# Patient Record
Sex: FEMALE | Race: WHITE | Employment: FULL TIME | URBAN - METROPOLITAN AREA
[De-identification: names, ages, dates, MRNs, and addresses within clinical notes are randomized per-mention and may not be internally consistent; named-entity substitution may affect disease eponyms.]

---

## 2017-02-16 DIAGNOSIS — E03.9 ACQUIRED HYPOTHYROIDISM: ICD-10-CM

## 2017-02-16 DIAGNOSIS — E78.5 DYSLIPIDEMIA: ICD-10-CM

## 2017-02-17 RX ORDER — LEVOTHYROXINE SODIUM 100 UG/1
TABLET ORAL
Qty: 30 TAB | Refills: 0 | Status: SHIPPED | OUTPATIENT
Start: 2017-02-17 | End: 2017-03-18 | Stop reason: SDUPTHER

## 2017-02-17 RX ORDER — PRAVASTATIN SODIUM 20 MG/1
TABLET ORAL
Qty: 30 TAB | Refills: 0 | Status: SHIPPED | OUTPATIENT
Start: 2017-02-17 | End: 2017-03-18 | Stop reason: SDUPTHER

## 2017-02-21 ENCOUNTER — OFFICE VISIT (OUTPATIENT)
Dept: FAMILY MEDICINE CLINIC | Age: 62
End: 2017-02-21

## 2017-02-21 VITALS
TEMPERATURE: 99.1 F | RESPIRATION RATE: 20 BRPM | SYSTOLIC BLOOD PRESSURE: 122 MMHG | HEIGHT: 67 IN | WEIGHT: 205.2 LBS | BODY MASS INDEX: 32.21 KG/M2 | HEART RATE: 67 BPM | OXYGEN SATURATION: 95 % | DIASTOLIC BLOOD PRESSURE: 80 MMHG

## 2017-02-21 DIAGNOSIS — Z78.9 STATIN INTOLERANCE: ICD-10-CM

## 2017-02-21 DIAGNOSIS — Z96.642 HISTORY OF TOTAL HIP REPLACEMENT, LEFT: ICD-10-CM

## 2017-02-21 DIAGNOSIS — J45.40 MODERATE PERSISTENT ASTHMA WITHOUT COMPLICATION: ICD-10-CM

## 2017-02-21 DIAGNOSIS — H53.8 BLURRY VISION, BILATERAL: ICD-10-CM

## 2017-02-21 DIAGNOSIS — Z11.59 NEED FOR HEPATITIS C SCREENING TEST: ICD-10-CM

## 2017-02-21 DIAGNOSIS — E78.5 DYSLIPIDEMIA: Primary | ICD-10-CM

## 2017-02-21 DIAGNOSIS — R73.9 HYPERGLYCEMIA: ICD-10-CM

## 2017-02-21 DIAGNOSIS — E03.4 HYPOTHYROIDISM DUE TO ACQUIRED ATROPHY OF THYROID: ICD-10-CM

## 2017-02-21 DIAGNOSIS — Z12.39 BREAST CANCER SCREENING: ICD-10-CM

## 2017-02-21 DIAGNOSIS — F32.5 MAJOR DEPRESSION IN COMPLETE REMISSION (HCC): ICD-10-CM

## 2017-02-21 DIAGNOSIS — Z12.11 COLON CANCER SCREENING: ICD-10-CM

## 2017-02-21 DIAGNOSIS — E55.9 VITAMIN D DEFICIENCY: ICD-10-CM

## 2017-02-21 DIAGNOSIS — R63.4 WEIGHT LOSS: ICD-10-CM

## 2017-02-21 DIAGNOSIS — K76.0 FATTY LIVER: ICD-10-CM

## 2017-02-21 DIAGNOSIS — L30.8 OTHER ECZEMA: ICD-10-CM

## 2017-02-21 DIAGNOSIS — E66.9 OBESITY, CLASS I, BMI 30-34.9: ICD-10-CM

## 2017-02-21 DIAGNOSIS — Z20.5 EXPOSURE TO HEPATITIS C: ICD-10-CM

## 2017-02-21 PROBLEM — Z96.649 HISTORY OF TOTAL HIP REPLACEMENT: Status: ACTIVE | Noted: 2017-02-21

## 2017-02-21 RX ORDER — TIOTROPIUM BROMIDE INHALATION SPRAY 1.56 UG/1
2 SPRAY, METERED RESPIRATORY (INHALATION)
COMMUNITY
Start: 2017-02-16 | End: 2018-02-22 | Stop reason: ALTCHOICE

## 2017-02-21 NOTE — PROGRESS NOTES
Chief Complaint   Patient presents with    Medication Refill     1. Have you been to the ER, urgent care clinic since your last visit? Hospitalized since your last visit? No    2. Have you seen or consulted any other health care providers outside of the [address] [place] [address] since your last visit? Include any pap smears or colon screening. Yes, saw Pulmonary Specialist, Dr. Rosalie York, at [facility]    In the event something were to happen to you and you were unable to speak on your behalf, do you have an Advance Directive/ Living Will in place stating your wishes? NO    If yes, do we have a copy on file NO    If no, would you like information:    Patient offered information and declined. All medications need to be refilled per patient. Patient does have dry cough, stated that she was not coughing until she pulled up to office parking lot.  Patient does have low grade fever of 99.1

## 2017-02-21 NOTE — MR AVS SNAPSHOT
Visit Information Date & Time Provider Department Dept. Phone Encounter #  
 2/21/2017  2:45 PM [facility] [phone] [id number] Follow-up Instructions Return in about 6 months (around 8/21/2017) for results, weight, referral follow up . Routing History Upcoming Health Maintenance Date Due Hepatitis C Screening 4/21/2017* BREAST CANCER SCRN MAMMOGRAM 6/22/2017* ZOSTER VACCINE AGE 60> 6/30/2017* COLONOSCOPY 7/21/2017* PAP AKA CERVICAL CYTOLOGY 1/19/2019 DTaP/Tdap/Td series (2 - Td) 10/18/2020 *Topic was postponed. The date shown is not the original due date. Allergies as of 2/21/2017  Review Complete On: 2/21/2017 By: Amanda Moore Severity Noted Reaction Type Reactions Aspirin Medium 02/04/2010   Intolerance Other (comments) Triggers asthma & causes chest tightness but takes 325 mg \"once in a while\" Lipitor [Atorvastatin]  04/11/2008    Myalgia Elevated CPK Sulfa (Sulfonamide Antibiotics)  01/12/2015   Side Effect Rash Erythromycin Low 02/04/2010   Side Effect Nausea and Vomiting Penicillins Low 02/04/2010   Side Effect Nausea and Vomiting Many years ago Current Immunizations  Reviewed on 2/21/2017 Name Date Hep A Vaccine (Adult) 3/5/2014 Hepatitis B Vaccine 4/29/2011, 11/14/2010, 10/14/2010 Influenza Vaccine 10/25/2016 Influenza Vaccine [employer]) 1/15/2016 Influenza Vaccine Split 10/9/2012, 11/22/2011 Influenza Vaccine Whole 10/14/2010 Pneumococcal Vaccine (Unspecified Type) 12/1/1998 TD Vaccine 10/1/1999 TDAP Vaccine 10/18/2010 Reviewed by Tea Lynn DO on 2/21/2017 at  4:24 PM  
You Were Diagnosed With   
  
 Codes Comments Dyslipidemia    -  Primary ICD-10-CM: E78.5 ICD-9-CM: 272.4 Hypothyroidism due to acquired atrophy of thyroid     ICD-10-CM: E03.4 ICD-9-CM: 244.8, 246.8 Vitamin D deficiency     ICD-10-CM: E55.9 ICD-9-CM: 268.9 Moderate persistent asthma without complication     ORW-66-YL: J45.40 ICD-9-CM: 493.90 stable Blurry vision, bilateral     ICD-10-CM: H53.8 ICD-9-CM: 368.8 with distance and reading Weight loss     ICD-10-CM: R63.4 ICD-9-CM: 783.21 7# since 01/2016 due to efforts Major [facility]     ICD-10-CM: F32.5 ICD-9-CM: 296.26 Obesity, Class I, BMI 30-34.9     ICD-10-CM: E66.9 ICD-9-CM: 278.00 Colon cancer screening     ICD-10-CM: Z12.11 ICD-9-CM: V76.51 Need for hepatitis C screening test     ICD-10-CM: Z11.59 
ICD-9-CM: V73.89 Exposure to hepatitis C     ICD-10-CM: Z20.5 ICD-9-CM: V01.79 brother Breast cancer screening     ICD-10-CM: Z12.39 
ICD-9-CM: V76.10 Fatty liver     ICD-10-CM: K76.0 ICD-9-CM: 571.8 History of total hip replacement, left     ICD-10-CM: G72.757 ICD-9-CM: V43.64 Other eczema     ICD-10-CM: L30.8 Statin intolerance     ICD-10-CM: Z78.9 ICD-9-CM: 995.27 Hyperglycemia     ICD-10-CM: R73.9 ICD-9-CM: 790.29 Vitals BP Pulse Temp Resp Height(growth percentile) Weight(growth percentile) 122/80 (BP 1 Location: Left arm, BP Patient Position: Sitting) 67 99.1 °F (37.3 °C) (Oral) 20 5' 7\" (1.702 m) 205 lb 3.2 oz (93.1 kg) SpO2 BMI OB Status Smoking Status 95% 32.14 kg/m2 Postmenopausal Passive Smoke Exposure - Never Smoker Vitals History BMI and BSA Data Body Mass Index Body Surface Area  
 32.14 kg/m 2 2.1 m 2 Preferred Pharmacy Pharmacy Name Phone [phone] In[address] Joshi, [address] [place] [address] [phone] Your Updated Medication List  
  
   
This list is accurate as of: 2/21/17  4:41 PM.  Always use your most recent med list.  
  
  
  
  
 albuterol-ipratropium 2.5 mg-0.5 mg/3 ml Nebu Commonly known as:  DUO-NEB  
3 mL by Nebulization route every six (6) hours. fluticasone-salmeterol 500-50 mcg/dose diskus inhaler Commonly known as:  ADVAIR DISKUS Take 1 Puff by inhalation two (2) times a day. levothyroxine 100 mcg tablet Commonly known as:  SYNTHROID  
TAKE 1 TABLET BY MOUTH DAILY BEFORE BREAKFAST FOR THYROID. methylPREDNISolone 4 mg tablet Commonly known as:  Chio Bon Take  by mouth.  
  
 montelukast 10 mg tablet Commonly known as:  SINGULAIR  
TAKE 1 TABLET DAILY FOR ALLERGIES AND ASTHMA.  
  
 multivitamin tablet Commonly known as:  ONE A DAY Take 1 Tab by mouth daily. Nebulizer & Compressor machine 1 each by Does Not Apply route daily. pravastatin 20 mg tablet Commonly known as:  PRAVACHOL  
TAKE 1 TABLET BY MOUTH EVERY EVENING  
  
 PROAIR HFA 90 mcg/actuation inhaler Generic drug:  albuterol INHALE 1-2 PUFFS EVERY 4-6 HOURS AS NEEDED  
  
 sertraline 50 mg tablet Commonly known as:  ZOLOFT  
TAKE 1 TABLET BY MOUTH DAILY. SPIRIVA RESPIMAT 1.25 mcg/actuation inhaler Generic drug:  tiotropium bromide Take 2 Puffs by mouth nightly. triamcinolone 55 mcg nasal inhaler Commonly known as:  NASACORT AQ Use 2 sprays in each nostril everyday VITAMIN D3 1,000 unit Cap Generic drug:  cholecalciferol Take 1 Tab by mouth daily. We Performed the Following CBC W/O DIFF [07448 CPT(R)] HCV AB W/RFLX TO CATHY [73700 CPT(R)] HEMOGLOBIN A1C WITH EAG [91301 CPT(R)] INSULIN [id number] CPT(R)] LIPID PANEL [53709 CPT(R)] METABOLIC PANEL, COMPREHENSIVE [94412 CPT(R)] REFERRAL TO COLON AND RECTAL SURGERY [REF17 Custom] Comments:  
 Please evaluate patient for colon ca screening REFERRAL TO OPHTHALMOLOGY [REF57 Custom] Comments:  
 Please evaluate patient for blurry vision, floaters T4, FREE [id number] CPT(R)] TSH 3RD GENERATION [11323 CPT(R)] VITAMIN D, 25 HYDROXY [id number] CPT(R)] Follow-up Instructions Return in about 6 months (around 8/21/2017) for results, weight, referral follow up . To-Do List   
 02/21/2017 Imaging:  [facility] MAMMO BI SCREENING INCL CAD Referral Information Referral ID Referred By Referred To  
  
 [id number] [facility]   
   [id number] [place],, [address] Visits Status Start Date End Date 1 New Request 2/21/17 2/21/18 If your referral has a status of pending review or denied, additional information will be sent to support the outcome of this decision. Referral ID Referred By Referred To  
 [id number] Lydia Richmondry [facility] [address] [place], [address] Visits Status Start Date End Date 1 New Request 2/21/17 2/21/18 If your referral has a status of pending review or denied, additional information will be sent to support the outcome of this decision. Introducing [facility]! Dear Inderjit Peñaup: 
Thank you for requesting a [employer] account. Our records indicate that you already have an active [employer] account. You can access your account anytime at [email or web address] Did you know that you can access your hospital and ER discharge instructions at any time in [employer]? You can also review all of your test results from your hospital stay or ER visit. Additional Information If you have questions, please visit the Frequently Asked Questions section of the [employer] website at [email or web address] Remember, [employer] is NOT to be used for urgent needs. For medical emergencies, dial 911. Now available from your iPhone and Android! Please provide this summary of care documentation to your next provider. Your primary care clinician is listed as Juan José Llamas. If you have any questions after today's visit, please call [phone].

## 2017-02-21 NOTE — PROGRESS NOTES
HISTORY OF PRESENT ILLNESS  Delia Chao is a 62 y.o. female presents with Medication Refill; Blood Pressure Check; Labs; and Referral Follow Up    Agree with nurse note. I have not seen Ms. Ya Carlos since 1/15/16. Hyperglycemic pt with hypothyroidism, dyslipidemia, statin intolerance, fatty liver, and Vitamin D deficiency presents to the office with a BP of 146/94 and lowered to 122/80. Labs were last drawn on 1/15/16. LDL was 151. Vitamin D was 31.9. TSH was 1.30. Hgb A1C was 5.7. Pt weighs 205 lbs, lost 9 lbs since last ov. She reports that she had lost weight while avoiding carbs. She had increased her intake of fish and chicken. She gained weight back once her children and grand kids moved in with her around 09/2016. They moved out recently and she plans to return to her stricter diet and regular exercise. Pt is followed by pulmonologist, Dr. Marianne Reyes, whom she last saw on 10/25/16. Dx'd moderate persistent asthma, chronic sinusitis, and abnormal xray. For asthma, he wanted her to continue with Ventolin HFA, Spiriva, and Advair Diskus. For chronic sinusitis, he wanted her to continue with Azelastine qPM, Nasacort qAM, and Singulair 10 mg. Regarding her normal CXR, his impression was granulomatous disease after being expose to [facility]. Lashay's rupture due to living close by. F/U 1 year. Pt with hx of floaters. She reports some vision changes with reading. Her ophthalmologist retired and she requests a referral today. Pt is s/p L hip replacement since 02/2013 performed by Dr. Evens Ascencio. She is still doing well. Pt with major depression in complete remission; stable. Health Maintenance    Pt's most recent colonoscopy was on 4/7/08 with GI, Dr. Casandra Abad. F/U 5 years. She requests a referral.     Pt is agreeable with Hep C screening and reports her brother has Hep C but is doing well. Pt reports seeing dermatologist, Dr. Arely Ibrahim for dry skin.     Pt's last pap smear was on 1/19/16 performed by me; normal.  Pt's most recent mammogram was on 2/8/16; normal. Pt's most recent DEXA scan was on 2/8/16; osteopenia. Written by mateusz Hooper, as dictated by Dr. Verónica Marinelli DO.    ROS    Review of Systems negative except as noted above in HPI. ALLERGIES:    Allergies   Allergen Reactions    Aspirin Other (comments)     Triggers asthma & causes chest tightness but takes 325 mg \"once in a while\"    Lipitor [Atorvastatin] Myalgia     Elevated CPK    Sulfa (Sulfonamide Antibiotics) Rash    Erythromycin Nausea and Vomiting    Penicillins Nausea and Vomiting     Many years ago       CURRENT MEDICATIONS:    Outpatient Prescriptions Marked as Taking for the 2/21/17 encounter (Office Visit) with Sandra Loza DO   Medication Sig Dispense Refill    SPIRIVA RESPIMAT 1.25 mcg/actuation inhaler Take 2 Puffs by mouth nightly.  pravastatin (PRAVACHOL) 20 mg tablet TAKE 1 TABLET BY MOUTH EVERY EVENING 30 Tab 0    levothyroxine (SYNTHROID) 100 mcg tablet TAKE 1 TABLET BY MOUTH DAILY BEFORE BREAKFAST FOR THYROID. 30 Tab 0    sertraline (ZOLOFT) 50 mg tablet TAKE 1 TABLET BY MOUTH DAILY. 90 Tab 1    triamcinolone (NASACORT AQ) 55 mcg nasal inhaler Use 2 sprays in each nostril everyday 1 Bottle 5    montelukast (SINGULAIR) 10 mg tablet TAKE 1 TABLET DAILY FOR ALLERGIES AND ASTHMA. 30 Tab 11    multivitamin (ONE A DAY) tablet Take 1 Tab by mouth daily.  fluticasone-salmeterol (ADVAIR DISKUS) 500-50 mcg/dose diskus inhaler Take 1 Puff by inhalation two (2) times a day. 1 Inhaler 5    albuterol-ipratropium (DUO-NEB) 2.5 mg-0.5 mg/3 ml nebulizer solution 3 mL by Nebulization route every six (6) hours. 30 Vial 1    Nebulizer & Compressor machine 1 each by Does Not Apply route daily.  1 each 0    PROAIR HFA 90 mcg/actuation inhaler INHALE 1-2 PUFFS EVERY 4-6 HOURS AS NEEDED 1 Inhaler 5    Cholecalciferol, Vitamin D3, (VITAMIN D) 1,000 unit Cap Take 1 Tab by mouth daily. PAST MEDICAL HISTORY:    Past Medical History   Diagnosis Date    Adjustment disorder 08/2012     Dr. Chasity Mcleod. Marycarmen Israel, counselor    Allergic rhinitis 2016     Dr. [facility] state, unspecified 01/25/07    Asthma      Dr. Pamela Cobb, pulm    Chickenpox childhood    Cutaneous wart 05/2015     Right thumb. [facility]. Dr. Geronimo Kim.  Depression     Digital mucous cyst 05/2015     Left IP joint. Dr. Magdalena Middleton.  Diverticulosis      Dr. Bo Mendez    DJD (degenerative joint disease) of hip 09/19/12     Dr. Tyrell Vaughn    Eczema 2016     Dr. Sulma Harrison liver 2006    Hearing aid worn 09/2014     Bilateral ears.  Hematuria 95/0230    Hepatitis B immune March 2014     Prior vaccine--[id number].  High cholesterol 1976    Hypothyroid 10/16/2006    Knee pain      having surgery on 2/12/13 left    Left hip pain 2013     Dr. Royce Chavez Measles childhood    Menopause 11/2006    Mumps childhood    Ovarian cyst 02/21/2008     Left and Rt . Mrs. Berman Bronjuliano    Pneumonia 1981     x 2    PONV (postoperative nausea and vomiting)     Scarlet fever 1971    Uterine fibroid 02/21/08     Jonn Byrne, Midwife       PAST SURGICAL HISTORY:    Past Surgical History   Procedure Laterality Date    Hx tonsillectomy  1962    Hx hip replacement Left 02/12/13     Left.  with Anterior approach. Dr. Kendy Boland.  Hx orthopaedic Left 05/18/15     Thumb. IP joint MUCOUS CYST REMOVED. Dr. Magdalena Middleton.  Hx orthopaedic Right 05/18/15     Thumb. Dr. Geronimo Kim.  Hx colonoscopy  04/07/08     Dr. Bo Mendez. due q 5 yrs.        FAMILY HISTORY:    Family History   Problem Relation Age of Onset    Cancer Mother      Squamous Cell Anal CA    High Cholesterol Mother     Osteoporosis Mother     Colon Cancer Father     Hypertension Father     Cancer Maternal Grandmother      cervical    Stroke Maternal Grandmother      TIAs    Other Maternal Grandmother      multiple blood clots    Osteoporosis Maternal Grandmother     Arthritis-osteo Maternal Grandmother     Dementia Maternal Grandmother      Alzheimers    Diabetes Paternal Grandfather        SOCIAL HISTORY:    Social History     Social History    Marital status: SINGLE     Spouse name: N/A    Number of children: N/A    Years of education: N/A     Social History Main Topics    Smoking status: Passive Smoke Exposure - Never Smoker     Years: 1.00    Smokeless tobacco: Never Used      Comment: grew up with smoker parents x 20 yrs    Alcohol use Yes      Comment: one beer every few months    Drug use: No    Sexual activity: No     Other Topics Concern    None     Social History Narrative       IMMUNIZATIONS:    Immunization History   Administered Date(s) Administered    Hep A Vaccine (Adult) 03/05/2014    Hepatitis B Vaccine 10/14/2010, 11/14/2010, 04/29/2011    Influenza Vaccine 10/25/2016    Influenza Vaccine (Quad) 01/15/2016    Influenza Vaccine Split 11/22/2011, 10/09/2012    Influenza Vaccine Whole 10/14/2010    Pneumococcal Vaccine (Unspecified Type) 12/01/1998    TD Vaccine 10/01/1999    TDAP Vaccine 10/18/2010         PHYSICAL EXAMINATION    Vital Signs    Visit Vitals    /80 (BP 1 Location: Left arm, BP Patient Position: Sitting)    Pulse 67    Temp 99.1 °F (37.3 °C) (Oral)    Resp 20    Ht 5' 7\" (1.702 m)    Wt 205 lb 3.2 oz (93.1 kg)    SpO2 95%    BMI 32.14 kg/m2       Weight Metrics 2/21/2017 1/26/2016 1/15/2016 5/18/2015 3/5/2015 2/10/2015 1/27/2015   Weight 205 lb 3.2 oz 212 lb 8.4 oz 214 lb 6.4 oz 211 lb 6 oz 214 lb 12.8 oz 211 lb 211 lb   BMI 32.14 kg/m2 33.28 kg/m2 35.68 kg/m2 35.17 kg/m2 36.85 kg/m2 36.2 kg/m2 36.2 kg/m2       General appearance - Well nourished. Well appearing. Well developed. No acute distress. Overweight. Head - Normocephalic. Atraumatic. Eyes - pupils equal and reactive. Extraocular eye movements intact. Sclera anicteric. Mildly injected sclera. Ears - Hearing is grossly normal bilaterally. Nose - normal and patent. No polyps noted. No erythema. No discharge. Mouth - mucous membranes with adequate moisture. Posterior pharynx normal with cobblestone appearance. No erythema, white exudate or obstruction. Neck - supple. Midline trachea. No carotid bruits noted bilaterally. No thyromegaly noted. Chest - clear to auscultation bilaterally anteriorly and posteriorly. No wheezes. No rales or rhonchi. Breath sounds are symmetrical bilaterally. Unlabored respirations. Heart - normal rate. Regular rhythm. Normal S1, S2. No murmur noted. No rubs, clicks or gallops noted. Abdomen - soft and distended. No masses or organomegaly. No rebound, rigidity or guarding. Bowel sounds normal x 4 quadrants. No tenderness noted. Neurological - awake, alert and oriented to person, place, and time and event. Cranial nerves II through XII intact. Clear speech. Muscle strength is +5/5 x 4 extremities. Sensation is intact to light touch bilaterally. Steady gait. Heme/Lymph - peripheral pulses normal x 4 extremities. No peripheral edema is noted. Musculoskeletal - Intact x 4 extremities. Full ROM x 4 extremities. No pain with movement. Back exam - normal range of motion. No pain on palpation of the spinous processes in the cervical, thoracic, lumbar, sacral regions. No CVA tenderness. Skin - no rashes, erythema, ecchymosis, lacerations, abrasions, suspicious moles noted  Psychological -   normal behavior, dress and thought processes. Good insight. Good eye contact. Normal affect. Appropriate mood. Normal speech.       DATA REVIEWED    Lab Results   Component Value Date/Time    WBC 13.6 01/26/2016 06:16 PM    Hemoglobin (POC) 11.3 02/12/2013 03:40 PM    HGB 15.1 01/26/2016 06:16 PM    HCT 44.4 01/26/2016 06:16 PM    PLATELET 183 04/98/5688 06:16 PM    MCV 83.8 01/26/2016 06:16 PM     Lab Results   Component Value Date/Time    Sodium 140 01/26/2016 06:16 PM    Potassium 4.3 01/26/2016 06:16 PM    Chloride 109 01/26/2016 06:16 PM    CO2 21 01/26/2016 06:16 PM    Anion gap 10 01/26/2016 06:16 PM    Glucose 137 01/26/2016 06:16 PM    BUN 14 01/26/2016 06:16 PM    Creatinine 0.91 01/26/2016 06:16 PM    BUN/Creatinine ratio 15 01/26/2016 06:16 PM    GFR est AA >60 01/26/2016 06:16 PM    GFR est non-AA >60 01/26/2016 06:16 PM    Calcium 9.0 01/26/2016 06:16 PM    Bilirubin, total 0.3 01/26/2016 06:16 PM    AST (SGOT) 30 01/26/2016 06:16 PM    Alk. phosphatase 78 01/26/2016 06:16 PM    Protein, total 8.1 01/26/2016 06:16 PM    Albumin 4.3 01/26/2016 06:16 PM    Globulin 3.8 01/26/2016 06:16 PM    A-G Ratio 1.1 01/26/2016 06:16 PM    ALT (SGPT) 40 01/26/2016 06:16 PM     Lab Results   Component Value Date/Time    Cholesterol, total 235 01/15/2016 10:49 AM    Cholesterol, Total 287 08/16/2013 11:42 AM    HDL Cholesterol 48 01/15/2016 10:49 AM    LDL, calculated 151 01/15/2016 10:49 AM    VLDL, calculated 36 01/15/2016 10:49 AM    Triglyceride 178 01/15/2016 10:49 AM    CHOL/HDL Ratio 7.3 02/04/2010 11:40 AM     Lab Results   Component Value Date/Time    Vitamin D 25-Hydroxy 43.8 06/21/2011 08:00 AM    VITAMIN D, 25-HYDROXY 31.9 01/15/2016 10:49 AM       Lab Results   Component Value Date/Time    Hemoglobin A1c 5.7 01/15/2016 10:49 AM     Lab Results   Component Value Date/Time    TSH 1.310 01/15/2016 10:49 AM       ASSESSMENT and PLAN      ICD-10-CM ICD-9-CM    1. Dyslipidemia E78.5 272.4 LIPID PANEL      METABOLIC PANEL, COMPREHENSIVE      INSULIN   2. Hypothyroidism due to acquired atrophy of thyroid E03.4 244.8 REFERRAL TO OPHTHALMOLOGY     246.8 TSH 3RD GENERATION      T4, FREE   3. Vitamin D deficiency E55.9 268.9 VITAMIN D, 25 HYDROXY   4. Moderate persistent asthma without complication J59.46 519.43 SPIRIVA RESPIMAT 1.25 mcg/actuation inhaler    stable   5.  Blurry vision, bilateral H53.8 368.8 REFERRAL TO OPHTHALMOLOGY      HEMOGLOBIN A1C WITH EAG    with distance and reading   6. Weight loss R63.4 783.21 CBC W/O DIFF    7# since 01/2016 due to efforts   7. Major depression in complete remission (HCC) F32.5 296.26    8. Obesity, Class I, BMI 30-34.9 E66.9 278.00    9. Colon cancer screening Z12.11 V76.51 REFERRAL TO COLON AND RECTAL SURGERY   10. Need for hepatitis C screening test Z11.59 V73.89 HCV AB W/RFLX TO CATHY   11. Exposure to hepatitis C Z20.5 V01.79 HCV AB W/RFLX TO CATHY    brother   15. Breast cancer screening Z12.39 V76.10 [facility] MAMMO BI SCREENING INCL CAD   15. Fatty liver C01.9 724.2 METABOLIC PANEL, COMPREHENSIVE      INSULIN   14. History of total hip replacement, left Z96.642 V43.64    15. Other eczema L30.8     16. Statin intolerance Z78.9 995.27    17. Hyperglycemia R73.9 790.29 HEMOGLOBIN A1C WITH EAG       Discussed the patient's BMI with her. The BMI follow up plan is as follows: I have counseled this patient on diet and exercise regimens. Addressed weight, diet and exercise with patient. Decrease carbohydrates (white foods, sweet foods, sweet drinks and alcohol), increase green leafy vegetables and protein (lean meats and beans) with each meal.  Avoid fried foods. Eat 3-5 small meals daily. Do not skip meals. Increase water intake. Increase physical activity to 30 minutes daily for health benefit or 60 minutes daily to prevent weight regain, as tolerated. Get 7-8 hours uninterrupted sleep nightly. Chart reviewed and updated. Mammogram ordered. Continue current medications and care. Most recent tests reviewed from 01/2016. Recheck pertinent labs when fasting. Recent office visit notes from Dr. Evita Romero reviewed. Get recent office visit notes from Dr. Ronny Barajas. Advised pt to sign release. Referrals given; patient urged to keep appointments with specialists. Gastroenterology. Ophthalmology.    Counseled patient on health concerns:  Hyperglycemia, cholesterol, asthma, and blurry vision. Immunizations noted. Offered empathy, support, legitimation, prayers, partnership to patient. Praised patient for progress. Advance Care Booklet given at office visit. Discussed with pt today. Referred pt to honoring choices. Staff message sent to [facility] choices team.   Follow-up Disposition:  Return in about 6 months (around 8/21/2017) for results, weight, referral follow up . Patient was offered a choice/choices in the treatment plan today. Patient expresses understanding of the plan and agrees with recommendations. Patient declines any additional handouts. Patient is satisfied with previous handouts received from our office    Written by mateusz Gilbert, as dictated by Dr. Joe Pike DO. Documentation True and Accepted by Kathleene Bence.  Terence Eason.

## 2017-02-21 NOTE — ACP (ADVANCE CARE PLANNING)
Re-discussed ACP with patient. Gave her another Right to [facility]. She prefers to discuss it with her . Accepts referral to [facility] team at this time. Patient will bring document to her next office visit.

## 2017-03-17 ENCOUNTER — HOSPITAL ENCOUNTER (OUTPATIENT)
Dept: MAMMOGRAPHY | Age: 62
Discharge: HOME | End: 2017-03-17
Attending: FAMILY MEDICINE
Payer: COMMERCIAL

## 2017-03-17 DIAGNOSIS — Z12.39 BREAST CANCER SCREENING: ICD-10-CM

## 2017-03-17 PROCEDURE — 77067 SCR MAMMO BI INCL CAD: CPT

## 2017-03-25 LAB
25(OH)D3+25(OH)D2 SERPL-MCNC: 45.7 NG/ML (ref 30–100)
ALBUMIN SERPL-MCNC: 4.5 G/DL (ref 3.6–4.8)
ALBUMIN/GLOB SERPL: 2 {RATIO} (ref 1.2–2.2)
ALP SERPL-CCNC: 75 IU/L (ref 39–117)
ALT SERPL-CCNC: 19 IU/L (ref 0–32)
AST SERPL-CCNC: 19 IU/L (ref 0–40)
BILIRUB SERPL-MCNC: 0.5 MG/DL (ref 0–1.2)
BUN SERPL-MCNC: 12 MG/DL (ref 8–27)
BUN/CREAT SERPL: 13 (ref 11–26)
CALCIUM SERPL-MCNC: 9.6 MG/DL (ref 8.7–10.3)
CHLORIDE SERPL-SCNC: 103 MMOL/L (ref 96–106)
CHOLEST SERPL-MCNC: 234 MG/DL (ref 100–199)
CO2 SERPL-SCNC: 23 MMOL/L (ref 18–29)
CREAT SERPL-MCNC: 0.89 MG/DL (ref 0.57–1)
ERYTHROCYTE [DISTWIDTH] IN BLOOD BY AUTOMATED COUNT: 14 % (ref 12.3–15.4)
EST. AVERAGE GLUCOSE BLD GHB EST-MCNC: 120 MG/DL
GLOBULIN SER CALC-MCNC: 2.3 G/DL (ref 1.5–4.5)
GLUCOSE SERPL-MCNC: 89 MG/DL (ref 65–99)
HBA1C MFR BLD: 5.8 % (ref 4.8–5.6)
HCT VFR BLD AUTO: 42.3 % (ref 34–46.6)
HCV AB S/CO SERPL IA: 0.1 S/CO RATIO (ref 0–0.9)
HCV AB SERPL QL IA: NORMAL
HDLC SERPL-MCNC: 58 MG/DL
HGB BLD-MCNC: 14.1 G/DL (ref 11.1–15.9)
INSULIN SERPL-ACNC: 16.9 UIU/ML (ref 2.6–24.9)
INTERPRETATION, 910389: NORMAL
LDLC SERPL CALC-MCNC: 136 MG/DL (ref 0–99)
MCH RBC QN AUTO: 28.8 PG (ref 26.6–33)
MCHC RBC AUTO-ENTMCNC: 33.3 G/DL (ref 31.5–35.7)
MCV RBC AUTO: 86 FL (ref 79–97)
PLATELET # BLD AUTO: 240 X10E3/UL (ref 150–379)
POTASSIUM SERPL-SCNC: 4.4 MMOL/L (ref 3.5–5.2)
PROT SERPL-MCNC: 6.8 G/DL (ref 6–8.5)
RBC # BLD AUTO: 4.9 X10E6/UL (ref 3.77–5.28)
SODIUM SERPL-SCNC: 146 MMOL/L (ref 134–144)
T4 FREE SERPL-MCNC: 1.02 NG/DL (ref 0.82–1.77)
TRIGL SERPL-MCNC: 202 MG/DL (ref 0–149)
TSH SERPL DL<=0.005 MIU/L-ACNC: 6.88 UIU/ML (ref 0.45–4.5)
VLDLC SERPL CALC-MCNC: 40 MG/DL (ref 5–40)
WBC # BLD AUTO: 5.7 X10E3/UL (ref 3.4–10.8)

## 2017-07-07 ENCOUNTER — HOSPITAL ENCOUNTER (OUTPATIENT)
Dept: GENERAL RADIOLOGY | Age: 62
Discharge: HOME | End: 2017-07-07
Payer: COMMERCIAL

## 2017-07-07 DIAGNOSIS — R93.89 ABNORMAL CHEST X-RAY: ICD-10-CM

## 2017-07-07 PROCEDURE — 71020 XR CHEST PA LAT: CPT

## 2017-08-22 ENCOUNTER — OFFICE VISIT (OUTPATIENT)
Dept: FAMILY MEDICINE CLINIC | Age: 62
End: 2017-08-22

## 2017-08-22 VITALS
RESPIRATION RATE: 16 BRPM | BODY MASS INDEX: 32.31 KG/M2 | DIASTOLIC BLOOD PRESSURE: 85 MMHG | HEIGHT: 67 IN | WEIGHT: 205.9 LBS | TEMPERATURE: 98.7 F | HEART RATE: 65 BPM | SYSTOLIC BLOOD PRESSURE: 147 MMHG | OXYGEN SATURATION: 97 %

## 2017-08-22 DIAGNOSIS — E78.5 DYSLIPIDEMIA: Primary | ICD-10-CM

## 2017-08-22 DIAGNOSIS — R73.9 HYPERGLYCEMIA: ICD-10-CM

## 2017-08-22 DIAGNOSIS — E66.9 OBESITY, CLASS I, BMI 30-34.9: ICD-10-CM

## 2017-08-22 DIAGNOSIS — Z80.0 FAMILY HISTORY OF COLON CANCER: ICD-10-CM

## 2017-08-22 DIAGNOSIS — E03.4 HYPOTHYROIDISM DUE TO ACQUIRED ATROPHY OF THYROID: ICD-10-CM

## 2017-08-22 DIAGNOSIS — Z84.89 FAMILY HISTORY OF CARCINOMA IN SITU OF ANAL CANAL: ICD-10-CM

## 2017-08-22 DIAGNOSIS — R63.1 POLYDIPSIA: ICD-10-CM

## 2017-08-22 DIAGNOSIS — J45.20 MILD INTERMITTENT ASTHMA WITHOUT COMPLICATION: ICD-10-CM

## 2017-08-22 DIAGNOSIS — Z98.890 STATUS POST BIOPSY OF SKIN: ICD-10-CM

## 2017-08-22 DIAGNOSIS — E55.9 VITAMIN D DEFICIENCY: ICD-10-CM

## 2017-08-22 DIAGNOSIS — R03.0 ELEVATED BP WITHOUT DIAGNOSIS OF HYPERTENSION: ICD-10-CM

## 2017-08-22 DIAGNOSIS — J30.1 SEASONAL ALLERGIC RHINITIS DUE TO POLLEN: ICD-10-CM

## 2017-08-22 RX ORDER — AZELASTINE HCL 205.5 UG/1
SPRAY NASAL
COMMUNITY
Start: 2017-05-22 | End: 2019-07-02 | Stop reason: ALTCHOICE

## 2017-08-22 RX ORDER — METHYLPREDNISOLONE 4 MG/1
TABLET ORAL
Qty: 1 DOSE PACK | Status: CANCELLED | OUTPATIENT
Start: 2017-08-22

## 2017-08-22 RX ORDER — CHOLECALCIFEROL TAB 125 MCG (5000 UNIT) 125 MCG
TAB ORAL DAILY
COMMUNITY

## 2017-08-22 NOTE — PATIENT INSTRUCTIONS
Learning About Low-Carbohydrate Diets for Weight Loss  What is a low-carbohydrate diet? Low-carb diets avoid foods that are high in carbohydrate. These high-carb foods include pasta, bread, rice, cereal, fruits, and starchy vegetables. Instead, these diets usually have you eat foods that are high in fat and protein. Many people lose weight quickly on a low-carb diet. But the early weight loss is water. People on this diet often gain the weight back after they start eating carbs again. Not all diet plans are safe or work well. A lot of the evidence shows that low-carb diets aren't healthy. That's because these diets often don't include healthy foods like fruits and vegetables. Losing weight safely means balancing protein, fat, and carbs with every meal and snack. And low-carb diets don't always provide the vitamins, minerals, and fiber you need. If you have a serious medical condition, talk to your doctor before you try any diet. These conditions include kidney disease, heart disease, type 2 diabetes, high cholesterol, and high blood pressure. If you are pregnant, it may not be safe for your baby if you are on a low-carb diet. How can you lose weight safely? You might have heard that a diet plan helped another person lose weight. But that doesn't mean that it will work for you. It is very hard to stay on a diet that includes lots of big changes in your eating habits. If you want to get to a healthy weight and stay there, making healthy lifestyle changes will often work better than dieting. These steps can help. · Make a plan for change. Work with your doctor to create a plan that is right for you. · See a dietitian. He or she can show you how to make healthy changes in your eating habits. · Manage stress. If you have a lot of stress in your life, it can be hard to focus on making healthy changes to your daily habits. · Track your food and activity.  You are likely to do better at losing weight if you keep track of what you eat and what you do. Follow-up care is a key part of your treatment and safety. Be sure to make and go to all appointments, and call your doctor if you are having problems. It's also a good idea to know your test results and keep a list of the medicines you take. Where can you learn more? Go to http://bernardo-manju.info/. Enter A121 in the search box to learn more about \"Learning About Low-Carbohydrate Diets for Weight Loss. \"  Current as of: December 8, 2016  Content Version: 11.3  © 5301-0857 [employer]. Care instructions adapted under license by [employer] (which disclaims liability or warranty for this information). If you have questions about a medical condition or this instruction, always ask your healthcare professional. [address] any warranty or liability for your use of this information. Starting a Weight Loss Plan: Care Instructions  Your Care Instructions  If you are thinking about losing weight, it can be hard to know where to start. Your doctor can help you set up a weight loss plan that best meets your needs. You may want to take a class on nutrition or exercise, or join a weight loss support group. If you have questions about how to make changes to your eating or exercise habits, ask your doctor about seeing a registered dietitian or an exercise specialist.  It can be a big challenge to lose weight. But you do not have to make huge changes at once. Make small changes, and stick with them. When those changes become habit, add a few more changes. If you do not think you are ready to make changes right now, try to pick a date in the future. Make an appointment to see your doctor to discuss whether the time is right for you to start a plan. Follow-up care is a key part of your treatment and safety. Be sure to make and go to all appointments, and call your doctor if you are having problems.  Its also a good idea to know your test results and keep a list of the medicines you take. How can you care for yourself at home? · Set realistic goals. Many people expect to lose much more weight than is likely. A weight loss of 5% to 10% of your body weight may be enough to improve your health. · Get family and friends involved to provide support. Talk to them about why you are trying to lose weight, and ask them to help. They can help by participating in exercise and having meals with you, even if they may be eating something different. · Find what works best for you. If you do not have time or do not like to cook, a program that offers meal replacement bars or shakes may be better for you. Or if you like to prepare meals, finding a plan that includes daily menus and recipes may be best.  · Ask your doctor about other health professionals who can help you achieve your weight loss goals. ¨ A dietitian can help you make healthy changes in your diet. ¨ An exercise specialist or  can help you develop a safe and effective exercise program.  ¨ A counselor or psychiatrist can help you cope with issues such as depression, anxiety, or family problems that can make it hard to focus on weight loss. · Consider joining a support group for people who are trying to lose weight. Your doctor can suggest groups in your area. Where can you learn more? Go to http://bernardo-manju.info/. Enter V854 in the search box to learn more about \"Starting a Weight Loss Plan: Care Instructions. \"  Current as of: October 13, 2016  Content Version: 11.3  © 2948-4381 [employer]. Care instructions adapted under license by [employer] (which disclaims liability or warranty for this information).  If you have questions about a medical condition or this instruction, always ask your healthcare professional. [place] [address] any warranty or liability for your use of this information. Starting a Weight Loss Plan: Care Instructions  Your Care Instructions  If you are thinking about losing weight, it can be hard to know where to start. Your doctor can help you set up a weight loss plan that best meets your needs. You may want to take a class on nutrition or exercise, or join a weight loss support group. If you have questions about how to make changes to your eating or exercise habits, ask your doctor about seeing a registered dietitian or an exercise specialist.  It can be a big challenge to lose weight. But you do not have to make huge changes at once. Make small changes, and stick with them. When those changes become habit, add a few more changes. If you do not think you are ready to make changes right now, try to pick a date in the future. Make an appointment to see your doctor to discuss whether the time is right for you to start a plan. Follow-up care is a key part of your treatment and safety. Be sure to make and go to all appointments, and call your doctor if you are having problems. Its also a good idea to know your test results and keep a list of the medicines you take. How can you care for yourself at home? · Set realistic goals. Many people expect to lose much more weight than is likely. A weight loss of 5% to 10% of your body weight may be enough to improve your health. · Get family and friends involved to provide support. Talk to them about why you are trying to lose weight, and ask them to help. They can help by participating in exercise and having meals with you, even if they may be eating something different. · Find what works best for you. If you do not have time or do not like to cook, a program that offers meal replacement bars or shakes may be better for you.  Or if you like to prepare meals, finding a plan that includes daily menus and recipes may be best.  · Ask your doctor about other health professionals who can help you achieve your weight loss goals.  ¨ A dietitian can help you make healthy changes in your diet. ¨ An exercise specialist or  can help you develop a safe and effective exercise program.  ¨ A counselor or psychiatrist can help you cope with issues such as depression, anxiety, or family problems that can make it hard to focus on weight loss. · Consider joining a support group for people who are trying to lose weight. Your doctor can suggest groups in your area. Where can you learn more? Go to http://bernardo-manju.info/. Enter Y466 in the search box to learn more about \"Starting a Weight Loss Plan: Care Instructions. \"  Current as of: October 13, 2016  Content Version: 11.3  © 5934-1225 [employer]. Care instructions adapted under license by [employer] (which disclaims liability or warranty for this information). If you have questions about a medical condition or this instruction, always ask your healthcare professional. [place] [address] any warranty or liability for your use of this information. WEIGHT LOSS PLAN    1. Read food labels and count calories. Goal 3259-0271 calories daily for women and 1556-3292 calories daily for men. Achieve this by decreasing caloric intake by 500 calories by weekly increments. NOTE:  1 pound = 3500 calories! Www.loseit. com  Www.mySkyroboticnesspal.com  Www.[employer]ova  Www.healthfinder. gov  Weight watchers mobile    Calories needed to lose 1-2 pounds a week = 10 x your weight in pounds    2. Increase water intake. Per [facility], it is important to drink 1/2 your body weight in ounces of water daily. Decrease your water consumption 2-3 hours before bedtime to prevent sleep disturbance from frequent urination. 3.  Decrease sugary beverages. Each can or glass of soda increases your risk of obesity by 60%. Can lose 10 pounds in a month by avoiding any soda.      12 oz can of soda = 140 calories, 16 oz cup of sweet tea = 200 calories    16 oz orange juice = 200 calories, 10 oz apple juice = 150 calories   32 oz sports drink = 200 calories, 16 oz punch = 240 calories   3.5 oz alcohol = 100-150 calories     4. Avoid High Fructose Corn Syrup products. This ingredient makes products highly addictive! 5. Exercise 30 minutes daily 5 days weekly, minimally. If you burn 3500 calories that equals a pound! Use a pedometer to count steps. Visit www. [employer]any for a free pedometer and diet recommendations. Your maximum heart rate = 220 - your age    Never exercise at your maximum heart rate. See handout for target heart rate. 6.  Decrease carbohydrates (white bread, pasta, rice, potatoes, sweet foods and sweet drinks like soda, tea, coffee, juice and sports drinks). Increase fiber and protein. Goal:   calories daily of carbohydrates     Try CHROMIUM PICONATE 200 MG THREE TIMES DAILY,    to decrease Premenstrual carbohydrate cravings. 7.  Eat 3-5 small meals daily, include lots of protein (beans/legumes, nuts, lean meat, eggs) and green vegetables with each. (Breakfast, lunch, dinner with 2 healthy snacks)    8. Get proper rest 7-8 hours uninterrupted. When you get less than 6 hours, it triggers hunger by affecting your [place]:Leptin ratio and this results in weight gain. 9.  Watch your food portions. Green leafy vegetables should cover 1/2 of the plate, lean meat 1/4 of the plate, starchy vegetable 1/4 of the plate. Use smaller plates. 10.  Do not eat until you are full. Eat until you are no longer hungry. If you are not sure, try drinking a glass of water before getting your second serving of food. 11.  Do not weigh yourself daily. Wait until your next office visit. Use how you feel and  how your clothes fit as measurements of success. 12.  Address your spirituality to draw strength from above during your journey. Remember \"I am fearfully and wonderfully made. Neal in His eyes. \"    13. Set realistic, appropriate and achievable weight loss goals:     RECOMMENDED TARGET WEIGHT LOSS:  Initial weight loss of 5-10% of your initial body weight achieved over 6 months or a decrease of 2 BMI units. MINIMUM GOAL OF WEIGHT LOSS:  Reduce body weight and maintain a lower body weight. Prevent weight gain. RELATED WEBSITES:      Www.obesityaction. org  (and consider joining the Obesity Action Coalition for $25/year. The [address] mission is to elevated and empower those affected by obesity through education, advocacy and support. Quarterly journals included in membership fee. )    Www.Kiddies Smilz  www.CrowdFlower.com     RELATED DIETS:  Dr. Wei Still Weight loss challenge, Mediterranean diet, [address] [place] [address] Watchers, Paleo Diet (anti-inflammatory diet)        EXERCISE 150 MINUTES WEEKLY. THIS CAN BE ACHIEVED BY WORKING OUT OR WALKING A MINIMUM OF 30 MINUTES FOR 5 DAYS WEEKLY. YOU CAN EXERCISE IN INCREMENTS OF 10-15 MINUTES UP TO 3 TIMES A DAY. Consider performing \"brainless exercise. \"  Choose your favorite tv program.  Five minutes before and for 5 minutes after the tv program, stretch your body. While the program is on, walk in place watching the show. When commercials come on, rest or walk around the house to do other things. When the program begins, return to walking in place. When you are able keep walking during the commercials and add light weights to your ankles or hands. By the end of the show, you would have walked 30 minutes. If you need shorter spurts of exercise, walk during the commercials and rest during the show. Drink to glasses of water prior to any exercise to prevent dehydration and to improve the results of the work out. Your RESTING HEART RATE is the number of times your heart beats per minute when you are not exerting yourself. The more fit you are, the lower your resting heart rate will be.     Your MAXIMUM HEART RATE is the number of times per minute your heart pumps when it is working at 100% capacity. NEVER EXERCISE AT YOUR MAXIMUM HEART RATE. MAX HEART RATE = 220 - your age    For example, in a 48year old, the maximum heart rate is 220-50 = 170 beats per minute    Your [place] is the number of beats per minute our heart should pump during aerobic exercise. Reaching your target heart rate indicates that your body is receiving maximum cardiovascular and fat burning benefits. If you are fit, your TARGET HEART RATE = 70-85% of your maximum Heart rate      For a 48year old with vigorous intensity physical activity,         Target heart rate= 170 bpm x .70 = 119 bpm     Target heart rate = 170 bpm x .85=  145 bpm        Therefore, your target heart rate during physical activity is 119-145      If you are not fit, TARGET HEART RATE = 50-70% of your maximum Heart rate    MODERATE CONSISTENT AEROBIC EXERCISE OR WALKING FOR AT LEAST 150 MINUTES WEEKLY IS AN ESSENTIAL PART OF ANY WEIGHT LOSS OF WEIGHT MAINTENANCE PROGRAM.     During WEIGHT LOSS PHASE, at least 75% of your exercise needs to be walking or moderate aerobic activity and 25% or 0% can be Isometric or Resistance type exercises (the latter can be deferred to the weight maintenance phase.)     During WEIGHT MAINTENANCE PHASE, at least 50% of your exercise needs to be aerobic and 50% Isometric or Resistance type exercises. BENEFITS OF MODERATE INTENSITY EXERCISE MOST DAYS OF THE WEEK:     1. Insulin Resistance improves 30-85%   2. Abdominal Fat decreases by 30%   3. Inflammatory Markers decrease by 30% (therefore pain decreases)   4. Systolic and Diastolic Blood Pressure decrease by 4 mmHg   5. HDL improves by 5%   6. Triglycerides decrease by 15%   7. Shift from small dense LDL to large dense LDL (therefore decrease Insulin Resistance)   8.   Decrease coagulability                  Starting a Weight Loss Plan: Care Instructions  Your Care Instructions  If you are thinking about losing weight, it can be hard to know where to start. Your doctor can help you set up a weight loss plan that best meets your needs. You may want to take a class on nutrition or exercise, or join a weight loss support group. If you have questions about how to make changes to your eating or exercise habits, ask your doctor about seeing a registered dietitian or an exercise specialist.  It can be a big challenge to lose weight. But you do not have to make huge changes at once. Make small changes, and stick with them. When those changes become habit, add a few more changes. If you do not think you are ready to make changes right now, try to pick a date in the future. Make an appointment to see your doctor to discuss whether the time is right for you to start a plan. Follow-up care is a key part of your treatment and safety. Be sure to make and go to all appointments, and call your doctor if you are having problems. Its also a good idea to know your test results and keep a list of the medicines you take. How can you care for yourself at home? · Set realistic goals. Many people expect to lose much more weight than is likely. A weight loss of 5% to 10% of your body weight may be enough to improve your health. · Get family and friends involved to provide support. Talk to them about why you are trying to lose weight, and ask them to help. They can help by participating in exercise and having meals with you, even if they may be eating something different. · Find what works best for you. If you do not have time or do not like to cook, a program that offers meal replacement bars or shakes may be better for you. Or if you like to prepare meals, finding a plan that includes daily menus and recipes may be best.  · Ask your doctor about other health professionals who can help you achieve your weight loss goals.   ¨ A dietitian can help you make healthy changes in your diet. ¨ An exercise specialist or  can help you develop a safe and effective exercise program.  ¨ A counselor or psychiatrist can help you cope with issues such as depression, anxiety, or family problems that can make it hard to focus on weight loss. · Consider joining a support group for people who are trying to lose weight. Your doctor can suggest groups in your area. Where can you learn more? Go to http://bernardo-manju.info/. Enter B826 in the search box to learn more about \"Starting a Weight Loss Plan: Care Instructions. \"  Current as of: October 13, 2016  Content Version: 11.3  © 2084-6430 [employer]. Care instructions adapted under license by [employer] (which disclaims liability or warranty for this information). If you have questions about a medical condition or this instruction, always ask your healthcare professional. [address] any warranty or liability for your use of this information. Check BP twice weekly. Notify me if it consistently is above 140/90 or below 90/60. Bring your blood pressure log to your next office visit. Decrease salt, fats, caffeine, alcohol in your diet. Increase water, fiber. Exercise 5 times weekly for 30 minutes daily as tolerated. Continue current medications, care and subspecialty follow up. Visit eye doctor yearly to check your eyes blood pressure related changes.

## 2017-08-22 NOTE — PROGRESS NOTES
Chief Complaint   Patient presents with    Weight Management    Referral Follow Up     GI/Opthamologist; pt has not seen GI yet, and pt stated that she is going to 's and will schedule an appointment    Results    Skin Problem     area where pt had biopsy red, puffy, sticking out, and itchy.  Request For New Medication     would like to get shingles vaccine     1. Have you been to the ER, urgent care clinic since your last visit? Hospitalized since your last visit? No    2. Have you seen or consulted any other health care providers outside of the [address] [place] [address] since your last visit? Include any pap smears or colon screening. Yes, pt has seen Dr. Berneice Ormond and Dr. Jennifer Coleman since lov    In the event something were to happen to you and you were unable to speak on your behalf, do you have an Advance Directive/ Living Will in place stating your wishes? NO    If yes, do we have a copy on file NO    If no, would you like information:   Pt offered and declined.

## 2017-08-22 NOTE — PROGRESS NOTES
HISTORY OF PRESENT ILLNESS  Hassell Lombard is a 65 y.o. female presents with Weight Management; Referral Follow Up (GI/Opthamologist; pt has not seen GI yet, and pt stated that she is going to 's and will schedule an appointment); Results; Skin Problem (area where pt had biopsy red, puffy, sticking out, and itchy. ); and Request For New Medication (would like to get shingles vaccine)    Agree with nurse note. Dyslipidemic, hyperglycemic pt with hypothyroidism and Vit D deficiency presents to the office with a BP of 147/85, which she attributes to a stressful work day. She has had episode of chest tightness and heart racing this summer. Improves with deep breathing. She requests her most recent labs from 03/24/17. Hep C screening was negative. LDL was 136. Triglycerides were 202. Sodium was 146. Vit D was 45.7. Hgb A1C was 5.8. Insulin was 16.9. TSH was 6.88. FT4 was 1.02. For hypothyroidism, she takes Synthroid 100 mcg daily and 2 tabs on Sundays, tolerating well. Weight is stable at 205 lbs. She decreased her intake of fried foods, rice, and other carbs. She rides her bike with her grand kids. Every Wednesday, she and her team try a different brewery. She sees pulmonologist, Dr. Alex Euceda for asthma. LOV was 10/25/16. Dx'd moderate persistent asthma, chronic sinusitis, and abnormal CXR. For asthma, Ventolin prn, Spiriva 2 puffs daily, and Advair Diskus 500-50 mcg 1 puff BID. For sinusitis, Azelastine 2 sprays per nostril daily and Nasacort allergy 2 sprays BID. He is monitoring granulomatous disease and planned to repeat a CXR. She follows up with him next week. She does wake up q2 hours because her mouth is dry and she sips on water throughout the night. She reports see her dermatologist earlier this year and had a skin biopsy of her back. That area is raised and itchy still. Health Maintenance    Pt with family hx of colon cancer and family hx of anal cancer.  most recent colonoscopy was on 04/07/08 with GI, Dr. Amy Clark. F/U 5 years. Pt's most recent mammogram was on 03/17/17; normal.     Written by mateusz Hylton, as dictated by Dr. Yanet Amaya DO.    ROS    Review of Systems negative except as noted above in HPI. ALLERGIES:    Allergies   Allergen Reactions    Aspirin Other (comments)     Triggers asthma & causes chest tightness but takes 325 mg \"once in a while\"    Lipitor [Atorvastatin] Myalgia     Elevated CPK    Sulfa (Sulfonamide Antibiotics) Rash    Erythromycin Nausea and Vomiting    Penicillins Nausea and Vomiting     Many years ago       CURRENT MEDICATIONS:    Outpatient Prescriptions Marked as Taking for the 8/22/17 encounter (Office Visit) with Bhupinder Morales DO   Medication Sig Dispense Refill    Azelastine (ASTEPRO) 0.15 % (205.5 mcg) nasal spray       cholecalciferol, VITAMIN D3, (VITAMIN D3) 5,000 unit tab tablet Take  by mouth daily.  fluticasone-salmeterol (ADVAIR DISKUS) 500-50 mcg/dose diskus inhaler Take 1 Puff by inhalation two (2) times a day. 1 Inhaler 0    sertraline (ZOLOFT) 50 mg tablet TAKE 1 TABLET BY MOUTH DAILY. 90 Tab 2    levothyroxine (SYNTHROID) 100 mcg tablet Take 1 Tab by mouth Daily (before breakfast). And 2 on Sundays 35 Tab 1    pravastatin (PRAVACHOL) 20 mg tablet TAKE 1 TABLET BY MOUTH EVERY EVENING 30 Tab 2    montelukast (SINGULAIR) 10 mg tablet TAKE 1 TABLET BY MOUTH DAILY FOR ALLERGIES AND ASTHMA. 30 Tab 11    SPIRIVA RESPIMAT 1.25 mcg/actuation inhaler Take 2 Puffs by mouth nightly.  triamcinolone (NASACORT AQ) 55 mcg nasal inhaler Use 2 sprays in each nostril everyday 1 Bottle 5    multivitamin (ONE A DAY) tablet Take 1 Tab by mouth daily.  albuterol-ipratropium (DUO-NEB) 2.5 mg-0.5 mg/3 ml nebulizer solution 3 mL by Nebulization route every six (6) hours. 30 Vial 1    Nebulizer & Compressor machine 1 each by Does Not Apply route daily.  1 each 0    PROAIR HFA 90 mcg/actuation inhaler INHALE 1-2 PUFFS EVERY 4-6 HOURS AS NEEDED 1 Inhaler 5       PAST MEDICAL HISTORY:    Past Medical History:   Diagnosis Date    Adjustment disorder 08/2012    Dr. Smith Other. Cristiano Rosales, counselor    Allergic rhinitis 2016    Dr. [facility], unspecified 01/25/07    Asthma     Dr. Felicia Dooley, pulm    Chickenpox childhood    Cutaneous wart 05/2015    Right thumb. [facility]. Dr. Praful Lambert.  Depression     Digital mucous cyst 05/2015    Left IP joint. Dr. Diego Flores.  Diverticulosis     Dr. Shona Candelaria    DJD (degenerative joint disease) of hip 09/19/12    Dr. Natalio Mahmood    Eczema 2016    Dr. Braulio Collazo liver 2006    Hearing aid worn 09/2014    Bilateral ears.  Hematuria 52/7197    Hepatitis B immune March 2014    Prior vaccine--[id number].  High cholesterol 1976    Hypothyroid 10/16/2006    Knee pain     having surgery on 2/12/13 left    Left hip pain 2013    Dr. Mikel Anderson Measles childhood    Menopause 11/2006    Mumps childhood    Ovarian cyst 02/21/2008    Left and Rt . Mrs. Berman Bronjuliano    Pneumonia 1981    x 2    PONV (postoperative nausea and vomiting)     Scarlet fever 1971    Uterine fibroid 02/21/08    Colleen Bowman       PAST SURGICAL HISTORY:    Past Surgical History:   Procedure Laterality Date    HX COLONOSCOPY  04/07/08    Dr. Shona Candelaria. due q 5 yrs.  HX HIP REPLACEMENT Left 02/12/13    Left.  with Anterior approach. Dr. Vin Bey.  HX ORTHOPAEDIC Left 05/18/15    Thumb. IP joint MUCOUS CYST REMOVED. Dr. Diego Flores.  HX ORTHOPAEDIC Right 05/18/15    Thumb. Dr. Praful Lambert.     HX TONSILLECTOMY  1962       FAMILY HISTORY:    Family History   Problem Relation Age of Onset    Cancer Mother      Squamous Cell Anal CA    High Cholesterol Mother     Osteoporosis Mother     Colon Cancer Father     Hypertension Father     Cancer Maternal Grandmother      cervical    Stroke Maternal Grandmother      TIAs    Other Maternal Grandmother      multiple blood clots    Osteoporosis Maternal Grandmother     Arthritis-osteo Maternal Grandmother     Dementia Maternal Grandmother      Alzheimers    Diabetes Paternal Grandfather        SOCIAL HISTORY:    Social History     Social History    Marital status: SINGLE     Spouse name: N/A    Number of children: N/A    Years of education: N/A     Social History Main Topics    Smoking status: Passive Smoke Exposure - Never Smoker     Years: 1.00    Smokeless tobacco: Never Used      Comment: grew up with smoker parents x 20 yrs    Alcohol use Yes      Comment: one beer every few months    Drug use: No    Sexual activity: No     Other Topics Concern    None     Social History Narrative       IMMUNIZATIONS:    Immunization History   Administered Date(s) Administered    Hep A Vaccine (Adult) 03/05/2014    Hepatitis B Vaccine 10/14/2010, 11/14/2010, 04/29/2011    Influenza Vaccine 10/25/2016    Influenza Vaccine (Quad) 01/15/2016    Influenza Vaccine Split 11/22/2011, 10/09/2012    Influenza Vaccine Whole 10/14/2010    TD Vaccine 10/01/1999    TDAP Vaccine 10/18/2010    ZZZ-RETIRED (DO NOT USE) Pneumococcal Vaccine (Unspecified Type) 12/01/1998         PHYSICAL EXAMINATION    Vital Signs    Visit Vitals    /85 (BP 1 Location: Left arm, BP Patient Position: Sitting)    Pulse 65    Temp 98.7 °F (37.1 °C) (Oral)    Resp 16    Ht 5' 7\" (1.702 m)    Wt 205 lb 14.4 oz (93.4 kg)    SpO2 97%    BMI 32.25 kg/m2       Weight Metrics 8/22/2017 2/21/2017 1/26/2016 1/15/2016 5/18/2015 3/5/2015 2/10/2015   Weight 205 lb 14.4 oz 205 lb 3.2 oz 212 lb 8.4 oz 214 lb 6.4 oz 211 lb 6 oz 214 lb 12.8 oz 211 lb   BMI 32.25 kg/m2 32.14 kg/m2 33.28 kg/m2 35.68 kg/m2 35.17 kg/m2 36.85 kg/m2 36.2 kg/m2       General appearance - Well nourished. Well appearing. Well developed. No acute distress. Obese. Head - Normocephalic. Atraumatic.     Eyes - pupils equal and reactive. Extraocular eye movements intact. Sclera anicteric. Mildly injected sclera. Ears - Hearing is grossly normal bilaterally. Nose - normal and patent. No polyps noted. No erythema. No discharge. Mouth - mucous membranes with adequate moisture. Posterior pharynx normal with cobblestone appearance. No erythema, white exudate or obstruction. Neck - supple. Midline trachea. No carotid bruits noted bilaterally. No thyromegaly noted. Chest - clear to auscultation bilaterally anteriorly and posteriorly. No wheezes. No rales or rhonchi. Breath sounds are symmetrical bilaterally. Unlabored respirations. Heart - normal rate. Regular rhythm. Normal S1, S2. No murmur noted. No rubs, clicks or gallops noted. Abdomen - soft and distended. No masses or organomegaly. No rebound, rigidity or guarding. Bowel sounds normal x 4 quadrants. No tenderness noted. Neurological - awake, alert and oriented to person, place, and time and event. Cranial nerves II through XII intact. Clear speech. Muscle strength is +5/5 x 4 extremities. Sensation is intact to light touch bilaterally. Steady gait. Heme/Lymph - peripheral pulses normal x 4 extremities. No peripheral edema is noted. Musculoskeletal - Intact x 4 extremities. Full ROM x 4 extremities. No pain with movement. Back exam - normal range of motion. No pain on palpation of the spinous processes in the cervical, thoracic, lumbar, sacral regions. No CVA tenderness. Skin - no rashes, erythema, ecchymosis, lacerations, abrasions, suspicious moles noted  Psychological -   normal behavior, dress and thought processes. Good insight. Good eye contact. Normal affect. Appropriate mood. Normal speech.       DATA REVIEWED    Results for orders placed or performed in visit on 02/21/17   HCV AB W/RFLX TO CATHY   Result Value Ref Range    HCV Ab 0.1 0.0 - 0.9 s/co ratio   LIPID PANEL   Result Value Ref Range    Cholesterol, total 234 (H) 100 - 199 mg/dL    Triglyceride 202 (H) 0 - 149 mg/dL    HDL Cholesterol 58 >39 mg/dL    VLDL, calculated 40 5 - 40 mg/dL    LDL, calculated 136 (H) 0 - 99 mg/dL   METABOLIC PANEL, COMPREHENSIVE   Result Value Ref Range    Glucose 89 65 - 99 mg/dL    BUN 12 8 - 27 mg/dL    Creatinine 0.89 0.57 - 1.00 mg/dL    GFR est non-AA 71 >59 mL/min/1.73    GFR est AA 81 >59 mL/min/1.73    BUN/Creatinine ratio 13 11 - 26    Sodium 146 (H) 134 - 144 mmol/L    Potassium 4.4 3.5 - 5.2 mmol/L    Chloride 103 96 - 106 mmol/L    CO2 23 18 - 29 mmol/L    Calcium 9.6 8.7 - 10.3 mg/dL    Protein, total 6.8 6.0 - 8.5 g/dL    Albumin 4.5 3.6 - 4.8 g/dL    GLOBULIN, TOTAL 2.3 1.5 - 4.5 g/dL    A-G Ratio 2.0 1.2 - 2.2    Bilirubin, total 0.5 0.0 - 1.2 mg/dL    Alk. phosphatase 75 39 - 117 IU/L    AST (SGOT) 19 0 - 40 IU/L    ALT (SGPT) 19 0 - 32 IU/L   TSH 3RD GENERATION   Result Value Ref Range    TSH 6.880 (H) 0.450 - 4.500 uIU/mL   VITAMIN D, 25 HYDROXY   Result Value Ref Range    VITAMIN D, 25-HYDROXY 45.7 30.0 - 100.0 ng/mL   HEMOGLOBIN A1C WITH EAG   Result Value Ref Range    Hemoglobin A1c 5.8 (H) 4.8 - 5.6 %    Estimated average glucose 120 mg/dL   INSULIN   Result Value Ref Range    Insulin 16.9 2.6 - 24.9 uIU/mL   CBC W/O DIFF   Result Value Ref Range    WBC 5.7 3.4 - 10.8 x10E3/uL    RBC 4.90 3.77 - 5.28 x10E6/uL    HGB 14.1 11.1 - 15.9 g/dL    HCT 42.3 34.0 - 46.6 %    MCV 86 79 - 97 fL    MCH 28.8 26.6 - 33.0 pg    MCHC 33.3 31.5 - 35.7 g/dL    RDW 14.0 12.3 - 15.4 %    PLATELET 907 158 - 828 x10E3/uL   T4, FREE   Result Value Ref Range    T4, Free 1.02 0.82 - 1.77 ng/dL   INTERPRETATION   Result Value Ref Range    HCV Interpretation Comment    CVD REPORT   Result Value Ref Range    INTERPRETATION Note        ASSESSMENT and PLAN      ICD-10-CM ICD-9-CM    1. Dyslipidemia E78.5 272.4 LIPID PANEL   2.  Hypothyroidism due to acquired atrophy of thyroid E03.4 244.8 TSH 3RD GENERATION     246.8 T4, FREE   3. Vitamin D deficiency E55.9 268.9 cholecalciferol, VITAMIN D3, (VITAMIN D3) 5,000 unit tab tablet   4. Polydipsia R63.1 783.5    5. Obesity, Class I, BMI 30-34.9 E66.9 278.00    6. Seasonal allergic rhinitis due to pollen J30.1 477.0 Azelastine (ASTEPRO) 0.15 % (205.5 mcg) nasal spray    stable off Astelin   7. Mild intermittent asthma without complication R88.48 569.44     stable   8. Family history of colon cancer Z80.0 V16.0    9. Family history of carcinoma in situ of anal canal Z84.89 V19.8    10. Status post biopsy of skin Z98.890 V45.89    11. Hyperglycemia R73.9 790.29    12. Elevated BP without diagnosis of hypertension R03.0 796.2 [facility] FL[facility]       Discussed the patient's BMI with her. The BMI follow up plan is as follows: I have counseled this patient on diet and exercise regimens. Decrease carbohydrates (white foods, sweet foods, sweet drinks and alcohol), increase green leafy vegetables and protein (lean meats and beans) with each meal.  Avoid fried foods. Eat 3-5 small meals daily. Do not skip meals. Increase water intake. Increase physical activity to 30 minutes daily for health benefit or 60 minutes daily to prevent weight regain, as tolerated. Get 7-8 hours uninterrupted sleep nightly. Chart reviewed and updated. Continue current medications and care. [facility] [place] BP log ordered. Most recent tests reviewed from 03/2017. Recheck pertinent labs when fasting. Recent office visit notes from Dr. Regla Perez reviewed. Get recent office visit notes from Dr. Lorene Gandara. Advised pt to sign release. Reprinted GI referral.   Counseled patient on health concerns:  BP, hypothyroidism, and polydipsia. Relevant handouts given and discussed with patient. Immunizations noted. Offered empathy, support, legitimation, prayers, partnership to patient. Praised patient for progress. Follow-up Disposition:  Return in about 6 months (around 2/22/2018) for bp, thyroid, results.     Patient was offered a choice/choices in the treatment plan today. Patient expresses understanding of the plan and agrees with recommendations. Written by mateusz Barrera, as dictated by Dr. Aniket Galarza DO. Documentation True and Accepted by Rocky Powell. Frank Guo. Patient Instructions        Learning About Low-Carbohydrate Diets for Weight Loss  What is a low-carbohydrate diet? Low-carb diets avoid foods that are high in carbohydrate. These high-carb foods include pasta, bread, rice, cereal, fruits, and starchy vegetables. Instead, these diets usually have you eat foods that are high in fat and protein. Many people lose weight quickly on a low-carb diet. But the early weight loss is water. People on this diet often gain the weight back after they start eating carbs again. Not all diet plans are safe or work well. A lot of the evidence shows that low-carb diets aren't healthy. That's because these diets often don't include healthy foods like fruits and vegetables. Losing weight safely means balancing protein, fat, and carbs with every meal and snack. And low-carb diets don't always provide the vitamins, minerals, and fiber you need. If you have a serious medical condition, talk to your doctor before you try any diet. These conditions include kidney disease, heart disease, type 2 diabetes, high cholesterol, and high blood pressure. If you are pregnant, it may not be safe for your baby if you are on a low-carb diet. How can you lose weight safely? You might have heard that a diet plan helped another person lose weight. But that doesn't mean that it will work for you. It is very hard to stay on a diet that includes lots of big changes in your eating habits. If you want to get to a healthy weight and stay there, making healthy lifestyle changes will often work better than dieting. These steps can help. · Make a plan for change. Work with your doctor to create a plan that is right for you.   · See a dietitian. He or she can show you how to make healthy changes in your eating habits. · Manage stress. If you have a lot of stress in your life, it can be hard to focus on making healthy changes to your daily habits. · Track your food and activity. You are likely to do better at losing weight if you keep track of what you eat and what you do. Follow-up care is a key part of your treatment and safety. Be sure to make and go to all appointments, and call your doctor if you are having problems. It's also a good idea to know your test results and keep a list of the medicines you take. Where can you learn more? Go to http://bernardo-manju.info/. Enter A121 in the search box to learn more about \"Learning About Low-Carbohydrate Diets for Weight Loss. \"  Current as of: December 8, 2016  Content Version: 11.3  © 9905-1094 [employer]. Care instructions adapted under license by [employer] (which disclaims liability or warranty for this information). If you have questions about a medical condition or this instruction, always ask your healthcare professional. [address] any warranty or liability for your use of this information. Starting a Weight Loss Plan: Care Instructions  Your Care Instructions  If you are thinking about losing weight, it can be hard to know where to start. Your doctor can help you set up a weight loss plan that best meets your needs. You may want to take a class on nutrition or exercise, or join a weight loss support group. If you have questions about how to make changes to your eating or exercise habits, ask your doctor about seeing a registered dietitian or an exercise specialist.  It can be a big challenge to lose weight. But you do not have to make huge changes at once. Make small changes, and stick with them. When those changes become habit, add a few more changes.   If you do not think you are ready to make changes right now, try to pick a date in the future. Make an appointment to see your doctor to discuss whether the time is right for you to start a plan. Follow-up care is a key part of your treatment and safety. Be sure to make and go to all appointments, and call your doctor if you are having problems. Its also a good idea to know your test results and keep a list of the medicines you take. How can you care for yourself at home? · Set realistic goals. Many people expect to lose much more weight than is likely. A weight loss of 5% to 10% of your body weight may be enough to improve your health. · Get family and friends involved to provide support. Talk to them about why you are trying to lose weight, and ask them to help. They can help by participating in exercise and having meals with you, even if they may be eating something different. · Find what works best for you. If you do not have time or do not like to cook, a program that offers meal replacement bars or shakes may be better for you. Or if you like to prepare meals, finding a plan that includes daily menus and recipes may be best.  · Ask your doctor about other health professionals who can help you achieve your weight loss goals. ¨ A dietitian can help you make healthy changes in your diet. ¨ An exercise specialist or  can help you develop a safe and effective exercise program.  ¨ A counselor or psychiatrist can help you cope with issues such as depression, anxiety, or family problems that can make it hard to focus on weight loss. · Consider joining a support group for people who are trying to lose weight. Your doctor can suggest groups in your area. Where can you learn more? Go to http://bernardo-manju.info/. Enter J954 in the search box to learn more about \"Starting a Weight Loss Plan: Care Instructions. \"  Current as of: October 13, 2016  Content Version: 11.3  © 9149-7485 [employer].  Care instructions adapted under license by [employer] (which disclaims liability or warranty for this information). If you have questions about a medical condition or this instruction, always ask your healthcare professional. [address] any warranty or liability for your use of this information. Starting a Weight Loss Plan: Care Instructions  Your Care Instructions  If you are thinking about losing weight, it can be hard to know where to start. Your doctor can help you set up a weight loss plan that best meets your needs. You may want to take a class on nutrition or exercise, or join a weight loss support group. If you have questions about how to make changes to your eating or exercise habits, ask your doctor about seeing a registered dietitian or an exercise specialist.  It can be a big challenge to lose weight. But you do not have to make huge changes at once. Make small changes, and stick with them. When those changes become habit, add a few more changes. If you do not think you are ready to make changes right now, try to pick a date in the future. Make an appointment to see your doctor to discuss whether the time is right for you to start a plan. Follow-up care is a key part of your treatment and safety. Be sure to make and go to all appointments, and call your doctor if you are having problems. Its also a good idea to know your test results and keep a list of the medicines you take. How can you care for yourself at home? · Set realistic goals. Many people expect to lose much more weight than is likely. A weight loss of 5% to 10% of your body weight may be enough to improve your health. · Get family and friends involved to provide support. Talk to them about why you are trying to lose weight, and ask them to help. They can help by participating in exercise and having meals with you, even if they may be eating something different. · Find what works best for you.  If you do not have time or do not like to cook, a program that offers meal replacement bars or shakes may be better for you. Or if you like to prepare meals, finding a plan that includes daily menus and recipes may be best.  · Ask your doctor about other health professionals who can help you achieve your weight loss goals. ¨ A dietitian can help you make healthy changes in your diet. ¨ An exercise specialist or  can help you develop a safe and effective exercise program.  ¨ A counselor or psychiatrist can help you cope with issues such as depression, anxiety, or family problems that can make it hard to focus on weight loss. · Consider joining a support group for people who are trying to lose weight. Your doctor can suggest groups in your area. Where can you learn more? Go to http://bernardoAssociated Contentmanju.info/. Enter J568 in the search box to learn more about \"Starting a Weight Loss Plan: Care Instructions. \"  Current as of: October 13, 2016  Content Version: 11.3  © 2745-9468 [employer]. Care instructions adapted under license by [employer] (which disclaims liability or warranty for this information). If you have questions about a medical condition or this instruction, always ask your healthcare professional. [place] [address] any warranty or liability for your use of this information. WEIGHT LOSS PLAN    1. Read food labels and count calories. Goal 1194-6009 calories daily for women and 6314-5894 calories daily for men. Achieve this by decreasing caloric intake by 500 calories by weekly increments. NOTE:  1 pound = 3500 calories! Www.loseit. com  Www.myfitnesspal.com  Www.[employer]. [employer]  Www.healthfinder. gov  Weight watchers mobile    Calories needed to lose 1-2 pounds a week = 10 x your weight in pounds    2. Increase water intake. Per [employer] [facility] loss Program, it is important to drink 1/2 your body weight in ounces of water daily.   Decrease your water consumption 2-3 hours before bedtime to prevent sleep disturbance from frequent urination. 3.  Decrease sugary beverages. Each can or glass of soda increases your risk of obesity by 60%. Can lose 10 pounds in a month by avoiding any soda. 12 oz can of soda = 140 calories, 16 oz cup of sweet tea = 200 calories    16 oz orange juice = 200 calories, 10 oz apple juice = 150 calories   32 oz sports drink = 200 calories, 16 oz punch = 240 calories   3.5 oz alcohol = 100-150 calories     4. Avoid High Fructose Corn Syrup products. This ingredient makes products highly addictive! 5. Exercise 30 minutes daily 5 days weekly, minimally. If you burn 3500 calories that equals a pound! Use a pedometer to count steps. Visit www. kooaba for a free pedometer and diet recommendations. Your maximum heart rate = 220 - your age    Never exercise at your maximum heart rate. See handout for target heart rate. 6.  Decrease carbohydrates (white bread, pasta, rice, potatoes, sweet foods and sweet drinks like soda, tea, coffee, juice and sports drinks). Increase fiber and protein. Goal:   calories daily of carbohydrates     Try CHROMIUM PICONATE 200 MG THREE TIMES DAILY,    to decrease Premenstrual carbohydrate cravings. 7.  Eat 3-5 small meals daily, include lots of protein (beans/legumes, nuts, lean meat, eggs) and green vegetables with each. (Breakfast, lunch, dinner with 2 healthy snacks)    8. Get proper rest 7-8 hours uninterrupted. When you get less than 6 hours, it triggers hunger by affecting your [place]:Leptin ratio and this results in weight gain. 9.  Watch your food portions. Green leafy vegetables should cover 1/2 of the plate, lean meat 1/4 of the plate, starchy vegetable 1/4 of the plate. Use smaller plates. 10.  Do not eat until you are full. Eat until you are no longer hungry.   If you are not sure, try drinking a glass of water before getting your second serving of food. 11.  Do not weigh yourself daily. Wait until your next office visit. Use how you feel and  how your clothes fit as measurements of success. 12.  Address your spirituality to draw strength from above during your journey. Remember \"I am fearfully and wonderfully made. Marvelous in His eyes. \"    13. Set realistic, appropriate and achievable weight loss goals:     RECOMMENDED TARGET WEIGHT LOSS:  Initial weight loss of 5-10% of your initial body weight achieved over 6 months or a decrease of 2 BMI units. MINIMUM GOAL OF WEIGHT LOSS:  Reduce body weight and maintain a lower body weight. Prevent weight gain. RELATED WEBSITES:      Www.obesityaction. org  (and consider joining the [employer] for $25/year. The [address] mission is to elevated and empower those affected by obesity through education, advocacy and support. Quarterly journals included in membership fee. )    Www.drchrono  www.[employer]     RELATED DIETS:  Dr. Baudilio Galindo Weight loss challenge, Mediterranean diet, [employer], [place] Franco Watchers, Paleo Diet (anti-inflammatory diet)        EXERCISE 150 MINUTES WEEKLY. THIS CAN BE ACHIEVED BY WORKING OUT OR WALKING A MINIMUM OF 30 MINUTES FOR 5 DAYS WEEKLY. YOU CAN EXERCISE IN INCREMENTS OF 10-15 MINUTES UP TO 3 TIMES A DAY. Consider performing \"brainless exercise. \"  Choose your favorite tv program.  Five minutes before and for 5 minutes after the tv program, stretch your body. While the program is on, walk in place watching the show. When commercials come on, rest or walk around the house to do other things. When the program begins, return to walking in place. When you are able keep walking during the commercials and add light weights to your ankles or hands. By the end of the show, you would have walked 30 minutes. If you need shorter spurts of exercise, walk during the commercials and rest during the show.     Drink to glasses of water prior to any exercise to prevent dehydration and to improve the results of the work out. Your RESTING HEART RATE is the number of times your heart beats per minute when you are not exerting yourself. The more fit you are, the lower your resting heart rate will be. Your MAXIMUM HEART RATE is the number of times per minute your heart pumps when it is working at 100% capacity. NEVER EXERCISE AT YOUR MAXIMUM HEART RATE. MAX HEART RATE = 220 - your age    For example, in a 48year old, the maximum heart rate is 220-50 = 170 beats per minute    Your [place] is the number of beats per minute our heart should pump during aerobic exercise. Reaching your target heart rate indicates that your body is receiving maximum cardiovascular and fat burning benefits. If you are fit, your TARGET HEART RATE = 70-85% of your maximum Heart rate      For a 48year old with vigorous intensity physical activity,         Target heart rate= 170 bpm x .70 = 119 bpm     Target heart rate = 170 bpm x .85=  145 bpm        Therefore, your target heart rate during physical activity is 119-145      If you are not fit, TARGET HEART RATE = 50-70% of your maximum Heart rate    MODERATE CONSISTENT AEROBIC EXERCISE OR WALKING FOR AT LEAST 150 MINUTES WEEKLY IS AN ESSENTIAL PART OF ANY WEIGHT LOSS OF WEIGHT MAINTENANCE PROGRAM.     During WEIGHT LOSS PHASE, at least 75% of your exercise needs to be walking or moderate aerobic activity and 25% or 0% can be Isometric or Resistance type exercises (the latter can be deferred to the weight maintenance phase.)     During WEIGHT MAINTENANCE PHASE, at least 50% of your exercise needs to be aerobic and 50% Isometric or Resistance type exercises. BENEFITS OF MODERATE INTENSITY EXERCISE MOST DAYS OF THE WEEK:     1. Insulin Resistance improves 30-85%   2. Abdominal Fat decreases by 30%   3. Inflammatory Markers decrease by 30% (therefore pain decreases)   4.   Systolic and Diastolic Blood Pressure decrease by 4 mmHg   5. HDL improves by 5%   6. Triglycerides decrease by 15%   7. Shift from small dense LDL to large dense LDL (therefore decrease Insulin Resistance)   8. Decrease coagulability                  Starting a Weight Loss Plan: Care Instructions  Your Care Instructions  If you are thinking about losing weight, it can be hard to know where to start. Your doctor can help you set up a weight loss plan that best meets your needs. You may want to take a class on nutrition or exercise, or join a weight loss support group. If you have questions about how to make changes to your eating or exercise habits, ask your doctor about seeing a registered dietitian or an exercise specialist.  It can be a big challenge to lose weight. But you do not have to make huge changes at once. Make small changes, and stick with them. When those changes become habit, add a few more changes. If you do not think you are ready to make changes right now, try to pick a date in the future. Make an appointment to see your doctor to discuss whether the time is right for you to start a plan. Follow-up care is a key part of your treatment and safety. Be sure to make and go to all appointments, and call your doctor if you are having problems. Its also a good idea to know your test results and keep a list of the medicines you take. How can you care for yourself at home? · Set realistic goals. Many people expect to lose much more weight than is likely. A weight loss of 5% to 10% of your body weight may be enough to improve your health. · Get family and friends involved to provide support. Talk to them about why you are trying to lose weight, and ask them to help. They can help by participating in exercise and having meals with you, even if they may be eating something different. · Find what works best for you.  If you do not have time or do not like to cook, a program that offers meal replacement bars or shakes may be better for you. Or if you like to prepare meals, finding a plan that includes daily menus and recipes may be best.  · Ask your doctor about other health professionals who can help you achieve your weight loss goals. ¨ A dietitian can help you make healthy changes in your diet. ¨ An exercise specialist or  can help you develop a safe and effective exercise program.  ¨ A counselor or psychiatrist can help you cope with issues such as depression, anxiety, or family problems that can make it hard to focus on weight loss. · Consider joining a support group for people who are trying to lose weight. Your doctor can suggest groups in your area. Where can you learn more? Go to http://bernardoiCrimefightermanju.info/. Enter T289 in the search box to learn more about \"Starting a Weight Loss Plan: Care Instructions. \"  Current as of: October 13, 2016  Content Version: 11.3  © 7016-7753 [employer]. Care instructions adapted under license by [employer] (which disclaims liability or warranty for this information). If you have questions about a medical condition or this instruction, always ask your healthcare professional. [place] [address] any warranty or liability for your use of this information. Check BP twice weekly. Notify me if it consistently is above 140/90 or below 90/60. Bring your blood pressure log to your next office visit. Decrease salt, fats, caffeine, alcohol in your diet. Increase water, fiber. Exercise 5 times weekly for 30 minutes daily as tolerated. Continue current medications, care and subspecialty follow up. Visit eye doctor yearly to check your eyes blood pressure related changes.

## 2017-08-22 NOTE — MR AVS SNAPSHOT
Visit Information Date & Time Provider Department Dept. Phone Encounter #  
 8/22/2017  2:00 PM DO Andreia HoganChi [phone] Follow-up Instructions Return in about 6 months (around 2/22/2018) for bp, thyroid, results. Upcoming Health Maintenance Date Due INFLUENZA AGE 9 TO ADULT 10/2/2017* COLONOSCOPY 10/31/2017* ZOSTER VACCINE AGE 60> 12/22/2017* BREAST CANCER SCRN MAMMOGRAM 3/17/2018 PAP AKA CERVICAL CYTOLOGY 1/19/2019 DTaP/Tdap/Td series (2 - Td) 10/18/2020 *Topic was postponed. The date shown is not the original due date. Allergies as of 8/22/2017  Review Complete On: 8/22/2017 By: Nida Baeza Severity Noted Reaction Type Reactions Aspirin Medium 02/04/2010   Intolerance Other (comments) Triggers asthma & causes chest tightness but takes 325 mg \"once in a while\" Lipitor [Atorvastatin]  04/11/2008    Myalgia Elevated CPK Sulfa (Sulfonamide Antibiotics)  01/12/2015   Side Effect Rash Erythromycin Low 02/04/2010   Side Effect Nausea and Vomiting Penicillins Low 02/04/2010   Side Effect Nausea and Vomiting Many years ago Current Immunizations  Reviewed on 2/21/2017 Name Date Hep A Vaccine (Adult) 3/5/2014 Hepatitis B Vaccine 4/29/2011, 11/14/2010, 10/14/2010 Influenza Vaccine 10/25/2016 Influenza Vaccine Alyne Jolly) 1/15/2016 Influenza Vaccine Split 10/9/2012, 11/22/2011 Influenza Vaccine Whole 10/14/2010 TD Vaccine 10/1/1999 TDAP Vaccine 10/18/2010 ZZZ-RETIRED (DO NOT USE) Pneumococcal Vaccine (Unspecified Type) 12/1/1998 Not reviewed this visit You Were Diagnosed With   
  
 Codes Comments Dyslipidemia    -  Primary ICD-10-CM: E78.5 ICD-9-CM: 272.4 Hypothyroidism due to acquired atrophy of thyroid     ICD-10-CM: E03.4 ICD-9-CM: 244.8, 246.8 Vitamin D deficiency     ICD-10-CM: E55.9 ICD-9-CM: 268.9 Polydipsia     ICD-10-CM: R63.1 ICD-9-CM: 783.5 Obesity, Class I, BMI 30-34.9     ICD-10-CM: E66.9 ICD-9-CM: 278.00 Seasonal allergic rhinitis due to pollen     ICD-10-CM: J30.1 ICD-9-CM: 477.0 stable off Astelin Mild intermittent asthma without complication     ERO-50-FO: J45.20 ICD-9-CM: 493.90 stable Family history of colon cancer     ICD-10-CM: Z80.0 ICD-9-CM: V16.0 Family history of carcinoma in situ of anal canal     ICD-10-CM: Z84.89 ICD-9-CM: V19.8 Status post biopsy of skin     ICD-10-CM: Z98.890 ICD-9-CM: V45.89 Hyperglycemia     ICD-10-CM: R73.9 ICD-9-CM: 790.29 Elevated BP without diagnosis of hypertension     ICD-10-CM: R03.0 ICD-9-CM: 796.2 Vitals BP Pulse Temp Resp Height(growth percentile) Weight(growth percentile) 147/85 (BP 1 Location: Left arm, BP Patient Position: Sitting) 65 98.7 °F (37.1 °C) (Oral) 16 5' 7\" (1.702 m) 205 lb 14.4 oz (93.4 kg) SpO2 BMI OB Status Smoking Status 97% 32.25 kg/m2 Postmenopausal Passive Smoke Exposure - Never Smoker Vitals History BMI and BSA Data Body Mass Index Body Surface Area  
 32.25 kg/m 2 2.1 m 2 Preferred Pharmacy Pharmacy Name Phone [phone], [address] [place] [address] Began [phone] Your Updated Medication List  
  
   
This list is accurate as of: 8/22/17  3:29 PM.  Always use your most recent med list.  
  
  
  
  
 albuterol-ipratropium 2.5 mg-0.5 mg/3 ml Nebu Commonly known as:  DUO-NEB  
3 mL by Nebulization route every six (6) hours. Azelastine 0.15 % (205.5 mcg) nasal spray Commonly known as:  ASTEPRO  
  
 cholecalciferol (VITAMIN D3) 5,000 unit Tab tablet Commonly known as:  VITAMIN D3 Take  by mouth daily. fluticasone-salmeterol 500-50 mcg/dose diskus inhaler Commonly known as:  ADVAIR DISKUS Take 1 Puff by inhalation two (2) times a day. levothyroxine 100 mcg tablet Commonly known as:  SYNTHROID Take 1 Tab by mouth Daily (before breakfast). And 2 on Sundays  
  
 montelukast 10 mg tablet Commonly known as:  SINGULAIR  
TAKE 1 TABLET BY MOUTH DAILY FOR ALLERGIES AND ASTHMA.  
  
 multivitamin tablet Commonly known as:  ONE A DAY Take 1 Tab by mouth daily. Nebulizer & Compressor machine 1 each by Does Not Apply route daily. pravastatin 20 mg tablet Commonly known as:  PRAVACHOL  
TAKE 1 TABLET BY MOUTH EVERY EVENING  
  
 PROAIR HFA 90 mcg/actuation inhaler Generic drug:  albuterol INHALE 1-2 PUFFS EVERY 4-6 HOURS AS NEEDED  
  
 sertraline 50 mg tablet Commonly known as:  ZOLOFT  
TAKE 1 TABLET BY MOUTH DAILY. SPIRIVA RESPIMAT 1.25 mcg/actuation inhaler Generic drug:  tiotropium bromide Take 2 Puffs by mouth nightly. triamcinolone 55 mcg nasal inhaler Commonly known as:  NASACORT AQ Use 2 sprays in each nostril everyday We Performed the Following [facility] BP FLOWSHEET [0068616028 CPT(R)] LIPID PANEL [78934 CPT(R)] T4, FREE [id number] CPT(R)] TSH 3RD GENERATION [99907 CPT(R)] Follow-up Instructions Return in about 6 months (around 2/22/2018) for bp, thyroid, results. Patient Instructions Learning About Low-Carbohydrate Diets for Weight Loss What is a low-carbohydrate diet? Low-carb diets avoid foods that are high in carbohydrate. These high-carb foods include pasta, bread, rice, cereal, fruits, and starchy vegetables. Instead, these diets usually have you eat foods that are high in fat and protein. Many people lose weight quickly on a low-carb diet. But the early weight loss is water. People on this diet often gain the weight back after they start eating carbs again. Not all diet plans are safe or work well. A lot of the evidence shows that low-carb diets aren't healthy.  That's because these diets often don't include healthy foods like fruits and vegetables. Losing weight safely means balancing protein, fat, and carbs with every meal and snack. And low-carb diets don't always provide the vitamins, minerals, and fiber you need. If you have a serious medical condition, talk to your doctor before you try any diet. These conditions include kidney disease, heart disease, type 2 diabetes, high cholesterol, and high blood pressure. If you are pregnant, it may not be safe for your baby if you are on a low-carb diet. How can you lose weight safely? You might have heard that a diet plan helped another person lose weight. But that doesn't mean that it will work for you. It is very hard to stay on a diet that includes lots of big changes in your eating habits. If you want to get to a healthy weight and stay there, making healthy lifestyle changes will often work better than dieting. These steps can help. · Make a plan for change. Work with your doctor to create a plan that is right for you. · See a dietitian. He or she can show you how to make healthy changes in your eating habits. · Manage stress. If you have a lot of stress in your life, it can be hard to focus on making healthy changes to your daily habits. · Track your food and activity. You are likely to do better at losing weight if you keep track of what you eat and what you do. Follow-up care is a key part of your treatment and safety. Be sure to make and go to all appointments, and call your doctor if you are having problems. It's also a good idea to know your test results and keep a list of the medicines you take. Where can you learn more? Go to http://bernardo-manju.info/. Enter A121 in the search box to learn more about \"Learning About Low-Carbohydrate Diets for Weight Loss. \" Current as of: December 8, 2016 Content Version: 11.3 © 8801-0631 [employer].  Care instructions adapted under license by [address] (which disclaims liability or warranty for this information). If you have questions about a medical condition or this instruction, always ask your healthcare professional. [address] any warranty or liability for your use of this information. Starting a Weight Loss Plan: Care Instructions Your Care Instructions If you are thinking about losing weight, it can be hard to know where to start. Your doctor can help you set up a weight loss plan that best meets your needs. You may want to take a class on nutrition or exercise, or join a weight loss support group. If you have questions about how to make changes to your eating or exercise habits, ask your doctor about seeing a registered dietitian or an exercise specialist. 
It can be a big challenge to lose weight. But you do not have to make huge changes at once. Make small changes, and stick with them. When those changes become habit, add a few more changes. If you do not think you are ready to make changes right now, try to pick a date in the future. Make an appointment to see your doctor to discuss whether the time is right for you to start a plan. Follow-up care is a key part of your treatment and safety. Be sure to make and go to all appointments, and call your doctor if you are having problems. Its also a good idea to know your test results and keep a list of the medicines you take. How can you care for yourself at home? · Set realistic goals. Many people expect to lose much more weight than is likely. A weight loss of 5% to 10% of your body weight may be enough to improve your health. · Get family and friends involved to provide support. Talk to them about why you are trying to lose weight, and ask them to help. They can help by participating in exercise and having meals with you, even if they may be eating something different. · Find what works best for you. If you do not have time or do not like to cook, a program that offers meal replacement bars or shakes may be better for you. Or if you like to prepare meals, finding a plan that includes daily menus and recipes may be best. 
· Ask your doctor about other health professionals who can help you achieve your weight loss goals. ¨ A dietitian can help you make healthy changes in your diet. ¨ An exercise specialist or  can help you develop a safe and effective exercise program. 
¨ A counselor or psychiatrist can help you cope with issues such as depression, anxiety, or family problems that can make it hard to focus on weight loss. · Consider joining a support group for people who are trying to lose weight. Your doctor can suggest groups in your area. Where can you learn more? Go to http://bernardoYgline.commanju.info/. Enter P745 in the search box to learn more about \"Starting a Weight Loss Plan: Care Instructions. \" Current as of: October 13, 2016 Content Version: 11.3 © 7895-7714 [employer]. Care instructions adapted under license by [employer] (which disclaims liability or warranty for this information). If you have questions about a medical condition or this instruction, always ask your healthcare professional. [address] any warranty or liability for your use of this information. Starting a Weight Loss Plan: Care Instructions Your Care Instructions If you are thinking about losing weight, it can be hard to know where to start. Your doctor can help you set up a weight loss plan that best meets your needs. You may want to take a class on nutrition or exercise, or join a weight loss support group.  If you have questions about how to make changes to your eating or exercise habits, ask your doctor about seeing a registered dietitian or an exercise specialist. 
 It can be a big challenge to lose weight. But you do not have to make huge changes at once. Make small changes, and stick with them. When those changes become habit, add a few more changes. If you do not think you are ready to make changes right now, try to pick a date in the future. Make an appointment to see your doctor to discuss whether the time is right for you to start a plan. Follow-up care is a key part of your treatment and safety. Be sure to make and go to all appointments, and call your doctor if you are having problems. Its also a good idea to know your test results and keep a list of the medicines you take. How can you care for yourself at home? · Set realistic goals. Many people expect to lose much more weight than is likely. A weight loss of 5% to 10% of your body weight may be enough to improve your health. · Get family and friends involved to provide support. Talk to them about why you are trying to lose weight, and ask them to help. They can help by participating in exercise and having meals with you, even if they may be eating something different. · Find what works best for you. If you do not have time or do not like to cook, a program that offers meal replacement bars or shakes may be better for you. Or if you like to prepare meals, finding a plan that includes daily menus and recipes may be best. 
· Ask your doctor about other health professionals who can help you achieve your weight loss goals. ¨ A dietitian can help you make healthy changes in your diet. ¨ An exercise specialist or  can help you develop a safe and effective exercise program. 
¨ A counselor or psychiatrist can help you cope with issues such as depression, anxiety, or family problems that can make it hard to focus on weight loss. · Consider joining a support group for people who are trying to lose weight. Your doctor can suggest groups in your area. Where can you learn more? Go to http://bernardo-manju.info/. Enter U628 in the search box to learn more about \"Starting a Weight Loss Plan: Care Instructions. \" Current as of: October 13, 2016 Content Version: 11.3 © 5814-8975 [employer]. Care instructions adapted under license by [employer] (which disclaims liability or warranty for this information). If you have questions about a medical condition or this instruction, always ask your healthcare professional. [address] any warranty or liability for your use of this information. WEIGHT LOSS PLAN 1. Read food labels and count calories. Goal 8364-1215 calories daily for women and 4468-1736 calories daily for men. Achieve this by decreasing caloric intake by 500 calories by weekly increments. NOTE:  1 pound = 3500 calories! Www.loseit. Air2Web Www.Khipu Systemsnesspal.Air2Web Www.0xdata. Air2Web Www.BabyFirstTV. gov Weight watchers mobile Calories needed to lose 1-2 pounds a week = 10 x your weight in pounds 2. Increase water intake. Per [facility], it is important to drink 1/2 your body weight in ounces of water daily. Decrease your water consumption 2-3 hours before bedtime to prevent sleep disturbance from frequent urination. 3.  Decrease sugary beverages. Each can or glass of soda increases your risk of obesity by 60%. Can lose 10 pounds in a month by avoiding any soda. 12 oz can of soda = 140 calories, 16 oz cup of sweet tea = 200 calories 16 oz orange juice = 200 calories, 10 oz apple juice = 150 calories 32 oz sports drink = 200 calories, 16 oz punch = 240 calories 3.5 oz alcohol = 100-150 calories 4. Avoid High Fructose Corn Syrup products. This ingredient makes products highly addictive! 5. Exercise 30 minutes daily 5 days weekly, minimally. If you burn 3500 calories that equals a pound! Use a pedometer to count steps.  Visit www.Favor. com for a free pedometer and diet recommendations. Your maximum heart rate = 220 - your age Never exercise at your maximum heart rate. See handout for target heart rate. 6.  Decrease carbohydrates (white bread, pasta, rice, potatoes, sweet foods and sweet drinks like soda, tea, coffee, juice and sports drinks). Increase fiber and protein. Goal:   calories daily of carbohydrates Try CHROMIUM PICONATE 200 MG THREE TIMES DAILY,  
 to decrease Premenstrual carbohydrate cravings. 7.  Eat 3-5 small meals daily, include lots of protein (beans/legumes, nuts, lean meat, eggs) and green vegetables with each. (Breakfast, lunch, dinner with 2 healthy snacks) 8. Get proper rest 7-8 hours uninterrupted. When you get less than 6 hours, it triggers hunger by affecting your Grehlin:Leptin ratio and this results in weight gain. 9.  Watch your food portions. Green leafy vegetables should cover 1/2 of the plate, lean meat 1/4 of the plate, starchy vegetable 1/4 of the plate. Use smaller plates. 10.  Do not eat until you are full. Eat until you are no longer hungry. If you are not sure, try drinking a glass of water before getting your second serving of food. 11.  Do not weigh yourself daily. Wait until your next office visit. Use how you feel and  how your clothes fit as measurements of success. 12.  Address your spirituality to draw strength from above during your journey. Remember \"I am fearfully and wonderfully made. Marvelous in His eyes. \" 
 
13. Set realistic, appropriate and achievable weight loss goals: 
 
 RECOMMENDED TARGET WEIGHT LOSS:  Initial weight loss of 5-10% of your initial body weight achieved over 6 months or a decrease of 2 BMI units. MINIMUM GOAL OF WEIGHT LOSS:  Reduce body weight and maintain a lower body weight. Prevent weight gain. RELATED WEBSITES:     
Www.obesityaction. org 
 (and consider joining the Obesity Action [employer] for $25/year. The [address] mission is to elevated and empower those affected by obesity through education, advocacy and support. Quarterly journals included in membership fee. ) Www.Multistat 
www.buuteeq RELATED DIETS:  Dr. Chalino Mcclellan Weight loss challenge, Mediterranean diet, [place] Diet, [employer] Franco Watchers, Paleo Diet (anti-inflammatory diet) EXERCISE 150 MINUTES WEEKLY. THIS CAN BE ACHIEVED BY WORKING OUT OR WALKING A MINIMUM OF 30 MINUTES FOR 5 DAYS WEEKLY. YOU CAN EXERCISE IN INCREMENTS OF 10-15 MINUTES UP TO 3 TIMES A DAY. Consider performing \"brainless exercise. \"  Choose your favorite tv program.  Five minutes before and for 5 minutes after the tv program, stretch your body. While the program is on, walk in place watching the show. When commercials come on, rest or walk around the house to do other things. When the program begins, return to walking in place. When you are able keep walking during the commercials and add light weights to your ankles or hands. By the end of the show, you would have walked 30 minutes. If you need shorter spurts of exercise, walk during the commercials and rest during the show. Drink to glasses of water prior to any exercise to prevent dehydration and to improve the results of the work out. Your RESTING HEART RATE is the number of times your heart beats per minute when you are not exerting yourself. The more fit you are, the lower your resting heart rate will be. Your MAXIMUM HEART RATE is the number of times per minute your heart pumps when it is working at 100% capacity. NEVER EXERCISE AT YOUR MAXIMUM HEART RATE. MAX HEART RATE = 220 - your age For example, in a 48year old, the maximum heart rate is 220-50 = 170 beats per minute Your TARGET HEART RATE is the number of beats per minute our heart should pump during aerobic exercise. Reaching your target heart rate indicates that your body is receiving maximum cardiovascular and fat burning benefits. If you are fit, your TARGET HEART RATE = 70-85% of your maximum Heart rate For a 48year old with vigorous intensity physical activity, Target heart rate= 170 bpm x .70 = 119 bpm 
   Target heart rate = 170 bpm x .85=  145 bpm 
 
    Therefore, your target heart rate during physical activity is 119-145 If you are not fit, TARGET HEART RATE = 50-70% of your maximum Heart rate MODERATE CONSISTENT AEROBIC EXERCISE OR WALKING FOR AT LEAST 150 MINUTES WEEKLY IS AN ESSENTIAL PART OF ANY WEIGHT LOSS OF WEIGHT MAINTENANCE PROGRAM. During WEIGHT LOSS PHASE, at least 75% of your exercise needs to be walking or moderate aerobic activity and 25% or 0% can be Isometric or Resistance type exercises (the latter can be deferred to the weight maintenance phase.) During WEIGHT MAINTENANCE PHASE, at least 50% of your exercise needs to be aerobic and 50% Isometric or Resistance type exercises. BENEFITS OF MODERATE INTENSITY EXERCISE MOST DAYS OF THE WEEK: 
 
 1. Insulin Resistance improves 30-85% 2. Abdominal Fat decreases by 30% 3. Inflammatory Markers decrease by 30% (therefore pain decreases) 4. Systolic and Diastolic Blood Pressure decrease by 4 mmHg 5. HDL improves by 5% 6. Triglycerides decrease by 15% 7. Shift from small dense LDL to large dense LDL (therefore decrease Insulin Resistance) 8. Decrease coagulability Starting a Weight Loss Plan: Care Instructions Your Care Instructions If you are thinking about losing weight, it can be hard to know where to start. Your doctor can help you set up a weight loss plan that best meets your needs. You may want to take a class on nutrition or exercise, or join a weight loss support group.  If you have questions about how to make changes to your eating or exercise habits, ask your doctor about seeing a registered dietitian or an exercise specialist. 
It can be a big challenge to lose weight. But you do not have to make huge changes at once. Make small changes, and stick with them. When those changes become habit, add a few more changes. If you do not think you are ready to make changes right now, try to pick a date in the future. Make an appointment to see your doctor to discuss whether the time is right for you to start a plan. Follow-up care is a key part of your treatment and safety. Be sure to make and go to all appointments, and call your doctor if you are having problems. Its also a good idea to know your test results and keep a list of the medicines you take. How can you care for yourself at home? · Set realistic goals. Many people expect to lose much more weight than is likely. A weight loss of 5% to 10% of your body weight may be enough to improve your health. · Get family and friends involved to provide support. Talk to them about why you are trying to lose weight, and ask them to help. They can help by participating in exercise and having meals with you, even if they may be eating something different. · Find what works best for you. If you do not have time or do not like to cook, a program that offers meal replacement bars or shakes may be better for you. Or if you like to prepare meals, finding a plan that includes daily menus and recipes may be best. 
· Ask your doctor about other health professionals who can help you achieve your weight loss goals. ¨ A dietitian can help you make healthy changes in your diet. ¨ An exercise specialist or  can help you develop a safe and effective exercise program. 
¨ A counselor or psychiatrist can help you cope with issues such as depression, anxiety, or family problems that can make it hard to focus on weight loss. · Consider joining a support group for people who are trying to lose weight. Your doctor can suggest groups in your area. Where can you learn more? Go to http://bernardo-manju.info/. Enter X399 in the search box to learn more about \"Starting a Weight Loss Plan: Care Instructions. \" Current as of: October 13, 2016 Content Version: 11.3 © 1320-6077 [employer]. Care instructions adapted under license by [employer] (which disclaims liability or warranty for this information). If you have questions about a medical condition or this instruction, always ask your healthcare professional. [place] [address] any warranty or liability for your use of this information. Check BP twice weekly. Notify me if it consistently is above 140/90 or below 90/60. Bring your blood pressure log to your next office visit. Decrease salt, fats, caffeine, alcohol in your diet. Increase water, fiber. Exercise 5 times weekly for 30 minutes daily as tolerated. Continue current medications, care and subspecialty follow up. Visit eye doctor yearly to check your eyes blood pressure related changes. Introducing [facility]! Dear Indio Ray: 
Thank you for requesting a [employer] account. Our records indicate that you already have an active [employer] account. You can access your account anytime at [email or web address] Did you know that you can access your hospital and ER discharge instructions at any time in [employer]? You can also review all of your test results from your hospital stay or ER visit. Additional Information If you have questions, please visit the Frequently Asked Questions section of the [employer] website at [email or web address] Remember, [employer] is NOT to be used for urgent needs. For medical emergencies, dial 911. Now available from your iPhone and Android! Please provide this summary of care documentation to your next provider. Your primary care clinician is listed as Lina Garrett. If you have any questions after today's visit, please call [phone].

## 2017-09-08 DIAGNOSIS — E03.9 ACQUIRED HYPOTHYROIDISM: ICD-10-CM

## 2017-09-08 RX ORDER — LEVOTHYROXINE SODIUM 100 UG/1
TABLET ORAL
Qty: 30 TAB | Refills: 0 | Status: SHIPPED | OUTPATIENT
Start: 2017-09-08 | End: 2018-02-22 | Stop reason: DRUGHIGH

## 2017-12-03 DIAGNOSIS — E03.9 ACQUIRED HYPOTHYROIDISM: ICD-10-CM

## 2017-12-04 NOTE — TELEPHONE ENCOUNTER
PCP: Brandin Campa DO    Last appt: 8/22/2017  Future Appointments  Date Time Provider Shahla Marleny   2/22/2018 10:00 AM Brandin Campa DO [facility]       Requested Prescriptions     Pending Prescriptions Disp Refills    levothyroxine (SYNTHROID) 100 mcg tablet [Pharmacy Med Name: LEVOTHYROXINE . 1MG G/L] 35 Tab 0     Sig: TAKE 1 TABLET DAILY BEFORE BREAKFAST.  TAKE 2 ON SUNDAYS           Pharmacy: [address]    Other Comments:  Lab Results   Component Value Date/Time    TSH 6.880 03/24/2017 08:38 AM    T4, Free 1.02 03/24/2017 08:38 AM

## 2017-12-06 RX ORDER — LEVOTHYROXINE SODIUM 100 UG/1
TABLET ORAL
Qty: 35 TAB | Refills: 0 | Status: SHIPPED | OUTPATIENT
Start: 2017-12-06 | End: 2018-02-22 | Stop reason: SDUPTHER

## 2017-12-11 DIAGNOSIS — J45.909 UNCOMPLICATED ASTHMA, UNSPECIFIED ASTHMA SEVERITY, UNSPECIFIED WHETHER PERSISTENT: Primary | ICD-10-CM

## 2017-12-11 RX ORDER — FLUTICASONE PROPIONATE AND SALMETEROL 50; 500 UG/1; UG/1
POWDER RESPIRATORY (INHALATION)
Qty: 1 INHALER | Refills: 12 | Status: SHIPPED | OUTPATIENT
Start: 2017-12-11 | End: 2019-01-13 | Stop reason: SDUPTHER

## 2018-02-06 NOTE — TELEPHONE ENCOUNTER
PCP: Josue Justice DO    Last appt: 8/22/2017  Future Appointments  Date Time Provider Shahla Keatingi   2/15/2018 8:20 AM LAB [facility] SCHED   2/22/2018 10:00 AM DO ROSE Rubin SCHED       Requested Prescriptions     Pending Prescriptions Disp Refills    levothyroxine (SYNTHROID) 100 mcg tablet [Pharmacy Med Name: LEVOTHYROXINE 100 MCG TABLET] 35 Tab 0     Sig: TAKE 1 TABLET DAILY BEFORE BREAKFAST.  TAKE 2 ON SUNDAYS     Lab Results   Component Value Date/Time    Sodium 146 (H) 03/24/2017 08:38 AM    Potassium 4.4 03/24/2017 08:38 AM    Chloride 103 03/24/2017 08:38 AM    CO2 23 03/24/2017 08:38 AM    Anion gap 10 01/26/2016 06:16 PM    Glucose 89 03/24/2017 08:38 AM    BUN 12 03/24/2017 08:38 AM    Creatinine 0.89 03/24/2017 08:38 AM    BUN/Creatinine ratio 13 03/24/2017 08:38 AM    GFR est AA 81 03/24/2017 08:38 AM    GFR est non-AA 71 03/24/2017 08:38 AM    Calcium 9.6 03/24/2017 08:38 AM     Lab Results   Component Value Date/Time    Hemoglobin A1c 5.8 (H) 03/24/2017 08:38 AM     Lab Results   Component Value Date/Time    Cholesterol, total 234 (H) 03/24/2017 08:38 AM    HDL Cholesterol 58 03/24/2017 08:38 AM    LDL, calculated 136 (H) 03/24/2017 08:38 AM    VLDL, calculated 40 03/24/2017 08:38 AM    Triglyceride 202 (H) 03/24/2017 08:38 AM    CHOL/HDL Ratio 7.3 (H) 02/04/2010 11:40 AM     Lab Results   Component Value Date/Time    TSH 6.880 (H) 03/24/2017 08:38 AM

## 2018-02-08 RX ORDER — LEVOTHYROXINE SODIUM 100 UG/1
TABLET ORAL
Qty: 35 TAB | Refills: 0 | Status: SHIPPED | OUTPATIENT
Start: 2018-02-08 | End: 2018-03-17 | Stop reason: SDUPTHER

## 2018-02-16 LAB
CHOLEST SERPL-MCNC: 223 MG/DL (ref 100–199)
HDLC SERPL-MCNC: 46 MG/DL
INTERPRETATION, 910389: NORMAL
LDLC SERPL CALC-MCNC: 134 MG/DL (ref 0–99)
T4 FREE SERPL-MCNC: 1.47 NG/DL (ref 0.82–1.77)
TRIGL SERPL-MCNC: 213 MG/DL (ref 0–149)
TSH SERPL DL<=0.005 MIU/L-ACNC: 0.45 UIU/ML (ref 0.45–4.5)
VLDLC SERPL CALC-MCNC: 43 MG/DL (ref 5–40)

## 2018-02-22 ENCOUNTER — OFFICE VISIT (OUTPATIENT)
Dept: FAMILY MEDICINE CLINIC | Age: 63
End: 2018-02-22

## 2018-02-22 VITALS
WEIGHT: 212.7 LBS | SYSTOLIC BLOOD PRESSURE: 137 MMHG | DIASTOLIC BLOOD PRESSURE: 76 MMHG | BODY MASS INDEX: 33.38 KG/M2 | RESPIRATION RATE: 18 BRPM | HEART RATE: 73 BPM | OXYGEN SATURATION: 98 % | TEMPERATURE: 98.8 F | HEIGHT: 67 IN

## 2018-02-22 DIAGNOSIS — Z82.62 FAMILY HISTORY OF OSTEOPOROSIS IN MOTHER: ICD-10-CM

## 2018-02-22 DIAGNOSIS — J84.10 GRANULOMATOUS LUNG DISEASE (HCC): ICD-10-CM

## 2018-02-22 DIAGNOSIS — Z83.3 FAMILY HISTORY OF DIABETES MELLITUS (DM): ICD-10-CM

## 2018-02-22 DIAGNOSIS — Z12.11 COLON CANCER SCREENING: ICD-10-CM

## 2018-02-22 DIAGNOSIS — R63.5 WEIGHT GAIN: ICD-10-CM

## 2018-02-22 DIAGNOSIS — Z82.3 FAMILY HISTORY OF STROKE: ICD-10-CM

## 2018-02-22 DIAGNOSIS — L30.8 OTHER ECZEMA: ICD-10-CM

## 2018-02-22 DIAGNOSIS — Z80.49 FAMILY HISTORY OF CERVICAL CANCER: ICD-10-CM

## 2018-02-22 DIAGNOSIS — J30.2 CHRONIC SEASONAL ALLERGIC RHINITIS DUE TO OTHER ALLERGEN: ICD-10-CM

## 2018-02-22 DIAGNOSIS — Z80.9 FAMILY HISTORY OF SQUAMOUS CELL CARCINOMA: ICD-10-CM

## 2018-02-22 DIAGNOSIS — R73.9 HYPERGLYCEMIA: ICD-10-CM

## 2018-02-22 DIAGNOSIS — Z96.642 HISTORY OF TOTAL LEFT HIP REPLACEMENT: ICD-10-CM

## 2018-02-22 DIAGNOSIS — E03.4 HYPOTHYROIDISM DUE TO ACQUIRED ATROPHY OF THYROID: ICD-10-CM

## 2018-02-22 DIAGNOSIS — Z81.8 FAMILY HISTORY OF DEMENTIA: ICD-10-CM

## 2018-02-22 DIAGNOSIS — Z82.62 FAMILY HISTORY OF OSTEOPOROSIS: ICD-10-CM

## 2018-02-22 DIAGNOSIS — E78.5 DYSLIPIDEMIA: Primary | ICD-10-CM

## 2018-02-22 DIAGNOSIS — E55.9 VITAMIN D DEFICIENCY: ICD-10-CM

## 2018-02-22 DIAGNOSIS — F32.5 MAJOR DEPRESSION IN COMPLETE REMISSION (HCC): ICD-10-CM

## 2018-02-22 DIAGNOSIS — Z12.39 BREAST CANCER SCREENING: ICD-10-CM

## 2018-02-22 DIAGNOSIS — Z82.49 FAMILY HISTORY OF BLOOD CLOTS: ICD-10-CM

## 2018-02-22 DIAGNOSIS — Z84.89 FAMILY HISTORY OF CARCINOMA IN SITU OF ANAL CANAL: ICD-10-CM

## 2018-02-22 DIAGNOSIS — E66.9 OBESITY, CLASS I, BMI 30-34.9: ICD-10-CM

## 2018-02-22 DIAGNOSIS — Z80.0 FAMILY HISTORY OF COLON CANCER: ICD-10-CM

## 2018-02-22 NOTE — MR AVS SNAPSHOT
[address] 
[address] [place] [address] 
[phone] Patient: Cornelius Gutierrez MRN:  [id number] Visit Information Date & Time Provider Department Dept. Phone Encounter #  
 2/22/2018 10:00 AM DO Navi QiuprabhaSatyasaad [phone] [id number] Follow-up Instructions Return for weight, results, blood pressure, lipids. Upcoming Health Maintenance Date Due  
 BREAST CANCER SCRN MAMMOGRAM 4/20/2018* COLONOSCOPY 4/20/2018* ZOSTER VACCINE AGE 60> 6/15/2018* PAP AKA CERVICAL CYTOLOGY 1/19/2019 DTaP/Tdap/Td series (2 - Td) 10/18/2020 *Topic was postponed. The date shown is not the original due date. Allergies as of 2/22/2018  Review Complete On: 2/22/2018 By: Daja Ovalle DO Severity Noted Reaction Type Reactions Aspirin Medium 02/04/2010   Intolerance Other (comments) Triggers asthma & causes chest tightness but takes 325 mg \"once in a while\" Lipitor [Atorvastatin]  04/11/2008    Myalgia Elevated CPK Sulfa (Sulfonamide Antibiotics)  01/12/2015   Side Effect Rash Erythromycin Low 02/04/2010   Side Effect Nausea and Vomiting Penicillins Low 02/04/2010   Side Effect Nausea and Vomiting Many years ago Current Immunizations  Reviewed on 2/22/2018 Name Date Hep A Vaccine (Adult) 3/5/2014 Hepatitis B Vaccine 4/29/2011, 11/14/2010, 10/14/2010 Influenza Vaccine 10/25/2016 Influenza Vaccine Chase Adriano) 1/15/2016 Influenza Vaccine Split 10/9/2012, 11/22/2011 Influenza Vaccine Whole 10/14/2010 TD Vaccine 10/1/1999 TDAP Vaccine 10/18/2010 ZZZ-RETIRED (DO NOT USE) Pneumococcal Vaccine (Unspecified Type) 12/1/1998 Reviewed by Daja Ovalle DO on 2/22/2018 at 11:50 AM  
You Were Diagnosed With   
  
 Codes Comments Dyslipidemia    -  Primary ICD-10-CM: E78.5 ICD-9-CM: 272.4 Hypothyroidism due to acquired atrophy of thyroid     ICD-10-CM: E03.4 ICD-9-CM: 244.8, 246.8 Hyperglycemia     ICD-10-CM: R73.9 ICD-9-CM: 790.29 Vitamin D deficiency     ICD-10-CM: E55.9 ICD-9-CM: 268.9 Family history of colon cancer     ICD-10-CM: Z80.0 ICD-9-CM: V16.0 biological father Colon cancer screening     ICD-10-CM: Z12.11 ICD-9-CM: V76.51 Breast cancer screening     ICD-10-CM: Z12.31 
ICD-9-CM: V76.10 Weight gain     ICD-10-CM: R63.5 ICD-9-CM: 783.1 7# since 08/2017 Granulomatous lung disease ([facility]     ICD-10-CM: J84.10 ICD-9-CM: 518.89 Family history of squamous cell carcinoma     ICD-10-CM: Z80.9 ICD-9-CM: V16.9 Family history of blood clots     ICD-10-CM: Z82.49 
ICD-9-CM: V18.3 Family history of dementia     ICD-10-CM: Z81.8 ICD-9-CM: V17.2 Family history of stroke     ICD-10-CM: Z82.3 ICD-9-CM: V17.1 Family history of diabetes mellitus (DM)     ICD-10-CM: Z83.3 ICD-9-CM: V18.0 Family history of carcinoma in situ of anal canal     ICD-10-CM: Z84.89 ICD-9-CM: V19.8 Family history of osteoporosis in mother     ICD-10-CM: Z80.64 
ICD-9-CM: V17.81 Other eczema     ICD-10-CM: L30.8 ICD-9-CM: 692.9 History of total left hip replacement     ICD-10-CM: B71.108 ICD-9-CM: V43.64 Family history of cervical cancer     ICD-10-CM: Z80.49 ICD-9-CM: V16.49 Major depression in complete remission ([facility].)     ICD-10-CM: F32.5 ICD-9-CM: 296.26 Obesity, Class I, BMI 30-34.9     ICD-10-CM: E66.9 ICD-9-CM: 278.00 Family history of osteoporosis     ICD-10-CM: Z82.62 
ICD-9-CM: V17.81 Chronic seasonal allergic rhinitis due to other allergen     ICD-10-CM: J30.2 ICD-9-CM: 477.8 worse since raking wet leaves Vitals BP Pulse Temp Resp Height(growth percentile) Weight(growth percentile)  
 137/76 73 98.8 °F (37.1 °C) (Oral) 18 5' 7\" (1.702 m) 212 lb 11.2 oz (96.5 kg) SpO2 BMI OB Status Smoking Status 98% 33.31 kg/m2 Postmenopausal Passive Smoke Exposure - Never Smoker BMI and BSA Data Body Mass Index Body Surface Area  
 33.31 kg/m 2 2.14 m 2 Preferred Pharmacy Pharmacy Name Phone [address], [address] [facility] [address] [phone] Your Updated Medication List  
  
   
This list is accurate as of 2/22/18 11:52 AM.  Always use your most recent med list.  
  
  
  
  
 ADVAIR DISKUS 500-50 mcg/dose diskus inhaler Generic drug:  fluticasone-salmeterol  
1 PUFF TWICE A DAY  
  
 albuterol-ipratropium 2.5 mg-0.5 mg/3 ml Nebu Commonly known as:  DUO-NEB  
3 mL by Nebulization route every six (6) hours. Azelastine 0.15 % (205.5 mcg) nasal spray Commonly known as:  ASTEPRO  
  
 cholecalciferol (VITAMIN D3) 5,000 unit Tab tablet Commonly known as:  VITAMIN D3 Take  by mouth daily. levothyroxine 100 mcg tablet Commonly known as:  SYNTHROID  
TAKE 1 TABLET DAILY BEFORE BREAKFAST. TAKE 2 ON SUNDAYS  
  
 montelukast 10 mg tablet Commonly known as:  SINGULAIR  
TAKE 1 TABLET BY MOUTH DAILY FOR ALLERGIES AND ASTHMA.  
  
 multivitamin tablet Commonly known as:  ONE A DAY Take 1 Tab by mouth daily. Nebulizer & Compressor machine 1 each by Does Not Apply route daily. pravastatin 20 mg tablet Commonly known as:  PRAVACHOL  
TAKE 1 TABLET BY MOUTH EVERY EVENING  
  
 PROAIR HFA 90 mcg/actuation inhaler Generic drug:  albuterol INHALE 1-2 PUFFS EVERY 4-6 HOURS AS NEEDED  
  
 sertraline 50 mg tablet Commonly known as:  ZOLOFT  
TAKE 1 TABLET BY MOUTH DAILY. triamcinolone 55 mcg nasal inhaler Commonly known as:  NASACORT AQ Use 2 sprays in each nostril everyday We Performed the Following REFERRAL TO [facility]ON AND RECT[facility] SURGERY [REF17 Custom] Comments:  
 fam his SCC anal, screening Follow-up Instructions Return for weight, results, blood pressure, lipids.   
  
To-Do List   
 03/19/2018 Imaging:  SURESH MAMMO BI SCREENING INCL CAD Referral Information Referral ID Referred By Referred To  
  
 [id number] [facility] [address] [facility] [address], [address] Visits Status Start Date End Date 1 New Request 2/22/18 2/22/19 If your referral has a status of pending review or denied, additional information will be sent to support the outcome of this decision. Patient Instructions Learning About Low-Carbohydrate Diets for Weight Loss What is a low-carbohydrate diet? Low-carb diets avoid foods that are high in carbohydrate. These high-carb foods include pasta, bread, rice, cereal, fruits, and starchy vegetables. Instead, these diets usually have you eat foods that are high in fat and protein. Many people lose weight quickly on a low-carb diet. But the early weight loss is water. People on this diet often gain the weight back after they start eating carbs again. Not all diet plans are safe or work well. A lot of the evidence shows that low-carb diets aren't healthy. That's because these diets often don't include healthy foods like fruits and vegetables. Losing weight safely means balancing protein, fat, and carbs with every meal and snack. And low-carb diets don't always provide the vitamins, minerals, and fiber you need. If you have a serious medical condition, talk to your doctor before you try any diet. These conditions include kidney disease, heart disease, type 2 diabetes, high cholesterol, and high blood pressure. If you are pregnant, it may not be safe for your baby if you are on a low-carb diet. How can you lose weight safely? You might have heard that a diet plan helped another person lose weight. But that doesn't mean that it will work for you.  
It is very hard to stay on a diet that includes lots of big changes in your eating habits. If you want to get to a healthy weight and stay there, making healthy lifestyle changes will often work better than dieting. These steps can help. · Make a plan for change. Work with your doctor to create a plan that is right for you. · See a dietitian. He or she can show you how to make healthy changes in your eating habits. · Manage stress. If you have a lot of stress in your life, it can be hard to focus on making healthy changes to your daily habits. · Track your food and activity. You are likely to do better at losing weight if you keep track of what you eat and what you do. Follow-up care is a key part of your treatment and safety. Be sure to make and go to all appointments, and call your doctor if you are having problems. It's also a good idea to know your test results and keep a list of the medicines you take. Where can you learn more? Go to http://bernardoNeuralStemmanju.info/. Enter A121 in the search box to learn more about \"Learning About Low-Carbohydrate Diets for Weight Loss. \" Current as of: May 12, 2017 Content Version: 11.4 © 9233-5720 Healthwise, The miqi.cn. Care instructions adapted under license by [employer] (which disclaims liability or warranty for this information). If you have questions about a medical condition or this instruction, always ask your healthcare professional. [address] any warranty or liability for your use of this information. Starting a Weight Loss Plan: Care Instructions Your Care Instructions If you are thinking about losing weight, it can be hard to know where to start. Your doctor can help you set up a weight loss plan that best meets your needs. You may want to take a class on nutrition or exercise, or join a weight loss support group.  If you have questions about how to make changes to your eating or exercise habits, ask your doctor about seeing a registered dietitian or an exercise specialist. 
It can be a big challenge to lose weight. But you do not have to make huge changes at once. Make small changes, and stick with them. When those changes become habit, add a few more changes. If you do not think you are ready to make changes right now, try to pick a date in the future. Make an appointment to see your doctor to discuss whether the time is right for you to start a plan. Follow-up care is a key part of your treatment and safety. Be sure to make and go to all appointments, and call your doctor if you are having problems. It's also a good idea to know your test results and keep a list of the medicines you take. How can you care for yourself at home? · Set realistic goals. Many people expect to lose much more weight than is likely. A weight loss of 5% to 10% of your body weight may be enough to improve your health. · Get family and friends involved to provide support. Talk to them about why you are trying to lose weight, and ask them to help. They can help by participating in exercise and having meals with you, even if they may be eating something different. · Find what works best for you. If you do not have time or do not like to cook, a program that offers meal replacement bars or shakes may be better for you. Or if you like to prepare meals, finding a plan that includes daily menus and recipes may be best. 
· Ask your doctor about other health professionals who can help you achieve your weight loss goals. ¨ A dietitian can help you make healthy changes in your diet. ¨ An exercise specialist or  can help you develop a safe and effective exercise program. 
¨ A counselor or psychiatrist can help you cope with issues such as depression, anxiety, or family problems that can make it hard to focus on weight loss. · Consider joining a support group for people who are trying to lose weight. Your doctor can suggest groups in your area. Where can you learn more? Go to http://bernardo-manju.info/. Enter R780 in the search box to learn more about \"Starting a Weight Loss Plan: Care Instructions. \" Current as of: October 13, 2016 Content Version: 11.4 © 6166-1929 [employer]. Care instructions adapted under license by [employer] (which disclaims liability or warranty for this information). If you have questions about a medical condition or this instruction, always ask your healthcare professional. [address] any warranty or liability for your use of this information. High Cholesterol: Care Instructions Your Care Instructions Cholesterol is a type of fat in your blood. It is needed for many body functions, such as making new cells. Cholesterol is made by your body. It also comes from food you eat. High cholesterol means that you have too much of the fat in your blood. This raises your risk of a heart attack and stroke. LDL and HDL are part of your total cholesterol. LDL is the \"bad\" cholesterol. High LDL can raise your risk for heart disease, heart attack, and stroke. HDL is the \"good\" cholesterol. It helps clear bad cholesterol from the body. High HDL is linked with a lower risk of heart disease, heart attack, and stroke. Your cholesterol levels help your doctor find out your risk for having a heart attack or stroke. You and your doctor can talk about whether you need to lower your risk and what treatment is best for you. A heart-healthy lifestyle along with medicines can help lower your cholesterol and your risk. The way you choose to lower your risk will depend on how high your risk is for heart attack and stroke. It will also depend on how you feel about taking medicines. Follow-up care is a key part of your treatment and safety.  Be sure to make and go to all appointments, and call your doctor if you are having problems. It's also a good idea to know your test results and keep a list of the medicines you take. How can you care for yourself at home? · Eat a variety of foods every day. Good choices include fruits, vegetables, whole grains (like oatmeal), dried beans and peas, nuts and seeds, soy products (like tofu), and fat-free or low-fat dairy products. · Replace butter, margarine, and hydrogenated or partially hydrogenated oils with olive and canola oils. (Canola oil margarine without trans fat is fine.) · Replace red meat with fish, poultry, and soy protein (like tofu). · Limit processed and packaged foods like chips, crackers, and cookies. · Bake, broil, or steam foods. Don't davalos them. · Be physically active. Get at least 30 minutes of exercise on most days of the week. Walking is a good choice. You also may want to do other activities, such as running, swimming, cycling, or playing tennis or team sports. · Stay at a healthy weight or lose weight by making the changes in eating and physical activity listed above. Losing just a small amount of weight, even 5 to 10 pounds, can reduce your risk for having a heart attack or stroke. · Do not smoke. When should you call for help? Watch closely for changes in your health, and be sure to contact your doctor if: 
? · You need help making lifestyle changes. ? · You have questions about your medicine. Where can you learn more? Go to http://bernardo-manju.info/. Enter P942 in the search box to learn more about \"High Cholesterol: Care Instructions. \" Current as of: September 21, 2016 Content Version: 11.4 © 5875-0678 [employer]. Care instructions adapted under license by [employer] (which disclaims liability or warranty for this information).  If you have questions about a medical condition or this instruction, always ask your healthcare professional. Gaby Dumont, [facility] disclaims any warranty or liability for your use of this information. Preventing a Relapse of Depression: Care Instructions Your Care Instructions A relapse of depression means your symptoms have come back after you have gotten better. This illness often comes and goes during a lifetime. But there are many things you can do to keep it from coming back. Follow-up care is a key part of your treatment and safety. Be sure to make and go to all appointments, and call your doctor if you are having problems. It's also a good idea to know your test results and keep a list of the medicines you take. What do you need to know? Know your risk of relapse Talk to your doctor to find out if you are at risk of relapse. Many things can make a person more likely to relapse into depression. These include having a family member with depression, dealing with serious problems in a relationship or a job, having a serious medical condition, or abusing drugs or alcohol. It is important to know your risk and to recognize warning signs of relapse. Once you know these things, you will be better able to keep it from happening to you. Know the warning signs of relapse The two most common signs of relapse are: · Feeling sad or hopeless. · Losing interest in your daily activities. You may have other symptoms, such as: 
· You lose or gain weight. · You sleep too much or not enough. · You feel restless and unable to sit still. · You feel unable to move. · You feel tired all the time. · You feel unworthy or guilty without an obvious reason. · You have problems concentrating, remembering, or making decisions. · You think often about death or suicide. · You feel angry or have panic attacks. How can you care for yourself at home? · Take your medicine as prescribed. Call your doctor if you have any problems with your medicine.  Many people take their medicines for at least 6 months after they have recovered. This often helps keep symptoms from coming back. However, if your depression keeps coming back, you may have to take medicine for the rest of your life. · Continue counseling even after you have stopped taking medicine. · Eat healthy foods. Include fruits, vegetables, beans, and whole grains in your diet each day. · Get at least 30 minutes of exercise on most days of the week. Walking is a good choice. You also may want to do other activities, such as running, swimming, cycling, or playing tennis or team sports. · See your doctor right away if you have new symptoms or feel that your depression is coming back. · Keep a regular sleep schedule. Try for 8 hours of sleep a night. · Avoid alcohol and illegal drugs. · Keep the numbers for these national suicide hotlines: 0-193-051-TALK (7-913.986.7012) and 0-588-ZFDLOQO (6-398.212.8659). If you or someone you know talks about suicide or feeling hopeless, get help right away. When should you call for help? Call 911 anytime you think you may need emergency care. For example, call if: 
? · You are thinking about suicide or are threatening suicide. ? · You feel you cannot stop from hurting yourself or someone else. ? · You hear or see things that aren't real.  
? · You think or speak in a bizarre way that is not like your usual behavior. ?Call your doctor now or seek immediate medical care if: 
? · You are drinking a lot of alcohol or using illegal drugs. ? · You are talking or writing about death. ? Watch closely for changes in your health, and be sure to contact your doctor if: 
? · You find it hard or it's getting harder to deal with school, a job, family, or friends. ? · You think your treatment is not helping or you are not getting better. ? · Your symptoms get worse or you get new symptoms.   
? · You have any problems with your antidepressant medicines, such as side effects, or you are thinking about stopping your medicine. ? · You are having manic behavior, such as having very high energy, needing less sleep than normal, or showing risky behavior such as spending money you don't have or abusing others verbally or physically. Where can you learn more? Go to http://bernardo-manju.info/. Enter C540 in the search box to learn more about \"Preventing a Relapse of Depression: Care Instructions. \" Current as of: May 12, 2017 Content Version: 11.4 © 7434-3471 [employer]. Care instructions adapted under license by [employer] (which disclaims liability or warranty for this information). If you have questions about a medical condition or this instruction, always ask your healthcare professional. [address] any warranty or liability for your use of this information. Introducing [facility]! Dear Hyun العلي: 
Thank you for requesting a [facility] account. Our records indicate that you already have an active [facility] account. You can access your account anytime at [email or web address] Did you know that you can access your hospital and ER discharge instructions at any time in [facility]? You can also review all of your test results from your hospital stay or ER visit. Additional Information If you have questions, please visit the Frequently Asked Questions section of the [facility] website at [email or web address] Remember, [facility] is NOT to be used for urgent needs. For medical emergencies, dial 911. Now available from your iPhone and Android! Please provide this summary of care documentation to your next provider. Your primary care clinician is listed as Brandon Redd. If you have any questions after today's visit, please call [phone].

## 2018-02-22 NOTE — PATIENT INSTRUCTIONS
Learning About Low-Carbohydrate Diets for Weight Loss  What is a low-carbohydrate diet? Low-carb diets avoid foods that are high in carbohydrate. These high-carb foods include pasta, bread, rice, cereal, fruits, and starchy vegetables. Instead, these diets usually have you eat foods that are high in fat and protein. Many people lose weight quickly on a low-carb diet. But the early weight loss is water. People on this diet often gain the weight back after they start eating carbs again. Not all diet plans are safe or work well. A lot of the evidence shows that low-carb diets aren't healthy. That's because these diets often don't include healthy foods like fruits and vegetables. Losing weight safely means balancing protein, fat, and carbs with every meal and snack. And low-carb diets don't always provide the vitamins, minerals, and fiber you need. If you have a serious medical condition, talk to your doctor before you try any diet. These conditions include kidney disease, heart disease, type 2 diabetes, high cholesterol, and high blood pressure. If you are pregnant, it may not be safe for your baby if you are on a low-carb diet. How can you lose weight safely? You might have heard that a diet plan helped another person lose weight. But that doesn't mean that it will work for you. It is very hard to stay on a diet that includes lots of big changes in your eating habits. If you want to get to a healthy weight and stay there, making healthy lifestyle changes will often work better than dieting. These steps can help. · Make a plan for change. Work with your doctor to create a plan that is right for you. · See a dietitian. He or she can show you how to make healthy changes in your eating habits. · Manage stress. If you have a lot of stress in your life, it can be hard to focus on making healthy changes to your daily habits. · Track your food and activity.  You are likely to do better at losing weight if you keep track of what you eat and what you do. Follow-up care is a key part of your treatment and safety. Be sure to make and go to all appointments, and call your doctor if you are having problems. It's also a good idea to know your test results and keep a list of the medicines you take. Where can you learn more? Go to http://bernardo-manju.info/. Enter A121 in the search box to learn more about \"Learning About Low-Carbohydrate Diets for Weight Loss. \"  Current as of: May 12, 2017  Content Version: 11.4  © 9808-7144 [employer]. Care instructions adapted under license by [employer] (which disclaims liability or warranty for this information). If you have questions about a medical condition or this instruction, always ask your healthcare professional. [address] any warranty or liability for your use of this information. Starting a Weight Loss Plan: Care Instructions  Your Care Instructions    If you are thinking about losing weight, it can be hard to know where to start. Your doctor can help you set up a weight loss plan that best meets your needs. You may want to take a class on nutrition or exercise, or join a weight loss support group. If you have questions about how to make changes to your eating or exercise habits, ask your doctor about seeing a registered dietitian or an exercise specialist.  It can be a big challenge to lose weight. But you do not have to make huge changes at once. Make small changes, and stick with them. When those changes become habit, add a few more changes. If you do not think you are ready to make changes right now, try to pick a date in the future. Make an appointment to see your doctor to discuss whether the time is right for you to start a plan. Follow-up care is a key part of your treatment and safety. Be sure to make and go to all appointments, and call your doctor if you are having problems.  It's also a good idea to know your test results and keep a list of the medicines you take. How can you care for yourself at home? · Set realistic goals. Many people expect to lose much more weight than is likely. A weight loss of 5% to 10% of your body weight may be enough to improve your health. · Get family and friends involved to provide support. Talk to them about why you are trying to lose weight, and ask them to help. They can help by participating in exercise and having meals with you, even if they may be eating something different. · Find what works best for you. If you do not have time or do not like to cook, a program that offers meal replacement bars or shakes may be better for you. Or if you like to prepare meals, finding a plan that includes daily menus and recipes may be best.  · Ask your doctor about other health professionals who can help you achieve your weight loss goals. ¨ A dietitian can help you make healthy changes in your diet. ¨ An exercise specialist or  can help you develop a safe and effective exercise program.  ¨ A counselor or psychiatrist can help you cope with issues such as depression, anxiety, or family problems that can make it hard to focus on weight loss. · Consider joining a support group for people who are trying to lose weight. Your doctor can suggest groups in your area. Where can you learn more? Go to http://bernardo-manju.info/. Enter M944 in the search box to learn more about \"Starting a Weight Loss Plan: Care Instructions. \"  Current as of: October 13, 2016  Content Version: 11.4  © 5415-7742 [employer]. Care instructions adapted under license by [employer] (which disclaims liability or warranty for this information). If you have questions about a medical condition or this instruction, always ask your healthcare professional. [address] any warranty or liability for your use of this information. High Cholesterol: Care Instructions  Your Care Instructions    Cholesterol is a type of fat in your blood. It is needed for many body functions, such as making new cells. Cholesterol is made by your body. It also comes from food you eat. High cholesterol means that you have too much of the fat in your blood. This raises your risk of a heart attack and stroke. LDL and HDL are part of your total cholesterol. LDL is the \"bad\" cholesterol. High LDL can raise your risk for heart disease, heart attack, and stroke. HDL is the \"good\" cholesterol. It helps clear bad cholesterol from the body. High HDL is linked with a lower risk of heart disease, heart attack, and stroke. Your cholesterol levels help your doctor find out your risk for having a heart attack or stroke. You and your doctor can talk about whether you need to lower your risk and what treatment is best for you. A heart-healthy lifestyle along with medicines can help lower your cholesterol and your risk. The way you choose to lower your risk will depend on how high your risk is for heart attack and stroke. It will also depend on how you feel about taking medicines. Follow-up care is a key part of your treatment and safety. Be sure to make and go to all appointments, and call your doctor if you are having problems. It's also a good idea to know your test results and keep a list of the medicines you take. How can you care for yourself at home? · Eat a variety of foods every day. Good choices include fruits, vegetables, whole grains (like oatmeal), dried beans and peas, nuts and seeds, soy products (like tofu), and fat-free or low-fat dairy products. · Replace butter, margarine, and hydrogenated or partially hydrogenated oils with olive and canola oils. (Canola oil margarine without trans fat is fine.)  · Replace red meat with fish, poultry, and soy protein (like tofu). · Limit processed and packaged foods like chips, crackers, and cookies.   · Bake, broil, or steam foods. Don't davalos them. · Be physically active. Get at least 30 minutes of exercise on most days of the week. Walking is a good choice. You also may want to do other activities, such as running, swimming, cycling, or playing tennis or team sports. · Stay at a healthy weight or lose weight by making the changes in eating and physical activity listed above. Losing just a small amount of weight, even 5 to 10 pounds, can reduce your risk for having a heart attack or stroke. · Do not smoke. When should you call for help? Watch closely for changes in your health, and be sure to contact your doctor if:  ? · You need help making lifestyle changes. ? · You have questions about your medicine. Where can you learn more? Go to http://bernardo-manju.info/. Enter O397 in the search box to learn more about \"High Cholesterol: Care Instructions. \"  Current as of: September 21, 2016  Content Version: 11.4  © 1042-8900 [employer]. Care instructions adapted under license by [email or web address] (which disclaims liability or warranty for this information). If you have questions about a medical condition or this instruction, always ask your healthcare professional. [place] [address] any warranty or liability for your use of this information. Preventing a Relapse of Depression: Care Instructions  Your Care Instructions    A relapse of depression means your symptoms have come back after you have gotten better. This illness often comes and goes during a lifetime. But there are many things you can do to keep it from coming back. Follow-up care is a key part of your treatment and safety. Be sure to make and go to all appointments, and call your doctor if you are having problems. It's also a good idea to know your test results and keep a list of the medicines you take. What do you need to know?   Know your risk of relapse  Talk to your doctor to find out if you are at risk of relapse. Many things can make a person more likely to relapse into depression. These include having a family member with depression, dealing with serious problems in a relationship or a job, having a serious medical condition, or abusing drugs or alcohol. It is important to know your risk and to recognize warning signs of relapse. Once you know these things, you will be better able to keep it from happening to you. Know the warning signs of relapse  The two most common signs of relapse are:  · Feeling sad or hopeless. · Losing interest in your daily activities. You may have other symptoms, such as:  · You lose or gain weight. · You sleep too much or not enough. · You feel restless and unable to sit still. · You feel unable to move. · You feel tired all the time. · You feel unworthy or guilty without an obvious reason. · You have problems concentrating, remembering, or making decisions. · You think often about death or suicide. · You feel angry or have panic attacks. How can you care for yourself at home? · Take your medicine as prescribed. Call your doctor if you have any problems with your medicine. Many people take their medicines for at least 6 months after they have recovered. This often helps keep symptoms from coming back. However, if your depression keeps coming back, you may have to take medicine for the rest of your life. · Continue counseling even after you have stopped taking medicine. · Eat healthy foods. Include fruits, vegetables, beans, and whole grains in your diet each day. · Get at least 30 minutes of exercise on most days of the week. Walking is a good choice. You also may want to do other activities, such as running, swimming, cycling, or playing tennis or team sports. · See your doctor right away if you have new symptoms or feel that your depression is coming back. · Keep a regular sleep schedule. Try for 8 hours of sleep a night.   · Avoid alcohol and illegal drugs.  · Keep the numbers for these national suicide hotlines: 7-783-811-TALK (1-103.603.9811) and 8-724-NPYOHAA (2-792.213.6634). If you or someone you know talks about suicide or feeling hopeless, get help right away. When should you call for help? Call 911 anytime you think you may need emergency care. For example, call if:  ? · You are thinking about suicide or are threatening suicide. ? · You feel you cannot stop from hurting yourself or someone else. ? · You hear or see things that aren't real.   ? · You think or speak in a bizarre way that is not like your usual behavior. ?Call your doctor now or seek immediate medical care if:  ? · You are drinking a lot of alcohol or using illegal drugs. ? · You are talking or writing about death. ? Watch closely for changes in your health, and be sure to contact your doctor if:  ? · You find it hard or it's getting harder to deal with school, a job, family, or friends. ? · You think your treatment is not helping or you are not getting better. ? · Your symptoms get worse or you get new symptoms. ? · You have any problems with your antidepressant medicines, such as side effects, or you are thinking about stopping your medicine. ? · You are having manic behavior, such as having very high energy, needing less sleep than normal, or showing risky behavior such as spending money you don't have or abusing others verbally or physically. Where can you learn more? Go to http://bernardo-manju.info/. Enter A984 in the search box to learn more about \"Preventing a Relapse of Depression: Care Instructions. \"  Current as of: May 12, 2017  Content Version: 11.4  © 8490-9765 [employer]. Care instructions adapted under license by [employer] (which disclaims liability or warranty for this information).  If you have questions about a medical condition or this instruction, always ask your healthcare professional. [employer] disclaims any warranty or liability for your use of this information.

## 2018-02-22 NOTE — PROGRESS NOTES
Chief Complaint   Patient presents with    Hypertension    Thyroid Problem    Results     1. Have you been to the ER, urgent care clinic since your last visit? Hospitalized since your last visit? No    2. Have you seen or consulted any other health care providers outside of the [address] [place] [address] since your last visit? Include any pap smears or colon screening.  Yes When: -Pulmonary(last visit-Feb 13 2018) & Dermatologist- May 2018

## 2018-02-22 NOTE — PROGRESS NOTES
HISTORY OF PRESENT ILLNESS  Kathee Habermann is a 65 y.o. female presents with Cholesterol Problem; Thyroid Problem; Results; and Referral Follow Up    Agree with nurse note. Dyslipidemic, hyperglycemic pt with hypothyroidism,vit d deficiency, and family hx of osteoporosis, blood clots, dementia, stroke, DM, and osteoporosis presents to the office with a BP of 137/76. She had labs on 02/15/18. TSH 0.45, down from 6.88. FT4 1.47, up from 1.02. She is taking Synthroid 100 mcg daily with 2 tabs on Sundays; denies sxs of hyperthyroid. , down from 136. Trigs 213, up from 202. She is taking Pravachol 20 mg daily, tolerating well. Previously intolerant of Lipitor. She weighs 212 lbs, up 7 lbs since 08/2017. She plans to be more active since the weather is becoming warmer. Pt with asthma, chronic sinusitis and abnormal CXR. She reports seeing pulmonologist, Dr. Franny Encarnacion last week. She uses Advair 500-50 mcg BID and is off Spiriva. She uses Nasacort daily. She had exposure to [address] Dr. Bradshaw Nurse noted stable CXR x2 years so follow up on this prn. She has had some recent nasal congestion after raking wet leaves. Pt with eczema and family hx of SCC. Her next visit with her dermatologist, Dr. Sebas Sellers is 05/2018. Depression is stable on 1/2 tab of Zoloft 50 mg daily. Declines Rx for 25 mg tablets. Health Maintenance    Pt with fam hx of colon ca and anal ca. Her most recent colonoscopy was on 04/07/08 with GI, Dr. Nickie Taylor. F/U 5 years. Pt with family hx of cervical ca. Her last pap smear was on 01/19/16; normal and HPV negative. Her most recent mammogram was on 03/17/17; normal.     Written by mateusz Zapata, as dictated by Dr. Laith Coe DO.    MOISÉS    Review of Systems negative except as noted above in HPI.     ALLERGIES:    Allergies   Allergen Reactions    Aspirin Other (comments)     Triggers asthma & causes chest tightness but takes 325 mg \"once in a while\"    Lipitor [Atorvastatin] Myalgia     Elevated CPK    Sulfa (Sulfonamide Antibiotics) Rash    Erythromycin Nausea and Vomiting    Penicillins Nausea and Vomiting     Many years ago       CURRENT MEDICATIONS:    Outpatient Prescriptions Marked as Taking for the 2/22/18 encounter (Office Visit) with Laurie Feng DO   Medication Sig Dispense Refill    levothyroxine (SYNTHROID) 100 mcg tablet TAKE 1 TABLET DAILY BEFORE BREAKFAST. TAKE 2 ON SUNDAYS 35 Tab 0    ADVAIR DISKUS 500-50 mcg/dose diskus inhaler 1 PUFF TWICE A DAY 1 Inhaler 12    pravastatin (PRAVACHOL) 20 mg tablet TAKE 1 TABLET BY MOUTH EVERY EVENING 30 Tab 5    cholecalciferol, VITAMIN D3, (VITAMIN D3) 5,000 unit tab tablet Take  by mouth daily.  sertraline (ZOLOFT) 50 mg tablet TAKE 1 TABLET BY MOUTH DAILY. (Patient taking differently: take 1/2 tablet daily) 90 Tab 2    montelukast (SINGULAIR) 10 mg tablet TAKE 1 TABLET BY MOUTH DAILY FOR ALLERGIES AND ASTHMA. 30 Tab 11    triamcinolone (NASACORT AQ) 55 mcg nasal inhaler Use 2 sprays in each nostril everyday 1 Bottle 5    multivitamin (ONE A DAY) tablet Take 1 Tab by mouth daily.  albuterol-ipratropium (DUO-NEB) 2.5 mg-0.5 mg/3 ml nebulizer solution 3 mL by Nebulization route every six (6) hours. 30 Vial 1    Nebulizer & Compressor machine 1 each by Does Not Apply route daily. 1 each 0    PROAIR HFA 90 mcg/actuation inhaler INHALE 1-2 PUFFS EVERY 4-6 HOURS AS NEEDED 1 Inhaler 5       PAST MEDICAL HISTORY:    Past Medical History:   Diagnosis Date    Adjustment disorder 08/2012    Dr. Ant Whitman. Mary Harrell, counselor    Allergic rhinitis 2016    Dr. Jerry López [facility], unspecified 01/25/07    Asthma 1980    Dr. Sera Torres, pulm. [facility] inhalation.  Chickenpox childhood    Cutaneous wart 05/2015    Right thumb. [facility]. Dr. Rafael Abreu.  Depression     Digital mucous cyst 05/2015    Left IP joint. Dr. Citlali Stacy.     Diverticulosis     Dr. Prachi Rome    DJD (degenerative joint disease) of hip 09/19/12    Dr. Yunior Michael    Eczema 2016    Dr. Murray New liver 2006    Granulomatous lung disease ([facility] [address] 2017    stable. Dr. Babak Servin.  Hearing aid worn 09/2014    Bilateral ears.  Hematuria 65/8874    Hepatitis B immune March 2014    Prior vaccine--[id number].  High cholesterol 1976    Hypothyroid 10/16/2006    Knee pain     having surgery on 2/12/13 left    Left hip pain 2013    Dr. Boyce Speaks Measles childhood    Menopause 11/2006    Mumps childhood    Ovarian cyst 02/21/2008    Left and Rt . Mrs. Antonella Waters    Pneumonia 1981    x 2    PONV (postoperative nausea and vomiting)     Scarlet fever 1971    Uterine fibroid 02/21/08    Keyon Osuna, Midwife       PAST SURGICAL HISTORY:    Past Surgical History:   Procedure Laterality Date    HX COLONOSCOPY  04/07/08    Dr. Prachi Rome. due q 5 yrs.  HX HIP REPLACEMENT Left 02/12/13    Left.  with Anterior approach. Dr. Danielle Gonzalez.  HX ORTHOPAEDIC Left 05/18/15    Thumb. IP joint MUCOUS CYST REMOVED. Dr. Nirmala Kumar.  HX ORTHOPAEDIC Right 05/18/15    Thumb. Dr. Michelle Obrien.     HX TONSILLECTOMY  1962       FAMILY HISTORY:    Family History   Problem Relation Age of Onset    Cancer Mother      Squamous Cell Anal CA with mets (including skin)    High Cholesterol Mother     Osteoporosis Mother     Colon Cancer Father     Hypertension Father     Cancer Maternal Grandmother      cervical    Stroke Maternal Grandmother      TIAs    Other Maternal Grandmother      multiple blood clots    Osteoporosis Maternal Grandmother     Arthritis-osteo Maternal Grandmother     Dementia Maternal Grandmother      Alzheimers    Diabetes Paternal Grandfather        SOCIAL HISTORY:    Social History     Social History    Marital status: SINGLE     Spouse name: N/A    Number of children: N/A    Years of education: N/A     Social History Main Topics    Smoking status: Passive Smoke Exposure - Never Smoker     Years: 1.00    Smokeless tobacco: Never Used      Comment: grew up with smoker parents x 20 yrs    Alcohol use Yes      Comment: one beer every few months    Drug use: No    Sexual activity: No     Other Topics Concern    None     Social History Narrative       IMMUNIZATIONS:    Immunization History   Administered Date(s) Administered    Hep A Vaccine (Adult) 03/05/2014    Hepatitis B Vaccine 10/14/2010, 11/14/2010, 04/29/2011    Influenza Vaccine 10/25/2016    Influenza Vaccine (Quad) 01/15/2016    Influenza Vaccine Split 11/22/2011, 10/09/2012    Influenza Vaccine Whole 10/14/2010    TD Vaccine 10/01/1999    TDAP Vaccine 10/18/2010    ZZZ-RETIRED (DO NOT USE) Pneumococcal Vaccine (Unspecified Type) 12/01/1998         PHYSICAL EXAMINATION    Vital Signs    Visit Vitals    /76    Pulse 73    Temp 98.8 °F (37.1 °C) (Oral)    Resp 18    Ht 5' 7\" (1.702 m)    Wt 212 lb 11.2 oz (96.5 kg)    SpO2 98%    BMI 33.31 kg/m2       Weight Metrics 2/22/2018 8/22/2017 2/21/2017 1/26/2016 1/15/2016 5/18/2015 3/5/2015   Weight 212 lb 11.2 oz 205 lb 14.4 oz 205 lb 3.2 oz 212 lb 8.4 oz 214 lb 6.4 oz 211 lb 6 oz 214 lb 12.8 oz   BMI 33.31 kg/m2 32.25 kg/m2 32.14 kg/m2 33.28 kg/m2 35.68 kg/m2 35.17 kg/m2 36.85 kg/m2       General appearance - Well nourished. Well appearing. Well developed. No acute distress. Obese. Head - Normocephalic. Atraumatic. Eyes - pupils equal and reactive. Extraocular eye movements intact. Sclera anicteric. Mildly injected sclera. Ears - Hearing is grossly normal bilaterally. Nose - normal and patent. No polyps noted. No erythema. No discharge. Mouth - mucous membranes with adequate moisture. Posterior pharynx normal with cobblestone appearance. No erythema, white exudate or obstruction. Neck - supple. Midline trachea. No carotid bruits noted bilaterally.   No thyromegaly noted.  Chest - clear to auscultation bilaterally anteriorly and posteriorly. No wheezes. No rales or rhonchi. Breath sounds are symmetrical bilaterally. Unlabored respirations. Heart - normal rate. Regular rhythm. Normal S1, S2. No murmur noted. No rubs, clicks or gallops noted. Abdomen - soft and distended. No masses or organomegaly. No rebound, rigidity or guarding. Bowel sounds normal x 4 quadrants. No tenderness noted. Neurological - awake, alert and oriented to person, place, and time and event. Cranial nerves II through XII intact. Clear speech. Muscle strength is +5/5 x 4 extremities. Sensation is intact to light touch bilaterally. Steady gait. Heme/Lymph - peripheral pulses normal x 4 extremities. No peripheral edema is noted. Musculoskeletal - Intact x 4 extremities. Full ROM x 4 extremities. No pain with movement. Back exam - normal range of motion. No pain on palpation of the spinous processes in the cervical, thoracic, lumbar, sacral regions. No CVA tenderness. Skin - no rashes, erythema, ecchymosis, lacerations, abrasions, suspicious moles noted  Psychological -   normal behavior, dress and thought processes. Good insight. Good eye contact. Normal affect. Appropriate mood. Normal speech.       DATA REVIEWED    Lab Results   Component Value Date/Time    WBC 5.7 03/24/2017 08:38 AM    Hemoglobin (POC) 11.3 (A) 02/12/2013 03:40 PM    HGB 14.1 03/24/2017 08:38 AM    HCT 42.3 03/24/2017 08:38 AM    PLATELET 235 63/77/3082 08:38 AM    MCV 86 03/24/2017 08:38 AM     Lab Results   Component Value Date/Time    Sodium 146 (H) 03/24/2017 08:38 AM    Potassium 4.4 03/24/2017 08:38 AM    Chloride 103 03/24/2017 08:38 AM    CO2 23 03/24/2017 08:38 AM    Anion gap 10 01/26/2016 06:16 PM    Glucose 89 03/24/2017 08:38 AM    BUN 12 03/24/2017 08:38 AM    Creatinine 0.89 03/24/2017 08:38 AM    BUN/Creatinine ratio 13 03/24/2017 08:38 AM    GFR est AA 81 03/24/2017 08:38 AM    GFR est non-AA 71 03/24/2017 08:38 AM    Calcium 9.6 03/24/2017 08:38 AM    Bilirubin, total 0.5 03/24/2017 08:38 AM    AST (SGOT) 19 03/24/2017 08:38 AM    Alk. phosphatase 75 03/24/2017 08:38 AM    Protein, total 6.8 03/24/2017 08:38 AM    Albumin 4.5 03/24/2017 08:38 AM    Globulin 3.8 01/26/2016 06:16 PM    A-G Ratio 2.0 03/24/2017 08:38 AM    ALT (SGPT) 19 03/24/2017 08:38 AM     Lab Results   Component Value Date/Time    Cholesterol, total 223 (H) 02/15/2018 08:16 AM    HDL Cholesterol 46 02/15/2018 08:16 AM    LDL, calculated 134 (H) 02/15/2018 08:16 AM    VLDL, calculated 43 (H) 02/15/2018 08:16 AM    Triglyceride 213 (H) 02/15/2018 08:16 AM    CHOL/HDL Ratio 7.3 (H) 02/04/2010 11:40 AM     Lab Results   Component Value Date/Time    Vitamin D 25-Hydroxy 43.8 06/21/2011 08:00 AM    VITAMIN D, 25-HYDROXY 45.7 03/24/2017 08:38 AM       Lab Results   Component Value Date/Time    Hemoglobin A1c 5.8 (H) 03/24/2017 08:38 AM     Lab Results   Component Value Date/Time    TSH 0.450 02/15/2018 08:16 AM    T4, Free 1.47 02/15/2018 08:16 AM       ASSESSMENT and PLAN      ICD-10-CM ICD-9-CM    1. Dyslipidemia E78.5 272.4 LIPID PANEL      METABOLIC PANEL, COMPREHENSIVE   2. Hypothyroidism due to acquired atrophy of thyroid E03.4 244.8 T4, FREE     246.8 TSH 3RD GENERATION   3. Hyperglycemia R73.9 790.29 HEMOGLOBIN A1C WITH EAG      METABOLIC PANEL, COMPREHENSIVE   4. Vitamin D deficiency E55.9 268.9 VITAMIN D, 25 HYDROXY   5. Family history of colon cancer Z80.0 V16.0 REFERRAL TO COLON AND RECTAL SURGERY    biological father   6. Colon cancer screening Z12.11 V76.51 REFERRAL TO COLON AND RECTAL SURGERY   7. Breast cancer screening Z12.31 V76.10 SURESH MAMMO BI SCREENING INCL CAD   8. Weight gain R63.5 783.1     7# since 08/2017    9. Granulomatous lung disease ([facility] J84.10 518.89    10. Family history of squamous cell carcinoma Z80.9 V16.9    11. Family history of blood clots Z82.49 V18.3    12.  Family history of dementia Z81.8 V17.2    13. Family history of stroke Z82.3 V17.1    14. Family history of diabetes mellitus (DM) Z83.3 V18.0    15. Family history of carcinoma in situ of anal canal Z84.89 V19.8 REFERRAL TO COLON AND RECTAL SURGERY   16. Family history of osteoporosis in mother Z80.64 V13.80    16. Other eczema L30.8 692.9    18. History of total left hip replacement Z96.642 V43.64    19. Family history of cervical cancer Z80.49 V16.49    20. Major depression in complete remission (HCC) F32.5 296.26    21. Obesity, Class I, BMI 30-34.9 E66.9 278.00    22. Family history of osteoporosis Z82.62 V17.81    23. Chronic seasonal allergic rhinitis due to other allergen J30.2 477.8     worse since raking wet leaves       Discussed the patient's BMI with her. The BMI follow up plan is as follows: I have counseled this patient on diet and exercise regimens. Decrease carbohydrates (white foods, sweet foods, sweet drinks and alcohol), increase green leafy vegetables and protein (lean meats and beans) with each meal.  Avoid fried foods. Eat 3-5 small meals daily. Do not skip meals. Increase water intake. Increase physical activity to 30 minutes daily for health benefit or 60 minutes daily to prevent weight regain, as tolerated. Get 7-8 hours uninterrupted sleep nightly. Chart reviewed and updated. Continue current medications and care. Start OTC Niacin 1,000 mg daily. Advised to take Aspirin 81 mg 30 mins prior due to potential flushing. She will follow up with her previous GYN office due to her family hx of cervical cancer. Most recent tests reviewed. Recheck pertinent labs when fasting 1 week prior to next visit. Mammogram ordered. Recent office visit notes from Dr. Ping Tapia reviewed. Get recent office visit notes from Dr. Corky Garcia. Advised pt to sign release. Referrals given; patient urged to keep appointments with specialists.  [facility]  Counseled patient on health concerns:  Thyroid, cholesterol, and weight management. Relevant handouts given and discussed with patient. Immunizations noted. Offered empathy, support, legitimation, prayers, partnership to patient. Praised patient for progress. Reminded pt to complete ACP and bring a copy to be scanned into EMR. Follow-up Disposition:  Return in about 6 months (around 8/22/2018) for weight, results, blood pressure, lipids. Patient was offered a choice/choices in the treatment plan today. Patient expresses understanding of the plan and agrees with recommendations. Written by mateusz Page, as dictated by Dr. Nina Rodriguez DO. Documentation True and Accepted by Madeline Leary. Marc Mares. Patient Instructions          Learning About Low-Carbohydrate Diets for Weight Loss  What is a low-carbohydrate diet? Low-carb diets avoid foods that are high in carbohydrate. These high-carb foods include pasta, bread, rice, cereal, fruits, and starchy vegetables. Instead, these diets usually have you eat foods that are high in fat and protein. Many people lose weight quickly on a low-carb diet. But the early weight loss is water. People on this diet often gain the weight back after they start eating carbs again. Not all diet plans are safe or work well. A lot of the evidence shows that low-carb diets aren't healthy. That's because these diets often don't include healthy foods like fruits and vegetables. Losing weight safely means balancing protein, fat, and carbs with every meal and snack. And low-carb diets don't always provide the vitamins, minerals, and fiber you need. If you have a serious medical condition, talk to your doctor before you try any diet. These conditions include kidney disease, heart disease, type 2 diabetes, high cholesterol, and high blood pressure. If you are pregnant, it may not be safe for your baby if you are on a low-carb diet. How can you lose weight safely?   You might have heard that a diet plan helped another person lose weight. But that doesn't mean that it will work for you. It is very hard to stay on a diet that includes lots of big changes in your eating habits. If you want to get to a healthy weight and stay there, making healthy lifestyle changes will often work better than dieting. These steps can help. · Make a plan for change. Work with your doctor to create a plan that is right for you. · See a dietitian. He or she can show you how to make healthy changes in your eating habits. · Manage stress. If you have a lot of stress in your life, it can be hard to focus on making healthy changes to your daily habits. · Track your food and activity. You are likely to do better at losing weight if you keep track of what you eat and what you do. Follow-up care is a key part of your treatment and safety. Be sure to make and go to all appointments, and call your doctor if you are having problems. It's also a good idea to know your test results and keep a list of the medicines you take. Where can you learn more? Go to http://bernardo-manju.info/. Enter A121 in the search box to learn more about \"Learning About Low-Carbohydrate Diets for Weight Loss. \"  Current as of: May 12, 2017  Content Version: 11.4  © 6811-5062 [employer]. Care instructions adapted under license by [employer] (which disclaims liability or warranty for this information). If you have questions about a medical condition or this instruction, always ask your healthcare professional. [place] [address] any warranty or liability for your use of this information. Starting a Weight Loss Plan: Care Instructions  Your Care Instructions    If you are thinking about losing weight, it can be hard to know where to start. Your doctor can help you set up a weight loss plan that best meets your needs. You may want to take a class on nutrition or exercise, or join a weight loss support group.  If you have questions about how to make changes to your eating or exercise habits, ask your doctor about seeing a registered dietitian or an exercise specialist.  It can be a big challenge to lose weight. But you do not have to make huge changes at once. Make small changes, and stick with them. When those changes become habit, add a few more changes. If you do not think you are ready to make changes right now, try to pick a date in the future. Make an appointment to see your doctor to discuss whether the time is right for you to start a plan. Follow-up care is a key part of your treatment and safety. Be sure to make and go to all appointments, and call your doctor if you are having problems. It's also a good idea to know your test results and keep a list of the medicines you take. How can you care for yourself at home? · Set realistic goals. Many people expect to lose much more weight than is likely. A weight loss of 5% to 10% of your body weight may be enough to improve your health. · Get family and friends involved to provide support. Talk to them about why you are trying to lose weight, and ask them to help. They can help by participating in exercise and having meals with you, even if they may be eating something different. · Find what works best for you. If you do not have time or do not like to cook, a program that offers meal replacement bars or shakes may be better for you. Or if you like to prepare meals, finding a plan that includes daily menus and recipes may be best.  · Ask your doctor about other health professionals who can help you achieve your weight loss goals. ¨ A dietitian can help you make healthy changes in your diet. ¨ An exercise specialist or  can help you develop a safe and effective exercise program.  ¨ A counselor or psychiatrist can help you cope with issues such as depression, anxiety, or family problems that can make it hard to focus on weight loss.   · Consider joining a support group for people who are trying to lose weight. Your doctor can suggest groups in your area. Where can you learn more? Go to http://bernardo-manju.info/. Enter N800 in the search box to learn more about \"Starting a Weight Loss Plan: Care Instructions. \"  Current as of: October 13, 2016  Content Version: 11.4  © 2801-9618 [employer]. Care instructions adapted under license by [employer] (which disclaims liability or warranty for this information). If you have questions about a medical condition or this instruction, always ask your healthcare professional. [place] [address] any warranty or liability for your use of this information. High Cholesterol: Care Instructions  Your Care Instructions    Cholesterol is a type of fat in your blood. It is needed for many body functions, such as making new cells. Cholesterol is made by your body. It also comes from food you eat. High cholesterol means that you have too much of the fat in your blood. This raises your risk of a heart attack and stroke. LDL and HDL are part of your total cholesterol. LDL is the \"bad\" cholesterol. High LDL can raise your risk for heart disease, heart attack, and stroke. HDL is the \"good\" cholesterol. It helps clear bad cholesterol from the body. High HDL is linked with a lower risk of heart disease, heart attack, and stroke. Your cholesterol levels help your doctor find out your risk for having a heart attack or stroke. You and your doctor can talk about whether you need to lower your risk and what treatment is best for you. A heart-healthy lifestyle along with medicines can help lower your cholesterol and your risk. The way you choose to lower your risk will depend on how high your risk is for heart attack and stroke. It will also depend on how you feel about taking medicines. Follow-up care is a key part of your treatment and safety.  Be sure to make and go to all appointments, and call your doctor if you are having problems. It's also a good idea to know your test results and keep a list of the medicines you take. How can you care for yourself at home? · Eat a variety of foods every day. Good choices include fruits, vegetables, whole grains (like oatmeal), dried beans and peas, nuts and seeds, soy products (like tofu), and fat-free or low-fat dairy products. · Replace butter, margarine, and hydrogenated or partially hydrogenated oils with olive and canola oils. (Canola oil margarine without trans fat is fine.)  · Replace red meat with fish, poultry, and soy protein (like tofu). · Limit processed and packaged foods like chips, crackers, and cookies. · Bake, broil, or steam foods. Don't davalos them. · Be physically active. Get at least 30 minutes of exercise on most days of the week. Walking is a good choice. You also may want to do other activities, such as running, swimming, cycling, or playing tennis or team sports. · Stay at a healthy weight or lose weight by making the changes in eating and physical activity listed above. Losing just a small amount of weight, even 5 to 10 pounds, can reduce your risk for having a heart attack or stroke. · Do not smoke. When should you call for help? Watch closely for changes in your health, and be sure to contact your doctor if:  ? · You need help making lifestyle changes. ? · You have questions about your medicine. Where can you learn more? Go to http://bernardo-manju.info/. Enter G058 in the search box to learn more about \"High Cholesterol: Care Instructions. \"  Current as of: September 21, 2016  Content Version: 11.4  © 2896-3431 [employer]. Care instructions adapted under license by [employer] (which disclaims liability or warranty for this information).  If you have questions about a medical condition or this instruction, always ask your healthcare professional. Aminta Dye [facility] disclaims any warranty or liability for your use of this information. Preventing a Relapse of Depression: Care Instructions  Your Care Instructions    A relapse of depression means your symptoms have come back after you have gotten better. This illness often comes and goes during a lifetime. But there are many things you can do to keep it from coming back. Follow-up care is a key part of your treatment and safety. Be sure to make and go to all appointments, and call your doctor if you are having problems. It's also a good idea to know your test results and keep a list of the medicines you take. What do you need to know? Know your risk of relapse  Talk to your doctor to find out if you are at risk of relapse. Many things can make a person more likely to relapse into depression. These include having a family member with depression, dealing with serious problems in a relationship or a job, having a serious medical condition, or abusing drugs or alcohol. It is important to know your risk and to recognize warning signs of relapse. Once you know these things, you will be better able to keep it from happening to you. Know the warning signs of relapse  The two most common signs of relapse are:  · Feeling sad or hopeless. · Losing interest in your daily activities. You may have other symptoms, such as:  · You lose or gain weight. · You sleep too much or not enough. · You feel restless and unable to sit still. · You feel unable to move. · You feel tired all the time. · You feel unworthy or guilty without an obvious reason. · You have problems concentrating, remembering, or making decisions. · You think often about death or suicide. · You feel angry or have panic attacks. How can you care for yourself at home? · Take your medicine as prescribed. Call your doctor if you have any problems with your medicine. Many people take their medicines for at least 6 months after they have recovered.  This often helps keep symptoms from coming back. However, if your depression keeps coming back, you may have to take medicine for the rest of your life. · Continue counseling even after you have stopped taking medicine. · Eat healthy foods. Include fruits, vegetables, beans, and whole grains in your diet each day. · Get at least 30 minutes of exercise on most days of the week. Walking is a good choice. You also may want to do other activities, such as running, swimming, cycling, or playing tennis or team sports. · See your doctor right away if you have new symptoms or feel that your depression is coming back. · Keep a regular sleep schedule. Try for 8 hours of sleep a night. · Avoid alcohol and illegal drugs. · Keep the numbers for these national suicide hotlines: 6-659-574-TALK (9-710.548.5098) and 7-131-BFAZCNZ (1-673.949.2370). If you or someone you know talks about suicide or feeling hopeless, get help right away. When should you call for help? Call 911 anytime you think you may need emergency care. For example, call if:  ? · You are thinking about suicide or are threatening suicide. ? · You feel you cannot stop from hurting yourself or someone else. ? · You hear or see things that aren't real.   ? · You think or speak in a bizarre way that is not like your usual behavior. ?Call your doctor now or seek immediate medical care if:  ? · You are drinking a lot of alcohol or using illegal drugs. ? · You are talking or writing about death. ? Watch closely for changes in your health, and be sure to contact your doctor if:  ? · You find it hard or it's getting harder to deal with school, a job, family, or friends. ? · You think your treatment is not helping or you are not getting better. ? · Your symptoms get worse or you get new symptoms. ? · You have any problems with your antidepressant medicines, such as side effects, or you are thinking about stopping your medicine.    ? · You are having manic behavior, such as having very high energy, needing less sleep than normal, or showing risky behavior such as spending money you don't have or abusing others verbally or physically. Where can you learn more? Go to http://bernardo-manju.info/. Enter S868 in the search box to learn more about \"Preventing a Relapse of Depression: Care Instructions. \"  Current as of: May 12, 2017  Content Version: 11.4  © 9469-8647 [employer], [employer]. Care instructions adapted under license by [facility] (which disclaims liability or warranty for this information). If you have questions about a medical condition or this instruction, always ask your healthcare professional. [place] [address] any warranty or liability for your use of this information.

## 2018-05-11 ENCOUNTER — TELEPHONE (OUTPATIENT)
Dept: FAMILY MEDICINE CLINIC | Age: 63
End: 2018-05-11

## 2018-05-11 NOTE — TELEPHONE ENCOUNTER
----- Message from Madison Hou sent at 5/11/2018  3:42 PM EDT -----  Regarding: Dr. Danis Diop stating she is supposed to get 1 years worth of all of her prescriptions and she hasn't. Would like a call [phone].

## 2018-05-15 ENCOUNTER — HOSPITAL ENCOUNTER (OUTPATIENT)
Dept: MAMMOGRAPHY | Age: 63
Discharge: HOME | End: 2018-05-15
Attending: FAMILY MEDICINE
Payer: COMMERCIAL

## 2018-05-15 DIAGNOSIS — Z12.39 BREAST SCREENING: ICD-10-CM

## 2018-05-15 PROCEDURE — 77067 SCR MAMMO BI INCL CAD: CPT

## 2018-08-21 ENCOUNTER — OFFICE VISIT (OUTPATIENT)
Dept: FAMILY MEDICINE CLINIC | Age: 63
End: 2018-08-21

## 2018-08-21 VITALS
HEIGHT: 65 IN | WEIGHT: 210.2 LBS | HEART RATE: 67 BPM | TEMPERATURE: 98.4 F | RESPIRATION RATE: 16 BRPM | BODY MASS INDEX: 35.02 KG/M2 | DIASTOLIC BLOOD PRESSURE: 78 MMHG | SYSTOLIC BLOOD PRESSURE: 136 MMHG | OXYGEN SATURATION: 96 %

## 2018-08-21 DIAGNOSIS — R07.89 CHEST TIGHTNESS: ICD-10-CM

## 2018-08-21 DIAGNOSIS — Z12.11 COLON CANCER SCREENING: ICD-10-CM

## 2018-08-21 DIAGNOSIS — E03.4 HYPOTHYROIDISM DUE TO ACQUIRED ATROPHY OF THYROID: Primary | ICD-10-CM

## 2018-08-21 DIAGNOSIS — J00 ACUTE NASOPHARYNGITIS: ICD-10-CM

## 2018-08-21 DIAGNOSIS — J84.10 GRANULOMATOUS LUNG DISEASE (HCC): ICD-10-CM

## 2018-08-21 DIAGNOSIS — J30.9 ALLERGIC CONJUNCTIVITIS AND RHINITIS, BILATERAL: ICD-10-CM

## 2018-08-21 DIAGNOSIS — E55.9 VITAMIN D DEFICIENCY: ICD-10-CM

## 2018-08-21 DIAGNOSIS — J45.21 MILD INTERMITTENT ASTHMA WITH ACUTE EXACERBATION: ICD-10-CM

## 2018-08-21 DIAGNOSIS — E66.9 OBESITY, CLASS I, BMI 30-34.9: ICD-10-CM

## 2018-08-21 DIAGNOSIS — E78.5 DYSLIPIDEMIA: ICD-10-CM

## 2018-08-21 DIAGNOSIS — H10.13 ALLERGIC CONJUNCTIVITIS AND RHINITIS, BILATERAL: ICD-10-CM

## 2018-08-21 DIAGNOSIS — Z80.0 FAMILY HISTORY OF COLON CANCER: ICD-10-CM

## 2018-08-21 DIAGNOSIS — R73.9 HYPERGLYCEMIA: ICD-10-CM

## 2018-08-21 DIAGNOSIS — F43.23 ADJUSTMENT DISORDER WITH MIXED ANXIETY AND DEPRESSED MOOD: ICD-10-CM

## 2018-08-21 DIAGNOSIS — Z84.89 FAMILY HISTORY OF CARCINOMA IN SITU OF ANAL CANAL: ICD-10-CM

## 2018-08-21 RX ORDER — TIOTROPIUM BROMIDE INHALATION SPRAY 1.56 UG/1
SPRAY, METERED RESPIRATORY (INHALATION)
COMMUNITY
Start: 2018-06-01 | End: 2019-07-02 | Stop reason: ALTCHOICE

## 2018-08-21 NOTE — MR AVS SNAPSHOT
[address] 
[address] [place] [address] 
[phone] Patient: Alma Rosa Stanley MRN:  [id number] Visit Information Date & Time Provider Department Dept. Phone Encounter #  
 8/21/2018 10:00 AM Sandra Loza DO [facility] [phone] [id number] Follow-up Instructions Return in about 6 months (around 2/21/2019) for blood pressure, results, lipids. Upcoming Health Maintenance Date Due Influenza Age 5 to Adult 9/1/2018* ZOSTER VACCINE AGE 60> 12/21/2018* COLONOSCOPY 12/31/2018* PAP AKA CERVICAL CYTOLOGY 1/19/2019 BREAST CANCER SCRN MAMMOGRAM 5/15/2019 DTaP/Tdap/Td series (2 - Td) 10/18/2020 *Topic was postponed. The date shown is not the original due date. Allergies as of 8/21/2018  Review Complete On: 8/21/2018 By: Wilfred Fong Severity Noted Reaction Type Reactions Aspirin Medium 02/04/2010   Intolerance Other (comments) Triggers asthma & causes chest tightness but takes 325 mg \"once in a while\" Lipitor [Atorvastatin]  04/11/2008    Myalgia Elevated CPK Sulfa (Sulfonamide Antibiotics)  01/12/2015   Side Effect Rash Erythromycin Low 02/04/2010   Side Effect Nausea and Vomiting Penicillins Low 02/04/2010   Side Effect Nausea and Vomiting Many years ago Current Immunizations  Reviewed on 8/21/2018 Name Date Hep A Vaccine (Adult) 3/5/2014 Hepatitis B Vaccine 4/29/2011, 11/14/2010, 10/14/2010 Influenza Vaccine 10/25/2016 Influenza Vaccine Janyth Duarte) 1/15/2016 Influenza Vaccine Split 10/9/2012, 11/22/2011 Influenza Vaccine Whole 10/14/2010 TD Vaccine 10/1/1999 TDAP Vaccine 10/18/2010 ZZZ-RETIRED (DO NOT USE) Pneumococcal Vaccine (Unspecified Type) 12/1/1998 Reviewed by Sandra Loza DO on 8/21/2018 at 11:29 AM  
You Were Diagnosed With   
  
 Codes Comments Hypothyroidism due to acquired atrophy of thyroid    -  Primary ICD-10-CM: E03.4 ICD-9-CM: 244.8, 246.8 Mild intermittent asthma with acute exacerbation     ICD-10-CM: J45.21 ICD-9-CM: 486.29 Dyslipidemia     ICD-10-CM: E78.5 ICD-9-CM: 272.4 Vitamin D deficiency     ICD-10-CM: E55.9 ICD-9-CM: 268.9 Acute nasopharyngitis     ICD-10-CM: Lesle Breed ICD-9-CM: 193 Adjustment disorder with mixed anxiety and depressed mood     ICD-10-CM: F43.23 
ICD-9-CM: 309.28 due to multiple deaths in family, stable with Zoloft 25 mg daily Hyperglycemia     ICD-10-CM: R73.9 ICD-9-CM: 790.29 Allergic conjunctivitis and rhinitis, bilateral     ICD-10-CM: H10.13, J30.9 ICD-9-CM: 372.05, 477.9 Chest tightness     ICD-10-CM: R07.89 ICD-9-CM: 786.59 Family history of colon cancer     ICD-10-CM: Z80.0 ICD-9-CM: V16.0 Family history of carcinoma in situ of anal canal     ICD-10-CM: Z84.89 ICD-9-CM: V19.8 Colon cancer screening     ICD-10-CM: Z12.11 ICD-9-CM: V76.51 Gran[facility] lung disease ([facility]     ICD-10-CM: J84.10 ICD-9-CM: 518.89 Obesity, Class I, BMI 30-34.9     ICD-10-CM: E66.9 ICD-9-CM: 278.00 Vitals BP Pulse Temp Resp Height(growth percentile) Weight(growth percentile) 136/78 (BP 1 Location: Right arm, BP Patient Position: Sitting) 67 98.4 °F (36.9 °C) (Oral) 16 5' 4.76\" (1.645 m) 210 lb 3.2 oz (95.3 kg) SpO2 BMI OB Status Smoking Status 96% 35.23 kg/m2 Postmenopausal Passive Smoke Exposure - Never Smoker Vitals History BMI and BSA Data Body Mass Index Body Surface Area  
 35.23 kg/m 2 2.09 m 2 Preferred Pharmacy Pharmacy Name Phone [address], [address] [place] [address] [phone] Your Updated Medication List  
  
   
This list is accurate as of 8/21/18 11:33 AM.  Always use your most recent med list.  
  
  
  
  
 ADVAIR DISKUS 500-50 mcg/dose diskus inhaler Generic drug:  fluticasone-salmeterol  
1 PUFF TWICE A DAY  
  
 albuterol-ipratropium 2.5 mg-0.5 mg/3 ml Nebu Commonly known as:  DUO-NEB  
3 mL by Nebulization route every six (6) hours. Azelastine 0.15 % (205.5 mcg) nasal spray Commonly known as:  ASTEPRO  
  
 cholecalciferol (VITAMIN D3) 5,000 unit Tab tablet Commonly known as:  VITAMIN D3 Take  by mouth daily. levothyroxine 100 mcg tablet Commonly known as:  SYNTHROID  
TAKE 1 TABLET DAILY BEFORE BREAKFAST. TAKE 2 ON SUNDAYS  
  
 montelukast 10 mg tablet Commonly known as:  SINGULAIR  
TAKE 1 TABLET BY MOUTH DAILY FOR ALLERGIES AND ASTHMA.  
  
 multivitamin tablet Commonly known as:  ONE A DAY Take 1 Tab by mouth daily. Nebulizer & Compressor machine 1 each by Does Not Apply route daily. pravastatin 20 mg tablet Commonly known as:  PRAVACHOL  
TAKE 1 TABLET BY MOUTH EVERY EVENING  
  
 PROAIR HFA 90 mcg/actuation inhaler Generic drug:  albuterol INHALE 1-2 PUFFS EVERY 4-6 HOURS AS NEEDED  
  
 sertraline 50 mg tablet Commonly known as:  ZOLOFT  
TAKE 1 TABLET BY MOUTH DAILY. SPIRIVA RESPIMAT 1.25 mcg/actuation inhaler Generic drug:  tiotropium bromide  
  
 triamcinolone 55 mcg nasal inhaler Commonly known as:  NASACORT AQ Use 2 sprays in each nostril everyday We Performed the Following ALBUTEROL, INHAL. SOL., FDA-APPROVED FINAL, NON-COMPOUND UNIT DOSE, 1 MG [ [facility]] REFERRAL TO COLON AND RECTAL SURGERY [REF17 Custom] Comments:  
 fam his colon and anal CA, screening Follow-up Instructions Return in about 6 months (around 2/21/2019) for blood pressure, results, lipids. Referral Information Referral ID Referred By Referred To  
  
 [id number] [facility]   
   [address] [place],, [address] Visits Status Start Date End Date 1 New Request 8/21/18 8/21/19 If your referral has a status of pending review or denied, additional information will be sent to support the outcome of this decision. Introducing [facility] SERVICES! Dear Sophia Dash: 
Thank you for requesting a [employer] account. Our records indicate that you already have an active [employer] account. You can access your account anytime at [email or web address] Did you know that you can access your hospital and ER discharge instructions at any time in [employer]? You can also review all of your test results from your hospital stay or ER visit. Additional Information If you have questions, please visit the Frequently Asked Questions section of the [employer] website at [email or web address] Remember, [employer] is NOT to be used for urgent needs. For medical emergencies, dial 911. Now available from your iPhone and Android! Please provide this summary of care documentation to your next provider. Your primary care clinician is listed as Ki Loco. If you have any questions after today's visit, please call [phone].

## 2018-08-21 NOTE — PROGRESS NOTES
HISTORY OF PRESENT ILLNESS  Elenita Gerber is a 59 y.o. female presents with Weight Management; Blood Pressure Check; Results; Cholesterol Problem; Cough; Nasal Discharge; and Shortness of Breath (chest tightness; has been using inhaler and nebulizer machine)    Agree with nurse note. Pt with dyslipidemia, vit d deficiency, and hyperglycemia  presents to the office with a BP of 136/78. She is taking Synthroid 100 mcg daily with 2 tabs on Sundays; denies sxs of hyperthyroid. She is taking Pravachol 20 mg daily, tolerating well. Previously intolerant of Lipitor. 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 She reports she went to [facility] and they gave her inserts for her shoes. Pt with allergies, mild intermittent asthma and granulomas lung disease complains of chest tightness and buring, dry cough, PND with clear mucus, fatigue, itchy nose, clogged ears, itchy and watery eyes x1 week. She notes everyone in her house is sick. She thinks she had a fever last Thursday. Denies HA, facial pain, nausea, blood in urine or stool, and burning, itching or pain with urination. She last her pulmonologist, Dr. Nikolai Brian on 2/13/2018 for f/u of asthma. She uses Ventolin inhaler as needed (less than twice weekly), Advair daily, Singular qhs, and astepro nasal spray daily. She also uses duo-neb as needed at home. She had previously been on Spiriva but stopped when sxs were not acting up. She felt chest tighntness decreased after neb treatment in office today. Pt with adjustment disorder with mixed anxiety and depressed mood. She uses Zoloft 50 mg daily, but does not feel she needs it any longer as it was mainly needed while she was . Denies SI/HI. Health Maintenance    Pt with family hx of colon cancer and anal cancer. Pt's most recent colonoscopy was on 4/7/2008 with Dr. Jerrica Triplett. F/U 5 years. She is agreeable with a referral today.      Written by mateusz Lin, as dictated by Dr. Christa Gowers, DO. ROS    Review of Systems negative except as noted above in HPI. ALLERGIES:    Allergies   Allergen Reactions    Aspirin Other (comments)     Triggers asthma & causes chest tightness but takes 325 mg \"once in a while\"    Lipitor [Atorvastatin] Myalgia     Elevated CPK    Sulfa (Sulfonamide Antibiotics) Rash    Erythromycin Nausea and Vomiting    Penicillins Nausea and Vomiting     Many years ago       CURRENT MEDICATIONS:      PAST MEDICAL HISTORY:    Past Medical History:   Diagnosis Date    Adjustment disorder 08/2012    Dr. Opal Zarate. Roula Jha, counselor    Allergic rhinitis 2016    Dr. [facility], unspecified 01/25/07    Asthma 1980    Dr. Roche, pul. [facility] bertha inhalation.  Chickenpox childhood    Cutaneous wart 05/2015    Right thumb. [facility]. Dr. Lanny Merrill.  Depression     Digital mucous cyst 05/2015    Left IP joint. Dr. Carolee Kumar.  Diverticulosis     Dr. Tamra Crocker    DJD (degenerative joint disease) of hip 09/19/12    Dr. Yessy Segovia    Eczema 2016    Dr. Jovan Benson liver 2006    Granulomatous lung disease ([address] 2017    stable. Dr. Carla Burgos.  Hearing aid worn 09/2014    Bilateral ears.  Hematuria 13/3949    Hepatitis B immune March 2014    Prior vaccine--[id number].  High cholesterol 1976    Hypothyroid 10/16/2006    Knee pain     having surgery on 2/12/13 left    Left hip pain 2013    Dr. Nena Pelayo Measles childhood    Menopause 11/2006    Mumps childhood    Ovarian cyst 02/21/2008    Left and Rt . Mrs. Berman Bronk    Pneumonia 1981    x 2    PONV (postoperative nausea and vomiting)     Scarlet fever 1971    Uterine fibroid 02/21/08    Colleen Moreno       PAST SURGICAL HISTORY:    Past Surgical History:   Procedure Laterality Date    HX COLONOSCOPY  04/07/08    Dr. Tamra Crocker. due q 5 yrs.     HX HIP REPLACEMENT Left 02/12/13    Left.  with Anterior approach. Dr. Tara Torres.  HX ORTHOPAEDIC Left 05/18/15    Thumb. IP joint MUCOUS CYST REMOVED. Dr. Ml Nation.  HX ORTHOPAEDIC Right 05/18/2015    Thumb. Dr. Paramjit Sandhu.     HX TONSILLECTOMY  1962       FAMILY HISTORY:    Family History   Problem Relation Age of Onset    Cancer Mother      Squamous Cell Anal CA with mets (including skin)    High Cholesterol Mother     Osteoporosis Mother     Colon Cancer Father     Hypertension Father     Cancer Maternal Grandmother      cervical    Stroke Maternal Grandmother      TIAs    Other Maternal Grandmother      multiple blood clots    Osteoporosis Maternal Grandmother     Arthritis-osteo Maternal Grandmother     Dementia Maternal Grandmother      Alzheimers    Diabetes Paternal Grandfather        SOCIAL HISTORY:    Social History     Social History    Marital status:      Spouse name: N/A    Number of children: N/A    Years of education: N/A     Social History Main Topics    Smoking status: Passive Smoke Exposure - Never Smoker     Years: 1.00    Smokeless tobacco: Never Used      Comment: grew up with smoker parents x 20 yrs    Alcohol use Yes      Comment: one beer every few months    Drug use: No    Sexual activity: No     Other Topics Concern    None     Social History Narrative       IMMUNIZATIONS:    Immunization History   Administered Date(s) Administered    Hep A Vaccine (Adult) 03/05/2014    Hepatitis B Vaccine 10/14/2010, 11/14/2010, 04/29/2011    Influenza Vaccine 10/25/2016    Influenza Vaccine (Quad) 01/15/2016    Influenza Vaccine Split 11/22/2011, 10/09/2012    Influenza Vaccine Whole 10/14/2010    TD Vaccine 10/01/1999    TDAP Vaccine 10/18/2010    ZZZ-RETIRED (DO NOT USE) Pneumococcal Vaccine (Unspecified Type) 12/01/1998         PHYSICAL EXAMINATION    Vital Signs    Visit Vitals    /78 (BP 1 Location: Right arm, BP Patient Position: Sitting)    Pulse 67    Temp 98.4 °F (36.9 °C) (Oral)    Resp 16    Ht 5' 4.76\" (1.645 m)    Wt 210 lb 3.2 oz (95.3 kg)    SpO2 96%    BMI 35.23 kg/m2       Weight Metrics 8/21/2018 2/22/2018 8/22/2017 2/21/2017 1/26/2016 1/15/2016 5/18/2015   Weight 210 lb 3.2 oz 212 lb 11.2 oz 205 lb 14.4 oz 205 lb 3.2 oz 212 lb 8.4 oz 214 lb 6.4 oz 211 lb 6 oz   BMI 35.23 kg/m2 33.31 kg/m2 32.25 kg/m2 32.14 kg/m2 33.28 kg/m2 35.68 kg/m2 35.17 kg/m2       General appearance - Well nourished. Well appearing. Well developed. No acute distress. Obese. Head - Normocephalic. Atraumatic. Eyes - pupils equal and reactive. Extraocular eye movements intact. Sclera anicteric. Mildly injected sclera. Ears - Hearing is grossly normal bilaterally. Nose - normal and patent. No polyps noted. No erythema. No discharge. Mouth - mucous membranes with adequate moisture. Posterior pharynx normal with cobblestone appearance. No erythema, white exudate or obstruction. Neck - supple. Midline trachea. No carotid bruits noted bilaterally. No thyromegaly noted. Chest - clear to auscultation bilaterally anteriorly and posteriorly. No wheezes. No rales or rhonchi. Breath sounds are symmetrical bilaterally. Unlabored respirations. Occasional deep dry cough. Heart - normal rate. Regular rhythm. Normal S1, S2. No murmur noted. No rubs, clicks or gallops noted. Abdomen - soft and distended. No masses or organomegaly. No rebound, rigidity or guarding. Bowel sounds normal x 4 quadrants. No tenderness noted. Neurological - awake, alert and oriented to person, place, and time and event. Cranial nerves II through XII intact. Clear speech. Muscle strength is +5/5 x 4 extremities. Sensation is intact to light touch bilaterally. Steady gait. Heme/Lymph - peripheral pulses normal x 4 extremities. No peripheral edema is noted. Musculoskeletal - Intact x 4 extremities. Full ROM x 4 extremities. No pain with movement. Back exam - normal range of motion. No pain on palpation of the spinous processes in the cervical, thoracic, lumbar, sacral regions. No CVA tenderness. Skin - no rashes, erythema, ecchymosis, lacerations, abrasions, suspicious moles noted  Psychological -   normal behavior, dress and thought processes. Good insight. Good eye contact. Normal affect. Appropriate mood. Normal speech. DATA REVIEWED    Lab Results   Component Value Date/Time    WBC 5.7 03/24/2017 08:38 AM    Hemoglobin (POC) 11.3 (A) 02/12/2013 03:40 PM    HGB 14.1 03/24/2017 08:38 AM    HCT 42.3 03/24/2017 08:38 AM    PLATELET 505 95/78/6443 08:38 AM    MCV 86 03/24/2017 08:38 AM     Lab Results   Component Value Date/Time    Sodium 146 (H) 03/24/2017 08:38 AM    Potassium 4.4 03/24/2017 08:38 AM    Chloride 103 03/24/2017 08:38 AM    CO2 23 03/24/2017 08:38 AM    Anion gap 10 01/26/2016 06:16 PM    Glucose 89 03/24/2017 08:38 AM    BUN 12 03/24/2017 08:38 AM    Creatinine 0.89 03/24/2017 08:38 AM    BUN/Creatinine ratio 13 03/24/2017 08:38 AM    GFR est AA 81 03/24/2017 08:38 AM    GFR est non-AA 71 03/24/2017 08:38 AM    Calcium 9.6 03/24/2017 08:38 AM    Bilirubin, total 0.5 03/24/2017 08:38 AM    AST (SGOT) 19 03/24/2017 08:38 AM    Alk.  phosphatase 75 03/24/2017 08:38 AM    Protein, total 6.8 03/24/2017 08:38 AM    Albumin 4.5 03/24/2017 08:38 AM    Globulin 3.8 01/26/2016 06:16 PM    A-G Ratio 2.0 03/24/2017 08:38 AM    ALT (SGPT) 19 03/24/2017 08:38 AM     Lab Results   Component Value Date/Time    Cholesterol, total 223 (H) 02/15/2018 08:16 AM    HDL Cholesterol 46 02/15/2018 08:16 AM    LDL, calculated 134 (H) 02/15/2018 08:16 AM    VLDL, calculated 43 (H) 02/15/2018 08:16 AM    Triglyceride 213 (H) 02/15/2018 08:16 AM    CHOL/HDL Ratio 7.3 (H) 02/04/2010 11:40 AM     Lab Results   Component Value Date/Time    Vitamin D 25-Hydroxy 43.8 06/21/2011 08:00 AM    VITAMIN D, 25-HYDROXY 45.7 03/24/2017 08:38 AM       Lab Results Component Value Date/Time    Hemoglobin A1c 5.8 (H) 03/24/2017 08:38 AM     Lab Results   Component Value Date/Time    TSH 0.450 02/15/2018 08:16 AM         ASSESSMENT and PLAN      ICD-10-CM ICD-9-CM    1. Hypothyroidism due to acquired atrophy of thyroid E03.4 244.8      246.8    2. Mild intermittent asthma with acute exacerbation J45.21 493.92 SPIRIVA RESPIMAT 1.25 mcg/actuation inhaler   3. Dyslipidemia E78.5 272.4    4. Vitamin D deficiency E55.9 268.9    5. Acute nasopharyngitis J00 460    6. Adjustment disorder with mixed anxiety and depressed mood F43.23 309.28     due to multiple deaths in family, stable with Zoloft 25 mg daily   7. Hyperglycemia R73.9 790.29    8. Allergic conjunctivitis and rhinitis, bilateral H10.13 372.05     J30.9 477.9    9. Chest tightness R07.89 786.59 ALBUTEROL, INHAL. SOL., FDA-APPROVED FINAL, NON-COMPOUND UNIT DOSE, 1 MG   10. Family history of colon cancer Z80.0 V16.0 REFERRAL TO COLON AND RECTAL SURGERY   11. Family history of carcinoma in situ of anal canal Z84.89 V19.8 REFERRAL TO COLON AND RECTAL SURGERY   12. Colon cancer screening Z12.11 V76.51 REFERRAL TO COLON AND RECTAL SURGERY   13. [facility] J84.10 518.89    14. Obesity, Class I, BMI 30-34.9 E66.9 278.00        Chart reviewed and updated. Continue current medications and care. Restart Spiriva 1.25 mcg inhaler daily according to previous recommendations by pulmonologist. Jazmin Gates pt to use OTC eye drops for itchy and watery eyes. Recheck pertinent labs today. Get recent office visit notes from Dr. Brianna Lugo. Advised pt to sign release. Referrals given; patient urged to keep appointments with specialists. Counseled patient on health concerns:  Asthma, allergy hygiene, stressors, hyperglycemia, and cholesterol. Congestion protocol. Signs of MI/CVA. To ER if present. Relevant handouts given and discussed with patient. Immunizations noted;  Advised pt to discuss Shingrix vaccine with local pharmacy and insurance company. Offered empathy, support, legitimation, prayers, partnership to patient. Praised patient for progress. Pt has ACP handout at home. Follow-up Disposition:  Return in about 6 months (around 2/21/2019) for blood pressure, results, lipids. Patient was offered a choice/choices in the treatment plan today. Patient expresses understanding of the plan and agrees with recommendations. Patient declines any additional handouts. Patient is satisfied with previous handouts received from our office    Written by mateusz Corrigan, as dictated by Dr. Jaimee Hare DO. Documentation True and Accepted by Keniuabdoulaye Hernandez.  Josh Allen.

## 2018-08-21 NOTE — PROGRESS NOTES
Chun Holloway  Identified pt with two pt identifiers(name and ). Chief Complaint   Patient presents with    Weight Management    Blood Pressure Check    Results    Cholesterol Problem    Cough    Nasal Discharge    Shortness of Breath     chest tightness; has been using inhaler and nebulizer machine       1. Have you been to the ER, urgent care clinic since your last visit? Hospitalized since your last visit? No    2. Have you seen or consulted any other health care providers outside of the [address] [place] [address] since your last visit? Include any pap smears or colon screening. Yes, Timothy Cherry for feet    In the event something were to happen to you and you were unable to speak on your behalf, do you have an Advance Directive/ Living Will in place stating your wishes? NO    If yes, do we have a copy on file NO    If no, would you like information:          Medication reconciliation up to date and corrected with patient at this time. Today's provider has been notified of reason for visit, vitals and flowsheets obtained on patients. Reviewed record in preparation for visit, huddled with provider and have obtained necessary documentation.       Health Maintenance Due   Topic    ZOSTER VACCINE AGE 60>        Wt Readings from Last 3 Encounters:   18 210 lb 3.2 oz (95.3 kg)   18 212 lb 11.2 oz (96.5 kg)   17 205 lb 14.4 oz (93.4 kg)     Temp Readings from Last 3 Encounters:   18 98.8 °F (37.1 °C) (Oral)   17 98.7 °F (37.1 °C) (Oral)   17 99.1 °F (37.3 °C) (Oral)     BP Readings from Last 3 Encounters:   18 137/76   17 147/85   17 122/80     Pulse Readings from Last 3 Encounters:   18 73   17 65   17 67     Vitals:    18 1010   BP: 136/78   Pulse: 67   Resp: 16   Temp: 98.4 °F (36.9 °C)   TempSrc: Oral   SpO2: 96%   Weight: 210 lb 3.2 oz (95.3 kg)   Height: 5' 4.76\" (1.645 m)   PainSc:   0 - No pain         Learning Assessment:  :     Learning Assessment 1/27/2015   PRIMARY LEARNER Patient   HIGHEST LEVEL OF EDUCATION - PRIMARY LEARNER  > 4 YEARS OF COLLEGE   BARRIERS PRIMARY LEARNER HEARING   CO-LEARNER CAREGIVER No   PRIMARY LANGUAGE ENGLISH   LEARNER PREFERENCE PRIMARY DEMONSTRATION     VIDEOS     READING     LISTENING   ANSWERED BY patient   RELATIONSHIP SELF       Depression Screening:  :     PHQ over the last two weeks 8/21/2018   Little interest or pleasure in doing things Not at all   Feeling down, depressed, irritable, or hopeless Not at all   Total Score PHQ 2 0       Fall Risk Assessment:  :     Fall Risk Assessment, last 12 mths 8/21/2018   Able to walk? Yes   Fall in past 12 months?  No       ADL Screening:  :     ADL Assessment 8/21/2018   Feeding yourself No Help Needed   Getting from bed to chair No Help Needed   Getting dressed No Help Needed   Bathing or showering No Help Needed   Walk across the room (includes cane/walker) No Help Needed   Using the telphone No Help Needed   Taking your medications No Help Needed   Preparing meals No Help Needed   Managing money (expenses/bills) No Help Needed   Moderately strenuous housework (laundry) No Help Needed   Shopping for personal items (toiletries/medicines) No Help Needed   Shopping for groceries No Help Needed   Driving No Help Needed   Climbing a flight of stairs No Help Needed   Getting to places beyond walking distances No Help Needed

## 2018-08-21 NOTE — PROGRESS NOTES
Verbal Order Read Back Per Grace Melendrez DO for Duo-Neb breathing treatment.  Elenita Gerber verified correct name and  with PCP

## 2018-10-05 ENCOUNTER — HOSPITAL ENCOUNTER (OUTPATIENT)
Age: 63
Setting detail: OUTPATIENT SURGERY
Discharge: HOME | End: 2018-10-05
Attending: COLON & RECTAL SURGERY | Admitting: COLON & RECTAL SURGERY
Payer: COMMERCIAL

## 2018-10-05 ENCOUNTER — ANESTHESIA (OUTPATIENT)
Dept: ENDOSCOPY | Age: 63
End: 2018-10-05
Payer: COMMERCIAL

## 2018-10-05 ENCOUNTER — ANESTHESIA EVENT (OUTPATIENT)
Dept: ENDOSCOPY | Age: 63
End: 2018-10-05
Payer: COMMERCIAL

## 2018-10-05 VITALS
SYSTOLIC BLOOD PRESSURE: 134 MMHG | HEART RATE: 66 BPM | RESPIRATION RATE: 15 BRPM | BODY MASS INDEX: 35.41 KG/M2 | DIASTOLIC BLOOD PRESSURE: 88 MMHG | HEIGHT: 64 IN | OXYGEN SATURATION: 100 % | TEMPERATURE: 97.3 F | WEIGHT: 207.38 LBS

## 2018-10-05 PROCEDURE — 77030027957 HC TBNG IRR ENDOGTR BUSS -B: Performed by: COLON & RECTAL SURGERY

## 2018-10-05 PROCEDURE — 74011250636 HC RX REV CODE- 250/636: Performed by: COLON & RECTAL SURGERY

## 2018-10-05 PROCEDURE — 74011250636 HC RX REV CODE- 250/636

## 2018-10-05 PROCEDURE — 76060000032 HC ANESTHESIA 0.5 TO 1 HR: Performed by: COLON & RECTAL SURGERY

## 2018-10-05 PROCEDURE — 76040000007: Performed by: COLON & RECTAL SURGERY

## 2018-10-05 PROCEDURE — 77030013992 HC SNR POLYP ENDOSC BSC -B: Performed by: COLON & RECTAL SURGERY

## 2018-10-05 PROCEDURE — 88305 TISSUE EXAM BY PATHOLOGIST: CPT | Performed by: COLON & RECTAL SURGERY

## 2018-10-05 RX ORDER — LIDOCAINE HYDROCHLORIDE 20 MG/ML
INJECTION, SOLUTION EPIDURAL; INFILTRATION; INTRACAUDAL; PERINEURAL AS NEEDED
Status: DISCONTINUED | OUTPATIENT
Start: 2018-10-05 | End: 2018-10-05 | Stop reason: HOSPADM

## 2018-10-05 RX ORDER — EPINEPHRINE 0.1 MG/ML
1 INJECTION INTRACARDIAC; INTRAVENOUS
Status: DISCONTINUED | OUTPATIENT
Start: 2018-10-05 | End: 2018-10-05 | Stop reason: HOSPADM

## 2018-10-05 RX ORDER — FENTANYL CITRATE 50 UG/ML
12.5-5 INJECTION, SOLUTION INTRAMUSCULAR; INTRAVENOUS
Status: DISCONTINUED | OUTPATIENT
Start: 2018-10-05 | End: 2018-10-05 | Stop reason: HOSPADM

## 2018-10-05 RX ORDER — MIDAZOLAM HYDROCHLORIDE 1 MG/ML
.25-5 INJECTION, SOLUTION INTRAMUSCULAR; INTRAVENOUS
Status: DISCONTINUED | OUTPATIENT
Start: 2018-10-05 | End: 2018-10-05 | Stop reason: HOSPADM

## 2018-10-05 RX ORDER — NALOXONE HYDROCHLORIDE 0.4 MG/ML
0.4 INJECTION, SOLUTION INTRAMUSCULAR; INTRAVENOUS; SUBCUTANEOUS
Status: DISCONTINUED | OUTPATIENT
Start: 2018-10-05 | End: 2018-10-05 | Stop reason: HOSPADM

## 2018-10-05 RX ORDER — SODIUM CHLORIDE 9 MG/ML
100 INJECTION, SOLUTION INTRAVENOUS CONTINUOUS
Status: DISCONTINUED | OUTPATIENT
Start: 2018-10-05 | End: 2018-10-05 | Stop reason: HOSPADM

## 2018-10-05 RX ORDER — ATROPINE SULFATE 0.1 MG/ML
0.5 INJECTION INTRAVENOUS
Status: DISCONTINUED | OUTPATIENT
Start: 2018-10-05 | End: 2018-10-05 | Stop reason: HOSPADM

## 2018-10-05 RX ORDER — DEXTROMETHORPHAN/PSEUDOEPHED 2.5-7.5/.8
1.2 DROPS ORAL
Status: DISCONTINUED | OUTPATIENT
Start: 2018-10-05 | End: 2018-10-05 | Stop reason: HOSPADM

## 2018-10-05 RX ORDER — FLUMAZENIL 0.1 MG/ML
0.2 INJECTION INTRAVENOUS
Status: DISCONTINUED | OUTPATIENT
Start: 2018-10-05 | End: 2018-10-05 | Stop reason: HOSPADM

## 2018-10-05 RX ORDER — NAPROXEN SODIUM 220 MG
220 TABLET ORAL 2 TIMES DAILY WITH MEALS
COMMUNITY
End: 2018-10-05

## 2018-10-05 RX ORDER — SODIUM CHLORIDE 0.9 % (FLUSH) 0.9 %
5-10 SYRINGE (ML) INJECTION EVERY 8 HOURS
Status: DISCONTINUED | OUTPATIENT
Start: 2018-10-05 | End: 2018-10-05 | Stop reason: HOSPADM

## 2018-10-05 RX ORDER — PROPOFOL 10 MG/ML
INJECTION, EMULSION INTRAVENOUS AS NEEDED
Status: DISCONTINUED | OUTPATIENT
Start: 2018-10-05 | End: 2018-10-05 | Stop reason: HOSPADM

## 2018-10-05 RX ORDER — SODIUM CHLORIDE 0.9 % (FLUSH) 0.9 %
5-10 SYRINGE (ML) INJECTION AS NEEDED
Status: DISCONTINUED | OUTPATIENT
Start: 2018-10-05 | End: 2018-10-05 | Stop reason: HOSPADM

## 2018-10-05 RX ADMIN — PROPOFOL 30 MG: 10 INJECTION, EMULSION INTRAVENOUS at 12:34

## 2018-10-05 RX ADMIN — PROPOFOL 40 MG: 10 INJECTION, EMULSION INTRAVENOUS at 12:18

## 2018-10-05 RX ADMIN — LIDOCAINE HYDROCHLORIDE 20 MG: 20 INJECTION, SOLUTION EPIDURAL; INFILTRATION; INTRACAUDAL; PERINEURAL at 12:10

## 2018-10-05 RX ADMIN — PROPOFOL 80 MG: 10 INJECTION, EMULSION INTRAVENOUS at 12:10

## 2018-10-05 RX ADMIN — PROPOFOL 40 MG: 10 INJECTION, EMULSION INTRAVENOUS at 12:24

## 2018-10-05 RX ADMIN — PROPOFOL 30 MG: 10 INJECTION, EMULSION INTRAVENOUS at 12:30

## 2018-10-05 RX ADMIN — PROPOFOL 30 MG: 10 INJECTION, EMULSION INTRAVENOUS at 12:38

## 2018-10-05 RX ADMIN — PROPOFOL 40 MG: 10 INJECTION, EMULSION INTRAVENOUS at 12:36

## 2018-10-05 RX ADMIN — PROPOFOL 40 MG: 10 INJECTION, EMULSION INTRAVENOUS at 12:14

## 2018-10-05 RX ADMIN — PROPOFOL 40 MG: 10 INJECTION, EMULSION INTRAVENOUS at 12:26

## 2018-10-05 RX ADMIN — PROPOFOL 40 MG: 10 INJECTION, EMULSION INTRAVENOUS at 12:20

## 2018-10-05 RX ADMIN — PROPOFOL 30 MG: 10 INJECTION, EMULSION INTRAVENOUS at 12:42

## 2018-10-05 RX ADMIN — PROPOFOL 40 MG: 10 INJECTION, EMULSION INTRAVENOUS at 12:16

## 2018-10-05 RX ADMIN — PROPOFOL 40 MG: 10 INJECTION, EMULSION INTRAVENOUS at 12:28

## 2018-10-05 RX ADMIN — PROPOFOL 30 MG: 10 INJECTION, EMULSION INTRAVENOUS at 12:11

## 2018-10-05 RX ADMIN — PROPOFOL 30 MG: 10 INJECTION, EMULSION INTRAVENOUS at 12:12

## 2018-10-05 RX ADMIN — PROPOFOL 40 MG: 10 INJECTION, EMULSION INTRAVENOUS at 12:40

## 2018-10-05 RX ADMIN — PROPOFOL 40 MG: 10 INJECTION, EMULSION INTRAVENOUS at 12:22

## 2018-10-05 RX ADMIN — SODIUM CHLORIDE 100 ML/HR: 900 INJECTION, SOLUTION INTRAVENOUS at 11:54

## 2018-10-05 RX ADMIN — PROPOFOL 40 MG: 10 INJECTION, EMULSION INTRAVENOUS at 12:32

## 2018-10-05 NOTE — H&P
History and Physical (outpatient)    Patient: Ronni Medrano 59 y.o. female     Referring Physician:  No ref. provider found    Chief Complaint:  Need for colorectal cancer screening. History of Present Illness: The patient is an asymptomatic 60-year-old female who has undergone two colonoscopies. The first was performed on 5/16/2005, and it was negative except for diverticulosis. The second was performed on 4/7/2008 for evaluation of abdominal pain, and it also revealed only diverticulosis without evidence of diverticulitis. The pain later on proved to have been originating from the left hip. Her family history is significant for her father having been diagnosed with colon cancer in his late 45s and for her mother having been diagnosed with anal cancer at age 77 and dying of metastatic disease at age 79. Because of her fathers history, she had been advised to undergo another colonoscopy 5 years after the last one. History:  Past Medical History:   Diagnosis Date    Adjustment disorder 08/2012    Dr. Chiki Ryan. Amy Mcwilliams, counselor    Allergic rhinitis 2016    Dr. Bertha Brody state, unspecified 01/25/07    Asthma 1980    Dr. Charley Samuels, pul. [address] [place] [address] bertha inhalation.  Chickenpox childhood    Cutaneous wart 05/2015    Right thumb. [facility]. Dr. Timmy Coffey.  Depression     Digital mucous cyst 05/2015    Left IP joint. Dr. Jimenez Lobe.  Diverticulosis     Dr. Faye Romberg  DJD (degenerative joint disease) of hip 09/19/12    Dr. Davila Zoila    Eczema 2016    Dr. Nicoletta Aschoff liver 2006    Granulomatous lung disease ([facility] [address] 2017    stable. Dr. Pamela Dunlap.  Hearing aid worn 09/2014    Bilateral ears.  Hematuria 89/3186    Hepatitis B immune March 2014    Prior vaccine--[id number].     High cholesterol 1976    Hypothyroid 10/16/2006    Knee pain     having surgery on 2/12/13 left    Left hip pain 2013    Dr. Delio Russ Measles childhood    Menopause 11/2006    Mumps childhood    Ovarian cyst 02/21/2008    Left and Rt . Mrs. Antonella Waters    Pneumonia 1981    x 2    PONV (postoperative nausea and vomiting)     Scarlet fever 1971    Uterine fibroid 02/21/08    Brenda High, Midwife       Past Surgical History:   Procedure Laterality Date    HX COLONOSCOPY  04/07/08    Dr. Alma Cheney. due q 5 yrs.  HX HIP REPLACEMENT Left 02/12/13    Left.  with Anterior approach. Dr. Lavern Delgados.  HX ORTHOPAEDIC Left 05/18/15    Thumb. IP joint MUCOUS CYST REMOVED. Dr. Jessenia Reese.  HX ORTHOPAEDIC Right 05/18/2015    Thumb. Dr. Willie Zurita.  HX TONSILLECTOMY  1962       Family History   Problem Relation Age of Onset    Cancer Mother      Squamous Cell Anal CA with mets (including skin)    High Cholesterol Mother     Osteoporosis Mother     Colon Cancer Father     Hypertension Father     Cancer Maternal Grandmother      cervical    Stroke Maternal Grandmother      TIAs    Other Maternal Grandmother      multiple blood clots    Osteoporosis Maternal Grandmother     Arthritis-osteo Maternal Grandmother     Dementia Maternal Grandmother      Alzheimers    Diabetes Paternal Grandfather      Social History     Social History    Marital status:      Spouse name: N/A    Number of children: N/A    Years of education: N/A     Occupational History    Not on file. Social History Main Topics    Smoking status: Passive Smoke Exposure - Never Smoker     Years: 1.00    Smokeless tobacco: Never Used      Comment: grew up with smoker parents x 20 yrs    Alcohol use Yes      Comment: one beer every few months    Drug use: No    Sexual activity: No     Other Topics Concern    Not on file     Social History Narrative       Allergies:    Allergies   Allergen Reactions    Aspirin Other (comments)     Triggers asthma & causes chest tightness but takes 325 mg \"once in a while\"    Lipitor [Atorvastatin] Myalgia Elevated CPK    Sulfa (Sulfonamide Antibiotics) Rash    Erythromycin Nausea and Vomiting    Penicillins Nausea and Vomiting     Many years ago       Current Medications:  Cannot display prior to admission medications because the patient has not been admitted in this contact. No current facility-administered medications for this encounter. Current Outpatient Prescriptions   Medication Sig    pravastatin (PRAVACHOL) 20 mg tablet TAKE 1 TABLET BY MOUTH EVERY EVENING    SPIRIVA RESPIMAT 1.25 mcg/actuation inhaler     montelukast (SINGULAIR) 10 mg tablet TAKE 1 TABLET BY MOUTH DAILY FOR ALLERGIES AND ASTHMA.  levothyroxine (SYNTHROID) 100 mcg tablet TAKE 1 TABLET DAILY BEFORE BREAKFAST. TAKE 2 ON SUNDAYS    ADVAIR DISKUS 500-50 mcg/dose diskus inhaler 1 PUFF TWICE A DAY    Azelastine (ASTEPRO) 0.15 % (205.5 mcg) nasal spray     cholecalciferol, VITAMIN D3, (VITAMIN D3) 5,000 unit tab tablet Take  by mouth daily.  sertraline (ZOLOFT) 50 mg tablet TAKE 1 TABLET BY MOUTH DAILY. (Patient taking differently: take 1/2 tablet daily)    triamcinolone (NASACORT AQ) 55 mcg nasal inhaler Use 2 sprays in each nostril everyday    multivitamin (ONE A DAY) tablet Take 1 Tab by mouth daily.  albuterol-ipratropium (DUO-NEB) 2.5 mg-0.5 mg/3 ml nebulizer solution 3 mL by Nebulization route every six (6) hours.  Nebulizer & Compressor machine 1 each by Does Not Apply route daily.  PROAIR HFA 90 mcg/actuation inhaler INHALE 1-2 PUFFS EVERY 4-6 HOURS AS NEEDED            Physical Exam:  There were no vitals taken for this visit. GENERAL:  No apparent distress. LUNGS:  Clear to auscultation bilaterally. HEART:  Regular rate and rhythm with no murmurs, gallops, or rubs. ABDOMEN: Soft, non-tender, and non-distended. NEUROLOGIC: Alert and oriented. No gross deficits.       Alerts:    Hospital Problems  Date Reviewed: 8/29/2018    None          Laboratory:    No results for input(s): HGB, HCT, WBC, PLT, INR, BUN, CREA, K, CRCLT, HGBEXT, HCTEXT, PLTEXT in the last 72 hours. No lab exists for component: PTT, PT, INREXT    Assessment:  A colonoscopy is indicated for colorectal cancer screening. Plan:  The risks, benefits, and alternatives were reviewed with the patient, and she has agreed to proceed with the procedure. The plan for this patient includes MAC.

## 2018-10-05 NOTE — IP AVS SNAPSHOT
[address] [facility] 
[phone] Patient: Simba Fuentes MRN: [id number] [facility]:[birth year] About your hospitalization You were admitted on:  October 5, 2018 You last received care in the:  [facility] ENDOSCOPY You were discharged on:  October 5, 2018 Why you were hospitalized Your primary diagnosis was:  Not on File Follow-up Information Follow up With Details Comments Contact Info [address], [address] [place] [address] 
[address] [employer] [place] [address] 
[phone] Discharge Orders None A check vandana indicates which time of day the medication should be taken. My Medications CHANGE how you take these medications Instructions Each Dose to Equal  
 Morning Noon Evening Bedtime  
 sertraline 50 mg tablet Commonly known as:  ZOLOFT What changed:  See the new instructions. Your last dose was: Your next dose is: TAKE 1 TABLET BY MOUTH DAILY. CONTINUE taking these medications Instructions Each Dose to Equal  
 Morning Noon Evening Bedtime ADVAIR DISKUS 500-50 mcg/dose diskus inhaler Generic drug:  fluticasone-salmeterol Your last dose was: Your next dose is:    
   
   
 1 PUFF TWICE A DAY  
     
   
   
   
  
 albuterol-ipratropium 2.5 mg-0.5 mg/3 ml Nebu Commonly known as:  Shari Isidro Your last dose was: Your next dose is:    
   
   
 3 mL by Nebulization route every six (6) hours. 3 mL Azelastine 0.15 % (205.5 mcg) nasal spray Commonly known as:  ASTEPRO Your last dose was: Your next dose is:    
   
   
      
   
   
   
  
 cholecalciferol (VITAMIN D3) 5,000 unit Tab tablet Commonly known as:  VITAMIN D3 Your last dose was: Your next dose is: Take  by mouth daily. levothyroxine 100 mcg tablet Commonly known as:  SYNTHROID Your last dose was: Your next dose is: TAKE 1 TABLET DAILY BEFORE BREAKFAST. TAKE 2 ON SUNDAYS  
     
   
   
   
  
 montelukast 10 mg tablet Commonly known as:  SINGULAIR Your last dose was: Your next dose is: TAKE 1 TABLET BY MOUTH DAILY FOR ALLERGIES AND ASTHMA.  
     
   
   
   
  
 multivitamin tablet Commonly known as:  ONE A DAY Your last dose was: Your next dose is: Take 1 Tab by mouth daily. 1 Tab Nebulizer & Compressor machine Your last dose was: Your next dose is:    
   
   
 1 each by Does Not Apply route daily. 1 Each  
    
   
   
   
  
 pravastatin 20 mg tablet Commonly known as:  PRAVACHOL Your last dose was: Your next dose is: TAKE 1 TABLET BY MOUTH EVERY EVENING  
     
   
   
   
  
 PROAIR HFA 90 mcg/actuation inhaler Generic drug:  albuterol Your last dose was: Your next dose is:    
   
   
 INHALE 1-2 PUFFS EVERY 4-6 HOURS AS NEEDED  
     
   
   
   
  
 SPIRIVA RESPIMAT 1.25 mcg/actuation inhaler Generic drug:  tiotropium bromide Your last dose was: Your next dose is:    
   
   
      
   
   
   
  
 triamcinolone 55 mcg nasal inhaler Commonly known as:  NASACORT AQ Your last dose was: Your next dose is:    
   
   
 Use 2 sprays in each nostril everyday STOP taking these medications ALEVE 220 mg tablet Generic drug:  naproxen sodium Discharge Instructions SOFIA Marques 
[address] [facility], [address] 
[phone] Post-Polypectomy Instructions 1. Do not drive, operate dangerous machinery, sign legal documents, or conduct any important business for the rest of the day. 2. Avoid strenuous exercise for the next 10 days. 3. Avoid straining when you move your bowels. 4. Do not take any aspirin, aspirin-containing products, ibuprofen (Advil, Motrin, etc.), naproxen, or Aleve for the next two weeks. You may take Tylenol as directed on the bottle if needed. 5. You may begin with a light diet today then advance to your usual diet as tolerated. Do not drink alcohol for the rest of the day. 6. If you notice blood in your stool for more than one or two bowel movements following the procedure, if you develop abdominal pain (aside from gas cramps on the day of the procedure), or if you develop a fever with a temperature of 101.0 or higher, call my office. 7. If you do not have a bowel movement within the next two days, call my office. 8. If you have any other problems or questions, please call my office. 9. Findings and Follow-up:  There was no cancer. There were two small benign-appearing polyps that we removed, and there was extensive diverticulosis (not diverticulitis). We also saw a lipoma in the colon, which is a fatty growth that is not a risk factor for cancer and that requires no treatment or specific follow-up. You should consume a high fiber diet. I will discuss the pathology results with you when they are available, and I will make a follow-up recommendation at that time. Please call my office if you have not heard from me by Wednesday 10/10/2018. Introducing [facility]! Dear Arsalan Postin: 
Thank you for requesting a [employer] account. Our records indicate that you already have an active [employer] account. You can access your account anytime at [email or web address] Did you know that you can access your hospital and ER discharge instructions at any time in Salemarked? You can also review all of your test results from your hospital stay or ER visit. Additional Information If you have questions, please visit the Frequently Asked Questions section of the zeenworld website at [email or web address] Remember, [employer] is NOT to be used for urgent needs. For medical emergencies, dial 911. Now available from your iPhone and Android! Introducing Sancho Jesus As a [employer] patient, I wanted to make you aware of our electronic visit tool called Sancho Jesus. [employer] 24/7 allows you to connect within minutes with a medical provider 24 hours a day, seven days a week via a mobile device or tablet or logging into a secure website from your computer. You can access Sancho Jesus from anywhere in the [place]. A virtual visit might be right for you when you have a simple condition and feel like you just dont want to get out of bed, or cant get away from work for an appointment, when your regular [employer] provider is not available (evenings, weekends or holidays), or when youre out of town and need minor care. Electronic visits cost only $49 and if the [employer] 24/7 provider determines a prescription is needed to treat your condition, one can be electronically transmitted to a nearby pharmacy*. Please take a moment to enroll today if you have not already done so. The enrollment process is free and takes just a few minutes. To enroll, please download the [employer] 24/7 ez to your tablet or phone, or visit [email or web address] to enroll on your computer. And, as an [address] [place] [address] patient with a [employer] account, the results of your visits will be scanned into your electronic medical record and your primary care provider will be able to view the scanned results. We urge you to continue to see your regular [employer] provider for your ongoing medical care. And while your primary care provider may not be the one available when you seek a Sancho Jesus virtual visit, the peace of mind you get from getting a real diagnosis real time can be priceless. For more information on Sancho Jesus, view our Frequently Asked Questions (FAQs) at [email or web address]. Sincerely, 
 
Irving Walker MD 
Chief Medical Officer [employer] *:  certain medications cannot be prescribed via Sancho Jesus Providers Seen During Your Hospitalization Provider Specialty Primary office phone Collins Nguyễn MD Colon and Rectal Surgery [phone] Your Primary Care Physician (PCP) Primary Care Physician Office Phone Office Fax Severa Finger [phone] You are allergic to the following Allergen Reactions Aspirin Other (comments) Triggers asthma & causes chest tightness but takes 325 mg \"once in a while\" Lipitor (Atorvastatin) Myalgia Elevated CPK Sulfa (Sulfonamide Antibiotics) Rash Erythromycin Nausea and Vomiting Penicillins Nausea and Vomiting Many years ago Recent Documentation Height Weight Breastfeeding? BMI OB Status Smoking Status 1.626 m 94.1 kg No 35.6 kg/m2 Postmenopausal Passive Smoke Exposure - Never Smoker Emergency Contacts Name Discharge Info Relation Home Work Mobile ThomNorah DISCHARGE CAREGIVER [3] Other Relative [6] [phone] Patient Belongings The following personal items are in your possession at time of discharge: 
  Dental Appliances: None  Visual Aid: Glasses   Hearing Aids/Status: At home Please provide this summary of care documentation to your next provider. Signatures-by signing, you are acknowledging that this After Visit Summary has been reviewed with you and you have received a copy. Patient Signature:  ____________________________________________________________ Date:  ____________________________________________________________  
  
Charan Skelton  Provider Signature: ____________________________________________________________ Date:  ____________________________________________________________

## 2018-10-05 NOTE — PROGRESS NOTES
Eye glasses removed and placed in white bag on IV pole , has patient label on it. Hearing aids left at home.

## 2018-10-05 NOTE — DISCHARGE INSTRUCTIONS
Osker Duverney, [address] [facility],[place] [address]., [address] [facility], [facility]  [phone]      Post-Polypectomy Instructions    1. Do not drive, operate dangerous machinery, sign legal documents, or conduct any important business for the rest of the day. 2. Avoid strenuous exercise for the next 10 days. 3. Avoid straining when you move your bowels. 4. Do not take any aspirin, aspirin-containing products, ibuprofen (Advil, Motrin, etc.), naproxen, or Aleve for the next two weeks. You may take Tylenol as directed on the bottle if needed. 5. You may begin with a light diet today then advance to your usual diet as tolerated. Do not drink alcohol for the rest of the day. 6. If you notice blood in your stool for more than one or two bowel movements following the procedure, if you develop abdominal pain (aside from gas cramps on the day of the procedure), or if you develop a fever with a temperature of 101.0 or higher, call my office. 7. If you do not have a bowel movement within the next two days, call my office. 8. If you have any other problems or questions, please call my office. 9. Findings and Follow-up:  There was no cancer. There were two small benign-appearing polyps that we removed, and there was extensive diverticulosis (not diverticulitis). We also saw a lipoma in the colon, which is a fatty growth that is not a risk factor for cancer and that requires no treatment or specific follow-up. You should consume a high fiber diet. I will discuss the pathology results with you when they are available, and I will make a follow-up recommendation at that time. Please call my office if you have not heard from me by Wednesday 10/10/2018.

## 2018-10-05 NOTE — ANESTHESIA PREPROCEDURE EVALUATION
Anesthetic History     PONV          Review of Systems / Medical History  Patient summary reviewed, nursing notes reviewed and pertinent labs reviewed    Pulmonary            Asthma        Neuro/Psych         Psychiatric history     Cardiovascular  Within defined limits                     GI/Hepatic/Renal       Hepatitis: type B    Liver disease     Endo/Other      Hypothyroidism  Obesity and arthritis     Other Findings              Physical Exam    Airway  Mallampati: II  TM Distance: > 6 cm  Neck ROM: normal range of motion   Mouth opening: Normal     Cardiovascular  Regular rate and rhythm,  S1 and S2 normal,  no murmur, click, rub, or gallop             Dental  No notable dental hx       Pulmonary  Breath sounds clear to auscultation               Abdominal  GI exam deferred       Other Findings            Anesthetic Plan    ASA: 3  Anesthesia type: MAC          Induction: Intravenous  Anesthetic plan and risks discussed with: Patient

## 2018-10-05 NOTE — ROUTINE PROCESS
Callie Tadeo  1955  [id number]    Situation:  Verbal report received from: Kd Early RN  Procedure: Procedure(s):  COLONOSCOPY  ENDOSCOPIC POLYPECTOMY    Background:    Preoperative diagnosis: SCREENING, FAMILY HISTORY COLON CANCER  Postoperative diagnosis: colon polyps, diverticulosis, colonic lipima    :  Dr. Danny Bustos  Assistant(s): Endoscopy Technician-1: Jimmy Arias IV  Endoscopy RN-1: Randell Sanford    Specimens:   ID Type Source Tests Collected by Time Destination   1 : sigmoid colon polyp ( snare) Preservative Sigmoid  Alan Gardiner MD 10/5/2018 1218 Pathology   2 : ascending colon polyp Preservative Colon, Ascending  Alan Gardiner MD 10/5/2018 1232 Pathology     H. Pylori  no    Assessment:  Intra-procedure medications         Anesthesia gave intra-procedure sedation and medications, see anesthesia flow sheet yes    Intravenous fluids: NS@ KVO     Vital signs stable     Abdominal assessment: round and soft     Recommendation:  Discharge patient per MD order  Family or Friend   Permission to share finding with family or friend yes

## 2018-10-05 NOTE — PROCEDURES
Tom Qiu  [id number]  1955    Colonoscopy Operative Report    Procedure Type:   Colonoscopy with snare and hot forceps polypectomies. Pre-operative Diagnosis:  Need for colorectal cancer screening. Family history of colon cancer. Post-operative Diagnosis:  Colonic polyps. Diverticulosis. Colonic lipoma. Surgeon:  James Varner MD    Referring Provider: Tresa Philip DO    Sedation and Analgesia:  MAC anesthesia Propofol (700 mg)    Specimens Removed:  Polyps (2). EBL:  0 mL. Complications: None apparent. Indication For Procedure: The patient is an asymptomatic 59-year-old female who has undergone two colonoscopies. The first was performed on 5/16/2005, and it was negative except for diverticulosis. The second was performed on 4/7/2008 for evaluation of abdominal pain, and it also revealed only diverticulosis without evidence of diverticulitis. The pain later on proved to have been originating from the left hip. Her family history is significant for her father having been diagnosed with colon cancer in his late 45s and for her mother having been diagnosed with anal cancer at age 77 and dying of metastatic disease at age 79. Because of her fathers history, she had been advised to undergo another colonoscopy 5 years after the last one. Another colonoscopy is indicated for colorectal cancer screening. Findings: There was one diminutive polyp located in the sigmoid colon and one in the ascending colon. There was a submucosal lipoma in the transverse colon. There was extensive pancolonic diverticulosis. Procedure Details:  After informed consent was obtained, the patient was taken to the endoscopy suite where standard monitoring devices were attached and intravenous access was established. Sedation was administered by the anesthetist as needed throughout the procedure.   The patient was placed in the left lateral decubitus position, and inspection of the perineum revealed no significant external lesions. Digital rectal examination revealed no masses. The Olympus videocolonoscope was lubricated and inserted transanally into the rectum. It was advanced into the colon and without difficulty to the cecum, which was identified by the presence of the ileocecal valve and the appendiceal orifice. The quality of the bowel preparation was good, as was the overall visualization. Careful inspection was performed during withdrawal of the colonoscope. The sigmoid colon polyp was excised using the hot snare during the advancement of the scope, and the ascending colon polyp was excised using the hot forceps during withdrawal.  Hemostasis was excellent. There were no apparent complications, and the patient appeared to have tolerated the procedure well. At its conclusion, she was transported to the recovery area in good condition. Impression:    The polyps appeared to have been benign. Recommendations: The patient should consume a high fiber diet. The lipoma is asymptomatic and requires no treatment or specific follow-up. I will contact the patient with the pathology results and make a follow-up recommendation at that time.

## 2018-10-08 NOTE — ANESTHESIA POSTPROCEDURE EVALUATION
Post-Anesthesia Evaluation and Assessment    Patient: Sarah Beth Handy MRN: [id number]  SSN: [id number]    YOB: 1955  Age: 59 y.o. Sex: female       Cardiovascular Function/Vital Signs  Visit Vitals    /88    Pulse 66    Temp 36.3 °C (97.3 °F)    Resp 15    Ht 5' 4\" (1.626 m)    Wt 94.1 kg (207 lb 6 oz)    SpO2 100%    Breastfeeding No    BMI 35.6 kg/m2       Patient is status post MAC anesthesia for Procedure(s):  COLONOSCOPY  ENDOSCOPIC POLYPECTOMY. Nausea/Vomiting: None    Postoperative hydration reviewed and adequate. Pain:  Pain Scale 1: Numeric (0 - 10) (10/05/18 1316)  Pain Intensity 1: 0 (10/05/18 1316)   Managed    Neurological Status: At baseline    Mental Status and Level of Consciousness: Arousable    Pulmonary Status:   O2 Device: Room air (10/05/18 1316)   Adequate oxygenation and airway patent    Complications related to anesthesia: None    Post-anesthesia assessment completed.  No concerns    Signed By: Chris Ritter MD     October 8, 2018

## 2018-11-07 ENCOUNTER — TELEPHONE (OUTPATIENT)
Dept: FAMILY MEDICINE CLINIC | Age: 63
End: 2018-11-07

## 2018-11-30 LAB
25(OH)D3+25(OH)D2 SERPL-MCNC: 51.1 NG/ML (ref 30–100)
ALBUMIN SERPL-MCNC: 4.5 G/DL (ref 3.6–4.8)
ALBUMIN/GLOB SERPL: 3 {RATIO} (ref 1.2–2.2)
ALP SERPL-CCNC: 64 IU/L (ref 39–117)
ALT SERPL-CCNC: 20 IU/L (ref 0–32)
AST SERPL-CCNC: 17 IU/L (ref 0–40)
BILIRUB SERPL-MCNC: 0.4 MG/DL (ref 0–1.2)
BUN SERPL-MCNC: 23 MG/DL (ref 8–27)
BUN/CREAT SERPL: 23 (ref 12–28)
CALCIUM SERPL-MCNC: 9.1 MG/DL (ref 8.7–10.3)
CHLORIDE SERPL-SCNC: 106 MMOL/L (ref 96–106)
CHOLEST SERPL-MCNC: 196 MG/DL (ref 100–199)
CO2 SERPL-SCNC: 27 MMOL/L (ref 20–29)
CREAT SERPL-MCNC: 1.01 MG/DL (ref 0.57–1)
EST. AVERAGE GLUCOSE BLD GHB EST-MCNC: 111 MG/DL
GLOBULIN SER CALC-MCNC: 1.5 G/DL (ref 1.5–4.5)
GLUCOSE SERPL-MCNC: 93 MG/DL (ref 65–99)
HBA1C MFR BLD: 5.5 % (ref 4.8–5.6)
HDLC SERPL-MCNC: 52 MG/DL
INTERPRETATION, 910389: NORMAL
LDLC SERPL CALC-MCNC: 117 MG/DL (ref 0–99)
POTASSIUM SERPL-SCNC: 4.6 MMOL/L (ref 3.5–5.2)
PROT SERPL-MCNC: 6 G/DL (ref 6–8.5)
SODIUM SERPL-SCNC: 144 MMOL/L (ref 134–144)
T4 FREE SERPL-MCNC: 1.51 NG/DL (ref 0.82–1.77)
TRIGL SERPL-MCNC: 133 MG/DL (ref 0–149)
TSH SERPL DL<=0.005 MIU/L-ACNC: 0.72 UIU/ML (ref 0.45–4.5)
VLDLC SERPL CALC-MCNC: 27 MG/DL (ref 5–40)

## 2019-01-13 DIAGNOSIS — J45.909 UNCOMPLICATED ASTHMA, UNSPECIFIED ASTHMA SEVERITY, UNSPECIFIED WHETHER PERSISTENT: ICD-10-CM

## 2019-01-13 RX ORDER — FLUTICASONE PROPIONATE AND SALMETEROL 50; 500 UG/1; UG/1
POWDER RESPIRATORY (INHALATION)
Qty: 1 EACH | Refills: 11 | Status: SHIPPED | OUTPATIENT
Start: 2019-01-13 | End: 2020-02-18

## 2019-05-17 ENCOUNTER — HOSPITAL ENCOUNTER (OUTPATIENT)
Dept: GENERAL RADIOLOGY | Age: 64
Discharge: HOME | End: 2019-05-17
Payer: COMMERCIAL

## 2019-05-17 DIAGNOSIS — J45.40 MODERATE PERSISTENT ASTHMA: ICD-10-CM

## 2019-05-17 PROCEDURE — 71046 X-RAY EXAM CHEST 2 VIEWS: CPT

## 2019-07-02 ENCOUNTER — HOSPITAL ENCOUNTER (OUTPATIENT)
Dept: GENERAL RADIOLOGY | Age: 64
Discharge: HOME | End: 2019-07-02
Payer: COMMERCIAL

## 2019-07-02 ENCOUNTER — OFFICE VISIT (OUTPATIENT)
Dept: FAMILY MEDICINE CLINIC | Age: 64
End: 2019-07-02

## 2019-07-02 VITALS
TEMPERATURE: 97.9 F | DIASTOLIC BLOOD PRESSURE: 73 MMHG | SYSTOLIC BLOOD PRESSURE: 136 MMHG | HEIGHT: 64 IN | OXYGEN SATURATION: 97 % | WEIGHT: 211.1 LBS | RESPIRATION RATE: 19 BRPM | HEART RATE: 67 BPM | BODY MASS INDEX: 36.04 KG/M2

## 2019-07-02 DIAGNOSIS — E66.01 SEVERE OBESITY (HCC): ICD-10-CM

## 2019-07-02 DIAGNOSIS — F43.23 ADJUSTMENT DISORDER WITH MIXED ANXIETY AND DEPRESSED MOOD: ICD-10-CM

## 2019-07-02 DIAGNOSIS — E55.9 VITAMIN D DEFICIENCY: ICD-10-CM

## 2019-07-02 DIAGNOSIS — R73.9 HYPERGLYCEMIA: ICD-10-CM

## 2019-07-02 DIAGNOSIS — R91.1 SOLITARY PULMONARY NODULE: ICD-10-CM

## 2019-07-02 DIAGNOSIS — Z12.39 BREAST CANCER SCREENING: ICD-10-CM

## 2019-07-02 DIAGNOSIS — E03.4 HYPOTHYROIDISM DUE TO ACQUIRED ATROPHY OF THYROID: ICD-10-CM

## 2019-07-02 DIAGNOSIS — E78.5 DYSLIPIDEMIA: Primary | ICD-10-CM

## 2019-07-02 DIAGNOSIS — J45.21 MILD INTERMITTENT ASTHMA WITH ACUTE EXACERBATION: ICD-10-CM

## 2019-07-02 DIAGNOSIS — R79.89 ELEVATED SERUM CREATININE: ICD-10-CM

## 2019-07-02 PROCEDURE — 71046 X-RAY EXAM CHEST 2 VIEWS: CPT

## 2019-07-02 RX ORDER — PRAVASTATIN SODIUM 20 MG/1
TABLET ORAL
Qty: 90 TAB | Refills: 1 | Status: SHIPPED | OUTPATIENT
Start: 2019-07-02 | End: 2020-01-07 | Stop reason: SDUPTHER

## 2019-07-02 RX ORDER — LEVOTHYROXINE SODIUM 100 UG/1
TABLET ORAL
Qty: 35 TAB | Refills: 5 | Status: SHIPPED | OUTPATIENT
Start: 2019-07-02 | End: 2020-01-07 | Stop reason: SDUPTHER

## 2019-07-02 RX ORDER — MONTELUKAST SODIUM 10 MG/1
TABLET ORAL
Qty: 30 TAB | Refills: 11 | Status: CANCELLED | OUTPATIENT
Start: 2019-07-02

## 2019-07-02 NOTE — PROGRESS NOTES
Nickie Coppola  Identified pt with two pt identifiers(name and ). Chief Complaint   Patient presents with    Blood Pressure Check    Results         1. Have you been to the ER, urgent care clinic since your last visit? Hospitalized since your last visit? NO    2. Have you seen or consulted any other health care providers outside of the [address] [place] [address] since your last visit? Include any pap smears or colon screening. NO      My Chart     My chart gives you direct online access to portions of the electronic medical record (EMR) where your doctor stores your health information (ie, lab results, appointment information, medications, immunizations, and more. It is free. Would you like to set up your my chart? YES    [unfilled]    Weight Metrics 2019 10/5/2018 2018 2018 2017 2017 2016   Weight 211 lb 1.6 oz 207 lb 6 oz 210 lb 3.2 oz 212 lb 11.2 oz 205 lb 14.4 oz 205 lb 3.2 oz 212 lb 8.4 oz   BMI 36.24 kg/m2 35.6 kg/m2 35.23 kg/m2 33.31 kg/m2 32.25 kg/m2 32.14 kg/m2 33.28 kg/m2           Medication reconciliation up to date and corrected with patient at this time. Advance Care Planning    In the event something were to happen to you and you were unable to speak on your behalf, do you have an Advance Directive/ Living Will in place stating your wishes? NO    If yes, do we have a copy on file NO    If no, would you like information YES      ====Jeaneth Mojica Invitation====    Patient was invited to [facility] on this date and given the information folder for review. Recommended appointment with Jeaneth Mojica facilitator for ACP conversation regarding advance directives. [x] Yes  [] No  Referral sent to [facility] Choices team member or Coordinator for follow-up    [] Yes  [x] No  Patient scheduled an appointment. Site of Referral: [facility]      Today's provider has been notified of reason for visit, vitals and flowsheets obtained on patients. Reviewed record in preparation for visit, huddled with provider and have obtained necessary documentation.       Health Maintenance Due   Topic    Pneumococcal 0-64 years (1 of 1 - PPSV23)    Shingrix Vaccine Age 49> (1 of 2)    BREAST CANCER SCRN MAMMOGRAM        Wt Readings from Last 3 Encounters:   07/02/19 211 lb 1.6 oz (95.8 kg)   10/05/18 207 lb 6 oz (94.1 kg)   08/21/18 210 lb 3.2 oz (95.3 kg)     Temp Readings from Last 3 Encounters:   07/02/19 97.9 °F (36.6 °C) (Oral)   10/05/18 97.3 °F (36.3 °C)   08/21/18 98.4 °F (36.9 °C) (Oral)     BP Readings from Last 3 Encounters:   07/02/19 136/73   10/05/18 134/88   08/21/18 136/78     Pulse Readings from Last 3 Encounters:   07/02/19 67   10/05/18 66   08/21/18 67     Vitals:    07/02/19 1026   BP: 136/73   Pulse: 67   Resp: 19   Temp: 97.9 °F (36.6 °C)   TempSrc: Oral   SpO2: 97%   Weight: 211 lb 1.6 oz (95.8 kg)   Height: 5' 4\" (1.626 m)   PainSc:   0 - No pain         Learning Assessment:  :     Learning Assessment 1/27/2015   PRIMARY LEARNER Patient   HIGHEST LEVEL OF EDUCATION - PRIMARY LEARNER  > 4 YEARS OF COLLEGE   BARRIERS PRIMARY LEARNER HEARING   CO-LEARNER CAREGIVER No   PRIMARY LANGUAGE ENGLISH   LEARNER PREFERENCE PRIMARY DEMONSTRATION     VIDEOS     READING     LISTENING   ANSWERED BY patient   RELATIONSHIP SELF       Depression Screening:  :     3 most recent PHQ Screens 7/2/2019   Little interest or pleasure in doing things Several days   Feeling down, depressed, irritable, or hopeless Several days   Total Score PHQ 2 2   Trouble falling or staying asleep, or sleeping too much Not at all   Feeling tired or having little energy Not at all   Poor appetite, weight loss, or overeating Not at all   Feeling bad about yourself - or that you are a failure or have let yourself or your family down Not at all   Trouble concentrating on things such as school, work, reading, or watching TV Not at all   Moving or speaking so slowly that other people could have noticed; or the opposite being so fidgety that others notice Not at all   Thoughts of being better off dead, or hurting yourself in some way Not at all   PHQ 9 Score 2   How difficult have these problems made it for you to do your work, take care of your home and get along with others Not difficult at all       Fall Risk Assessment:  :     Fall Risk Assessment, last 12 mths 7/2/2019   Able to walk? Yes   Fall in past 12 months? No       Abuse Screening:  :     Abuse Screening Questionnaire 7/2/2019   Do you ever feel afraid of your partner? N   Are you in a relationship with someone who physically or mentally threatens you? N   Is it safe for you to go home?  Y       ADL Screening:  :     ADL Assessment 7/2/2019   Feeding yourself No Help Needed   Getting from bed to chair No Help Needed   Getting dressed No Help Needed   Bathing or showering No Help Needed   Walk across the room (includes cane/walker) No Help Needed   Using the telphone No Help Needed   Taking your medications No Help Needed   Preparing meals No Help Needed   Managing money (expenses/bills) No Help Needed   Moderately strenuous housework (laundry) No Help Needed   Shopping for personal items (toiletries/medicines) No Help Needed   Shopping for groceries No Help Needed   Driving No Help Needed   Climbing a flight of stairs No Help Needed   Getting to places beyond walking distances No Help Needed

## 2019-07-02 NOTE — PROGRESS NOTES
HISTORY OF PRESENT ILLNESS  Conner Thompson is a 62 y.o. female presents with Blood Pressure Check; Results; Stress; Referral Follow Up; and Medication Refill    Agree with nurse note. Pt with dyslipidemia, hypothyroidism, vit d deficiency, elevated Cr, and hyperglycemia presents to the office with a BP of 136/73. Patient denies vision changes, headaches, dizziness, chest pain, SOB, or swelling. Pt requests to review labs from 11/29/2018. A1C 5.5, FT4 1.51, TSH 0.722, , HDL 52, Trigs 133, glucose 93, Cr 1.01, Na 144, K 4.6, Cl 106, ALP 64, AST 17, ALT 20, vit d 51.1. She takes Pravachol 20 mg for cholesterol; tolerating well. She takes Synthroid 100 mcg daily with 2 tabs on Sunday. She requests refills today. Pt with adjustment disorder with mixed anxiety and depressed mood. She uses Zoloft 1/2 tab 50 mg daily. She reports she can't focus well when taking Zoloft 50 mg daily. No SI/HI. She sleeps 8 hours nightly. Stressors: her 3 grand kids have been living with her for the last year. They are not in contact with their mother and their father works a lot. Pt with asthma. Uses Ventolin inhaler as needed (less than twice weekly), Advair daily, and Singulair qhs. She has an appointment with Dr. Manjeet Wright for asthma today. Health Maintenance    She is going to schedule a GYN appointment with [facility]. Written by mateusz Tejeda, as dictated by Dr. Kayla Thomas DO.      ROS    Review of Systems negative except as noted above in HPI.     ALLERGIES:    Allergies   Allergen Reactions    Aspirin Other (comments)     Triggers asthma & causes chest tightness but takes 325 mg \"once in a while\"    Lipitor [Atorvastatin] Myalgia     Elevated CPK    Sulfa (Sulfonamide Antibiotics) Rash    Erythromycin Nausea and Vomiting    Penicillins Nausea and Vomiting     Many years ago       CURRENT MEDICATIONS:    Outpatient Medications Marked as Taking for the 19 encounter (Office Visit) with Nancy Estrada, DO   Medication Sig Dispense Refill    [] varicella-zoster recombinant, PF, (SHINGRIX, PF,) 50 mcg/0.5 mL susr injection 0.5 mL by IntraMUSCular route once for 1 dose. 0.5 mL 1    pravastatin (PRAVACHOL) 20 mg tablet TAKE 1 TABLET BY MOUTH EVERY EVENING  Indications: combined high blood cholesterol and triglyceride level 90 Tab 1    levothyroxine (SYNTHROID) 100 mcg tablet TAKE 1 TABLET DAILY BEFORE BREAKFAST. TAKE 2 ON SUNDAYS 35 Tab 5    montelukast (SINGULAIR) 10 mg tablet TAKE 1 TABLET BY MOUTH DAILY FOR ALLERGIES AND ASTHMA. 30 Tab 11    ADVAIR DISKUS 500-50 mcg/dose diskus inhaler 1 PUFF TWICE A DAY 1 Each 11    sertraline (ZOLOFT) 50 mg tablet TAKE 1 TABLET BY MOUTH DAILY. (Patient taking differently: TAKE 1/2 TABLET BY MOUTH DAILY. ) 90 Tab 2    cholecalciferol, VITAMIN D3, (VITAMIN D3) 5,000 unit tab tablet Take  by mouth daily.  triamcinolone (NASACORT AQ) 55 mcg nasal inhaler Use 2 sprays in each nostril everyday 1 Bottle 5    multivitamin (ONE A DAY) tablet Take 1 Tab by mouth daily.  Nebulizer & Compressor machine 1 each by Does Not Apply route daily. 1 each 0    PROAIR HFA 90 mcg/actuation inhaler INHALE 1-2 PUFFS EVERY 4-6 HOURS AS NEEDED 1 Inhaler 5       PAST MEDICAL HISTORY:    Past Medical History:   Diagnosis Date    Adjustment disorder 2012    Dr. Maris Hyatt. Yang Cronin, counselor    Allergic rhinitis     Dr. Ashford Hush state, unspecified 07    Asthma 1980    Dr. Grzegorz Powell, pulm. [address] bertha inhalation.  Chickenpox childhood    Cutaneous wart 2015    Right thumb. [facility]. Dr. David Carlisle.  Depression     Digital mucous cyst 2015    Left IP joint. Dr. Wilber Jade.     Diverticulosis     Dr. Armond GOMES (degenerative joint disease) of hip 12    Dr. Nilda Su    Eczema 2016    Dr. Jono Kan liver 2006    Granulomatous lung disease ([facility] [address] 2017    stable. Dr. Eric Aguirre.  Hearing aid worn 09/2014    Bilateral ears.  Hematuria 23/4862    Hepatitis B immune March 2014    Prior vaccine--[id number].  High cholesterol 1976    Hypothyroid 10/16/2006    Knee pain     having surgery on 2/12/13 left    Left hip pain 2013    Dr. Tiana Schulte Measles childhood    Menopause 11/2006    Mumps childhood    Ovarian cyst 02/21/2008    Left and Rt . Mrs. Berman Bronjuliano    Pneumonia 1981    x 2    PONV (postoperative nausea and vomiting)     Scarlet fever 1971    Uterine fibroid 02/21/08    Johnny Mcardle, Midwife       PAST SURGICAL HISTORY:    Past Surgical History:   Procedure Laterality Date    COLONOSCOPY N/A 10/5/2018    COLONOSCOPY performed [facility] D[facility]kins [facility] at [facility] ENDOSCOPY    HX COLONOSCOPY  04/07/08    Dr. Darrin Habermann. due q 5 yrs.  HX HIP REPLACEMENT Left 02/12/13    Left.  with Anterior approach. Dr. Estefania Cuellar.  HX ORTHOPAEDIC Left 05/18/15    Thumb. IP joint MUCOUS CYST REMOVED. Dr. Annette Hdz.  HX ORTHOPAEDIC Right 05/18/2015    Thumb. Dr. Luc Pathak.     HX TONSILLECTOMY  1962       FAMILY HISTORY:    Family History   Problem Relation Age of Onset   Gonzalez Cancer Mother         Squamous Cell Anal CA with mets (including skin)    High Cholesterol Mother     Osteoporosis Mother     Colon Cancer Father     Hypertension Father     Cancer Maternal Grandmother         cervical    Stroke Maternal Grandmother         TIAs    Other Maternal Grandmother         multiple blood clots    Osteoporosis Maternal Grandmother     Arthritis-osteo Maternal Grandmother     Dementia Maternal Grandmother         Alzheimers    Diabetes Paternal Grandfather        SOCIAL HISTORY:    Social History     Socioeconomic History    Marital status:      Spouse name: Not on file    Number of children: Not on file    Years of education: Not on file    Highest education level: Not on file   Tobacco Use    Smoking status: Passive Smoke Exposure - Never Smoker    Smokeless tobacco: Never Used    Tobacco comment: grew up with smoker parents x 20 yrs   Substance and Sexual Activity    Alcohol use: Yes     Comment: one beer every few months    Drug use: No    Sexual activity: Never     Partners: Female     Birth control/protection: Abstinence       IMMUNIZATIONS:    Immunization History   Administered Date(s) Administered    (RETIRED) Pneumococcal Vaccine (Unspecified Type) 12/01/1998    Hep A Vaccine (Adult) 03/05/2014    Hepatitis B Vaccine 10/14/2010, 11/14/2010, 04/29/2011    Influenza Vaccine 10/25/2016    Influenza Vaccine (Quad) 01/15/2016    Influenza Vaccine Split 11/22/2011, 10/09/2012    Influenza Vaccine Whole 10/14/2010    TD Vaccine 10/01/1999    TDAP Vaccine 10/18/2010         PHYSICAL EXAMINATION    Vital Signs    Visit Vitals  /73   Pulse 67   Temp 97.9 °F (36.6 °C) (Oral)   Resp 19   Ht 5' 4\" (1.626 m)   Wt 211 lb 1.6 oz (95.8 kg)   SpO2 97%   BMI 36.24 kg/m²       Weight Metrics 7/2/2019 10/5/2018 8/21/2018 2/22/2018 8/22/2017 2/21/2017 1/26/2016   Weight 211 lb 1.6 oz 207 lb 6 oz 210 lb 3.2 oz 212 lb 11.2 oz 205 lb 14.4 oz 205 lb 3.2 oz 212 lb 8.4 oz   BMI 36.24 kg/m2 35.6 kg/m2 35.23 kg/m2 33.31 kg/m2 32.25 kg/m2 32.14 kg/m2 33.28 kg/m2       General appearance - Well nourished. Well appearing. Well developed. No acute distress. Obese. Head - Normocephalic. Atraumatic. Eyes - pupils equal and reactive. Extraocular eye movements intact. Sclera anicteric. Mildly injected sclera. Ears - Hearing is grossly normal bilaterally. BL hearing aids   Nose - normal and patent. No polyps noted. No erythema. No discharge. Mouth - mucous membranes with adequate moisture. Posterior pharynx normal with cobblestone appearance. No erythema, white exudate or obstruction. Neck - supple. Midline trachea. No carotid bruits noted bilaterally. No thyromegaly noted.   Chest - clear to auscultation bilaterally anteriorly and posteriorly. No wheezes. No rales or rhonchi. Breath sounds are symmetrical bilaterally. Unlabored respirations. Heart - normal rate. Regular rhythm. Normal S1, S2. No murmur noted. No rubs, clicks or gallops noted. Abdomen - soft and distended. No masses or organomegaly. No rebound, rigidity or guarding. Bowel sounds normal x 4 quadrants. No tenderness noted. Neurological - awake, alert and oriented to person, place, and time and event. Cranial nerves II through XII intact. Clear speech. Muscle strength is +5/5 x 4 extremities. Sensation is intact to light touch bilaterally. Steady gait. Heme/Lymph - peripheral pulses normal x 4 extremities. No peripheral edema is noted. Musculoskeletal - Intact x 4 extremities. Full ROM x 4 extremities. No pain with movement. Back exam - normal range of motion. No pain on palpation of the spinous processes in the cervical, thoracic, lumbar, sacral regions. No CVA tenderness. Skin - no rashes, erythema, ecchymosis, lacerations, abrasions, suspicious moles noted  Psychological -   normal behavior, dress and thought processes. Good insight. Good eye contact. Normal affect. Appropriate mood. Normal speech.       DATA REVIEWED    Lab Results   Component Value Date/Time    WBC 5.7 03/24/2017 08:38 AM    Hemoglobin (POC) 11.3 (A) 02/12/2013 03:40 PM    HGB 14.1 03/24/2017 08:38 AM    HCT 42.3 03/24/2017 08:38 AM    PLATELET 070 86/03/3934 08:38 AM    MCV 86 03/24/2017 08:38 AM     Lab Results   Component Value Date/Time    Sodium 144 11/29/2018 09:02 AM    Potassium 4.6 11/29/2018 09:02 AM    Chloride 106 11/29/2018 09:02 AM    CO2 27 11/29/2018 09:02 AM    Anion gap 10 01/26/2016 06:16 PM    Glucose 93 11/29/2018 09:02 AM    BUN 23 11/29/2018 09:02 AM    Creatinine 1.01 (H) 11/29/2018 09:02 AM    BUN/Creatinine ratio 23 11/29/2018 09:02 AM    GFR est AA 69 11/29/2018 09:02 AM    GFR est non-AA 60 11/29/2018 09:02 AM    Calcium 9.1 11/29/2018 09:02 AM    Bilirubin, total 0.4 11/29/2018 09:02 AM    AST (SGOT) 17 11/29/2018 09:02 AM    Alk. phosphatase 64 11/29/2018 09:02 AM    Protein, total 6.0 11/29/2018 09:02 AM    Albumin 4.5 11/29/2018 09:02 AM    Globulin 3.8 01/26/2016 06:16 PM    A-G Ratio 3.0 (H) 11/29/2018 09:02 AM    ALT (SGPT) 20 11/29/2018 09:02 AM     Lab Results   Component Value Date/Time    Cholesterol, total 196 11/29/2018 09:02 AM    HDL Cholesterol 52 11/29/2018 09:02 AM    LDL, calculated 117 (H) 11/29/2018 09:02 AM    VLDL, calculated 27 11/29/2018 09:02 AM    Triglyceride 133 11/29/2018 09:02 AM    CHOL/HDL Ratio 7.3 (H) 02/04/2010 11:40 AM     Lab Results   Component Value Date/Time    Vitamin D 25-Hydroxy 43.8 06/21/2011 08:00 AM    VITAMIN D, 25-HYDROXY 51.1 11/29/2018 09:02 AM       Lab Results   Component Value Date/Time    Hemoglobin A1c 5.5 11/29/2018 09:02 AM     Lab Results   Component Value Date/Time    TSH 0.722 11/29/2018 09:02 AM       ASSESSMENT and PLAN      ICD-10-CM ICD-9-CM    1. Dyslipidemia E78.5 272.4 pravastatin (PRAVACHOL) 20 mg tablet      LIPID PANEL      METABOLIC PANEL, COMPREHENSIVE    improving    2. Adjustment disorder with mixed anxiety and depressed mood F43.23 309.28 varicella-zoster recombinant, PF, (SHINGRIX, PF,) 50 mcg/0.5 mL susr injection    worse due to family stress, stable on Zoloft 25 mg daily   3. Hypothyroidism due to acquired atrophy of thyroid E03.4 244.8 levothyroxine (SYNTHROID) 100 mcg tablet     246.8 TSH 3RD GENERATION      T4, FREE    stable on Synthroid   4. Vitamin D deficiency E55.9 268.9 VITAMIN D, 25 HYDROXY   5. Hyperglycemia R73.9 790.29 TSH 3RD GENERATION      HEMOGLOBIN A1C WITH EAG    resolved    6. Mild intermittent asthma with acute exacerbation J45.21 493.92     stable on inhalers   7. Breast cancer screening Z12.31 V76.10 SURESH 3D OMA W MAMMO BI SCREENING INCL CAD   8.  Elevated serum creatinine R79.89 790.99 Discussed the patient's BMI with her. The BMI follow up plan is as follows: I have counseled this patient on diet and exercise regimensDecrease carbohydrates (white foods, sweet foods, sweet drinks and alcohol), increase green leafy vegetables and protein (lean meats and beans) with each meal.  Avoid fried foods. Eat 3-5 small meals daily. Do not skip meals. Increase water intake. Increase physical activity to 30 minutes daily for health benefit or 60 minutes daily to prevent weight regain, as tolerated. Get 7-8 hours uninterrupted sleep nightly. Chart reviewed and updated. Continue current medications and care. Prescriptions written and sent to pharmacy; medication side effects discussed. Pravachol 20 mg, Synthroid 100 mcg  Most recent tests reviewed from 11/29/2018. Recheck pertinent labs today. Recent office visit notes from Dr. Edil Martin reviewed. Get recent office visit notes from Dr. Anastacia López. Advised pt to sign release. Referrals given; patient urged to keep appointments with specialists. Mammo  Counseled patient on health concerns:  Stressors, blood pressure, thyroid, asthma plan  Relevant handouts given and discussed with patient. Immunizations noted. Advised pt to discuss Shingrix vaccine with insurance company and local pharmacy. Offered empathy, support, legitimation, prayers, partnership to patient. Praised patient for progress. ACP handout given today. Declines honoring choices referral.   Follow-up and Dispositions    · Return in about 6 months (around 1/2/2020) for blood pressure, results, referral follow up. Patient was offered a choice/choices in the treatment plan today. Patient expresses understanding of the plan and agrees with recommendations. More than 30 mins spent face to face with patient and more than 50% of this time spent in counseling and coordinating care.     Written by mateusz Mejia, as dictated by Dr. José Antonio Harris, DO.    Documentation True and Accepted by Zurdo Barrientos. Stephon Mendoza. There are no Patient Instructions on file for this visit.

## 2019-07-31 PROBLEM — E66.01 SEVERE OBESITY (HCC): Status: ACTIVE | Noted: 2019-07-31

## 2019-08-07 ENCOUNTER — HOSPITAL ENCOUNTER (OUTPATIENT)
Dept: MAMMOGRAPHY | Age: 64
Discharge: HOME | End: 2019-08-07
Attending: FAMILY MEDICINE
Payer: COMMERCIAL

## 2019-08-07 DIAGNOSIS — Z12.39 BREAST CANCER SCREENING: ICD-10-CM

## 2019-08-07 PROCEDURE — 77063 BREAST TOMOSYNTHESIS BI: CPT

## 2020-01-07 ENCOUNTER — OFFICE VISIT (OUTPATIENT)
Dept: FAMILY MEDICINE CLINIC | Age: 65
End: 2020-01-07

## 2020-01-07 VITALS
BODY MASS INDEX: 35.85 KG/M2 | HEIGHT: 64 IN | RESPIRATION RATE: 16 BRPM | HEART RATE: 61 BPM | SYSTOLIC BLOOD PRESSURE: 134 MMHG | OXYGEN SATURATION: 97 % | TEMPERATURE: 98 F | WEIGHT: 210 LBS | DIASTOLIC BLOOD PRESSURE: 84 MMHG

## 2020-01-07 DIAGNOSIS — K63.5 POLYP OF ASCENDING COLON, UNSPECIFIED TYPE: ICD-10-CM

## 2020-01-07 DIAGNOSIS — Z96.642 HISTORY OF TOTAL LEFT HIP REPLACEMENT: ICD-10-CM

## 2020-01-07 DIAGNOSIS — R79.89 ELEVATED SERUM CREATININE: ICD-10-CM

## 2020-01-07 DIAGNOSIS — Z82.62 FAMILY HISTORY OF OSTEOPOROSIS IN MOTHER: ICD-10-CM

## 2020-01-07 DIAGNOSIS — J30.9 ALLERGIC CONJUNCTIVITIS AND RHINITIS, BILATERAL: ICD-10-CM

## 2020-01-07 DIAGNOSIS — G89.29 CHRONIC PAIN OF BOTH SHOULDERS: ICD-10-CM

## 2020-01-07 DIAGNOSIS — E03.4 HYPOTHYROIDISM DUE TO ACQUIRED ATROPHY OF THYROID: Primary | ICD-10-CM

## 2020-01-07 DIAGNOSIS — M25.512 CHRONIC PAIN OF BOTH SHOULDERS: ICD-10-CM

## 2020-01-07 DIAGNOSIS — E55.9 VITAMIN D DEFICIENCY: ICD-10-CM

## 2020-01-07 DIAGNOSIS — H10.13 ALLERGIC CONJUNCTIVITIS AND RHINITIS, BILATERAL: ICD-10-CM

## 2020-01-07 DIAGNOSIS — B37.0 ORAL CANDIDIASIS: ICD-10-CM

## 2020-01-07 DIAGNOSIS — R73.9 HYPERGLYCEMIA: ICD-10-CM

## 2020-01-07 DIAGNOSIS — Z84.89 FAMILY HISTORY OF CARCINOMA IN SITU OF ANAL CANAL: ICD-10-CM

## 2020-01-07 DIAGNOSIS — E78.5 DYSLIPIDEMIA: ICD-10-CM

## 2020-01-07 DIAGNOSIS — Z23 ENCOUNTER FOR IMMUNIZATION: ICD-10-CM

## 2020-01-07 DIAGNOSIS — Z80.0 FAMILY HISTORY OF COLON CANCER: ICD-10-CM

## 2020-01-07 DIAGNOSIS — M25.511 CHRONIC PAIN OF BOTH SHOULDERS: ICD-10-CM

## 2020-01-07 DIAGNOSIS — F43.23 ADJUSTMENT DISORDER WITH MIXED ANXIETY AND DEPRESSED MOOD: ICD-10-CM

## 2020-01-07 DIAGNOSIS — J45.21 MILD INTERMITTENT ASTHMA WITH ACUTE EXACERBATION: ICD-10-CM

## 2020-01-07 DIAGNOSIS — K57.30 PANCOLONIC DIVERTICULOSIS: ICD-10-CM

## 2020-01-07 RX ORDER — LEVOTHYROXINE SODIUM 100 UG/1
TABLET ORAL
Qty: 35 TAB | Refills: 11 | Status: SHIPPED | OUTPATIENT
Start: 2020-01-07 | End: 2020-09-08 | Stop reason: ALTCHOICE

## 2020-01-07 RX ORDER — PRAVASTATIN SODIUM 20 MG/1
TABLET ORAL
Qty: 90 TAB | Refills: 3 | Status: SHIPPED | OUTPATIENT
Start: 2020-01-07 | End: 2020-09-08 | Stop reason: ALTCHOICE

## 2020-01-07 RX ORDER — DICLOFENAC SODIUM 10 MG/G
GEL TOPICAL
COMMUNITY
Start: 2019-10-01 | End: 2021-04-13

## 2020-01-07 RX ORDER — NYSTATIN 500000 [USP'U]/1
1 TABLET, COATED ORAL
COMMUNITY
End: 2020-01-07 | Stop reason: ALTCHOICE

## 2020-01-07 RX ORDER — NYSTATIN 100000 [USP'U]/ML
500000 SUSPENSION ORAL 4 TIMES DAILY
Qty: 473 ML | Refills: 0 | Status: SHIPPED | OUTPATIENT
Start: 2020-01-07 | End: 2020-09-08 | Stop reason: SDUPTHER

## 2020-01-07 RX ORDER — METAPROTERENOL SULFATE 20 MG
TABLET ORAL
COMMUNITY

## 2020-01-07 RX ORDER — CETIRIZINE HCL 10 MG
10 TABLET ORAL
COMMUNITY

## 2020-01-07 NOTE — PROGRESS NOTES
Identified pt with two pt identifiers(name and ). Reviewed record in preparation for visit and have obtained necessary documentation. Chief Complaint   Patient presents with    Results    Referral Follow Up        Health Maintenance Due   Topic    Pneumococcal 0-64 years (1 of 1 - PPSV23)    Shingrix Vaccine Age 50> (1 of 2)    PAP AKA CERVICAL CYTOLOGY     Influenza Age 5 to Adult         Visit Vitals  /84 (BP 1 Location: Right arm, BP Patient Position: Sitting)   Pulse 61   Temp 98 °F (36.7 °C) (Oral)   Resp 16   Ht 5' 4\" (1.626 m)   Wt 210 lb (95.3 kg)   SpO2 97%   BMI 36.05 kg/m²     Pain Scale: 4/10    Coordination of Care Questionnaire:  :   1. Have you been to the ER, urgent care clinic since your last visit? Hospitalized since your last visit? No    2. Have you seen or consulted any other health care providers outside of the [address] [place] [address] since your last visit? Include any pap smears or colon screening.  No

## 2020-01-07 NOTE — Clinical Note
Some strange things happened with closing encounters. I have already closed this note and now it is reappearing. This has happened for 3 other charts. A few charts also had duplicate encounters. Not sure why. Perhaps it had something to do with computer lagging this week.

## 2020-01-07 NOTE — PROGRESS NOTES
HISTORY OF PRESENT ILLNESS  Roya Mckinney is a 62 y.o. female presents with Results; Referral Follow Up; and Medication Refill    Agree with nurse note. Pt with hypothyroidism, dyslipidemia, vit d deficiency, hyperglycemia, elevated serum Cr, hx of total L hip replacement, and family hx of osteoporosis presents to the office with a BP of 134/84. She takes Pravachol 20 mg qPM for cholesterol; tolerating well. She takes Synthroid 100 mcg six days a week and 2 tabs on Sunday for thyroid; tolerating well. Pt with mild intermittent asthma uses Advair Diskus 500-50 daily, Ventolin hfa prn, and duo-neb prn. She uses Singulair 10 mg qPM and Nasacort daily for allergies. Pt with chronic cough used Tessalon Perles, Mucinex, and Emergen-C to avoid using her emergency inhaler the last few weeks. She took Zyrtec for four days, which helped the cough. She admits to itchy, watery eyes and itchy nose. She wants to avoid getting bronchitis because the prednisone worsens her shoulder pain. She last saw Dr. Ketty Gutierrez in 7/2019. Chest XR identified a nodule in right upper lobe. Were planning to repeat a Chest XR. Pt saw Dr. Peter Alexander on 10/1/2019 for BL shoulder pain (R>L). Pain worsens with lifting the shoulder above the head. Pt had an injection in the R shoulder on 7/11/2019 and in the L shoulder on 8/22/2019. MRI R shoulder showed several degenerative changes in the joint, a 5 mm loose body in the subscapularis, and irritation in the tendons. Dx'd with incomplete tear of R rotator cuff and pain of BL shoulders. Rx'd Voltaren gel. Pt notes the L shoulder also has arthritis and a bone spur. She shares the shoulder pain worsens when her arms are hanging at her side. It helps when her arms are folded across her chest.     Pt with obesity is curious about weight loss. Stressors: She has her three grandchildren and her son living with her the last 21 months. She works full time.  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 Pt with adjustment disorder with mixed anxiety and depressed mood takes Zoloft 25 mg daily. No SI/HI. She prefers to keep the script for 50 mg to last the year. Health Maintenance    Pt with family hx of colon ca and anal ca. Pt's most recent colonoscopy with polypectomy was on 10/5/2018 with Dr. Lito Hdz. F/U 5 years. Dx'd with diverticulosis. Pt has not had a recent pap smear but is agreeable scheduling an appointment with [facility]. Pt's most recent mammogram was on 8/7/2019. Negative. F/U 1 year. Written by mateusz Parsons, as dictated by Dr. Nancy Cesar DO.    ROS    Review of Systems negative except as noted above in HPI. ALLERGIES:    Allergies   Allergen Reactions    Aspirin Other (comments)     Triggers asthma & causes chest tightness but takes 325 mg \"once in a while\"    Lipitor [Atorvastatin] Myalgia     Elevated CPK    Sulfa (Sulfonamide Antibiotics) Rash    Erythromycin Nausea and Vomiting    Penicillins Nausea and Vomiting     Many years ago       CURRENT MEDICATIONS:    Outpatient Medications Marked as Taking for the 1/7/20 encounter (Office Visit) with Karolina Cleary DO   Medication Sig Dispense Refill    glucosamine/chondro jean-baptiste A/C/Mn (GLUCOSAMINE-CHONDROITIN COMPLX PO) Take  by mouth.  diclofenac (VOLTAREN) 1 % gel Apply to affected area as directed.  Fgvcukwa-Cswk-Oyorcj-Hyalur Ac 233-177-29-2 mg cap Take  by mouth.  cetirizine (ZYRTEC) 10 mg tablet Take  by mouth.  pravastatin (PRAVACHOL) 20 mg tablet TAKE 1 TABLET BY MOUTH EVERY EVENING  Indications: high cholesterol and high triglycerides 90 Tab 3    levothyroxine (SYNTHROID) 100 mcg tablet TAKE 1 TABLET DAILY BEFORE BREAKFAST.  TAKE 2 ON SUNDAYS  Indications: a condition with low thyroid hormone levels 35 Tab 11    nystatin (MYCOSTATIN) 100,000 unit/mL suspension Take 5 mL by mouth four (4) times daily. swish and spit  Indications: thrush 473 mL 0    sertraline (ZOLOFT) 50 mg tablet TAKE 1 TABLET BY MOUTH DAILY. (Patient taking differently: Take 25 mg by mouth daily.) 30 Tab 5    montelukast (SINGULAIR) 10 mg tablet TAKE 1 TABLET BY MOUTH DAILY FOR ALLERGIES AND ASTHMA. 30 Tab 11    ADVAIR DISKUS 500-50 mcg/dose diskus inhaler 1 PUFF TWICE A DAY 1 Each 11    cholecalciferol, VITAMIN D3, (VITAMIN D3) 5,000 unit tab tablet Take  by mouth daily.  multivitamin (ONE A DAY) tablet Take 1 Tab by mouth daily.  albuterol-ipratropium (DUO-NEB) 2.5 mg-0.5 mg/3 ml nebulizer solution 3 mL by Nebulization route every six (6) hours. 30 Vial 1    Nebulizer & Compressor machine 1 each by Does Not Apply route daily. 1 each 0    PROAIR HFA 90 mcg/actuation inhaler INHALE 1-2 PUFFS EVERY 4-6 HOURS AS NEEDED 1 Inhaler 5       PAST MEDICAL HISTORY:    Past Medical History:   Diagnosis Date    Adjustment disorder 08/2012    Dr. Jorge Castillo. Radha Bunch, counselor    Allergic rhinitis 2016    Dr. Ping Lerma state, unspecified 01/25/07    Asthma 1980    Dr. Blank Early, pulm. [address] bertha inhalation.  Bilateral shoulder pain 2019    R partial RCT, OA GH and AC joint. Dr. Benedict Kennedy Chickenpox childhood    Colon polyp     Dr. Darlene Castillo Cutaneous wart 05/2015    Right thumb. [facility]. Dr. Malia Huynh.  Depression     Digital mucous cyst 05/2015    Left IP joint. Dr. Aaorn Jacinto.  Diverticulosis 10/2018    pandiverticulosis. Dr. Delroy Owens    DJD (degenerative joint disease) of hip 09/19/12    Dr. Delio Thomas    Eczema 2016    Dr. Lien Quintana liver 2006    Granulomatous lung disease ([address] 2017    stable. Dr. Chris Beth.  Hearing aid worn 09/2014    Bilateral ears.  Hematuria 92/4335    Hepatitis B immune March 2014    Prior vaccine--[id number].  High cholesterol 1976    Hypothyroid 10/16/2006    Incidental pulmonary nodule 2019    RUL. Dr. Stacey Meléndez.  Knee pain     having surgery on 2/12/13 left    Left hip pain 2013    Dr. Carl Santiago Measles childhood    Menopause 11/2006    Mumps childhood    Ovarian cyst 02/21/2008    Left and Rt . Mrs. Antonella Waters    Pneumonia 1981    x 2    PONV (postoperative nausea and vomiting)     Scarlet fever 1971    Uterine fibroid 02/21/08    Jazmyn Fox, Midwife       PAST SURGICAL HISTORY:    Past Surgical History:   Procedure Laterality Date    COLONOSCOPY N/A 10/5/2018    Dr. Rain Cuellar MD at [facility]. due q 5 yrs.  HX COLONOSCOPY  04/07/08    Dr. Lito Hdz. due q 5 yrs.  HX HIP REPLACEMENT Left 02/12/13    Left.  with Anterior approach. Dr. Edgardo Granados.  HX ORTHOPAEDIC Left 05/18/15    Thumb. IP joint MUCOUS CYST REMOVED. Dr. Carmine Parker.  HX ORTHOPAEDIC Right 05/18/2015    Thumb. Dr. Lucy Douglass.  HX TONSILLECTOMY  1962       FAMILY HISTORY:    Family History   Problem Relation Age of Onset   [facility] Cancer Mother         Squamous Cell Anal CA with mets (including skin)    High Cholesterol Mother     Osteoporosis Mother     Colon Cancer Father     Hypertension Father     Cancer Maternal Grandmother         cervical    Stroke Maternal Grandmother         TIAs    Other Maternal Grandmother         multiple blood clots    Osteoporosis Maternal Grandmother     Arthritis-osteo Maternal Grandmother     Dementia Maternal Grandmother         Alzheimers    Diabetes Paternal Grandfather        SOCIAL HISTORY:    Social History     Socioeconomic History    Marital status:      Spouse name: Not on file    Number of children: Not on file    Years of education: Not on file    Highest education level: Not on file   Tobacco Use    Smoking status: Passive Smoke Exposure - Never Smoker    Smokeless tobacco: Never Used    Tobacco comment: grew up with smoker parents x 20 yrs   Substance and Sexual Activity    Alcohol use:  Yes Comment: one beer every few months    Drug use: No    Sexual activity: Never     Partners: Female     Birth control/protection: Abstinence       IMMUNIZATIONS:    Immunization History   Administered Date(s) Administered    (RETIRED) Pneumococcal Vaccine (Unspecified Type) 12/01/1998    Hep A Vaccine (Adult) 03/05/2014    Hepatitis B Vaccine 10/14/2010, 11/14/2010, 04/29/2011    Influenza Vaccine 10/25/2016    Influenza Vaccine (Quad) 01/15/2016    Influenza Vaccine (Quad) PF 01/07/2020    Influenza Vaccine Split 11/22/2011, 10/09/2012    Influenza Vaccine Whole 10/14/2010    TD Vaccine 10/01/1999    TDAP Vaccine 10/18/2010         PHYSICAL EXAMINATION    Vital Signs    Visit Vitals  /84 (BP 1 Location: Right arm, BP Patient Position: Sitting)   Pulse 61   Temp 98 °F (36.7 °C) (Oral)   Resp 16   Ht 5' 4\" (1.626 m)   Wt 210 lb (95.3 kg)   SpO2 97%   BMI 36.05 kg/m²       Weight Metrics 1/7/2020 7/2/2019 10/5/2018 8/21/2018 2/22/2018 8/22/2017 2/21/2017   Weight 210 lb 211 lb 1.6 oz 207 lb 6 oz 210 lb 3.2 oz 212 lb 11.2 oz 205 lb 14.4 oz 205 lb 3.2 oz   BMI 36.05 kg/m2 36.24 kg/m2 35.6 kg/m2 35.23 kg/m2 33.31 kg/m2 32.25 kg/m2 32.14 kg/m2       General appearance - Well nourished. Well appearing. Well developed. No acute distress. Obese. Head - Normocephalic. Atraumatic. Eyes - pupils equal and reactive. Extraocular eye movements intact. Sclera anicteric. Mildly injected sclera. Ears - Hearing is grossly normal bilaterally. Nose - normal and patent. No polyps noted. No erythema. No discharge. Mouth - mucous membranes with adequate moisture. Posterior pharynx normal with cobblestone appearance. No erythema, white exudate or obstruction. White coating on tongue. Neck - supple. Midline trachea. No carotid bruits noted bilaterally. No thyromegaly noted. Chest - clear to auscultation bilaterally anteriorly and posteriorly. No wheezes. No rales or rhonchi.   Breath sounds are symmetrical bilaterally. Unlabored respirations. Heart - normal rate. Regular rhythm. Normal S1, S2. No murmur noted. No rubs, clicks or gallops noted. Abdomen - soft and distended. No masses or organomegaly. No rebound, rigidity or guarding. Bowel sounds normal x 4 quadrants. No tenderness noted. Neurological - awake, alert and oriented to person, place, and time and event. Cranial nerves II through XII intact. Clear speech. Muscle strength is +5/5 x 4 extremities. Sensation is intact to light touch bilaterally. Steady gait. Heme/Lymph - peripheral pulses normal x 4 extremities. No peripheral edema is noted. Musculoskeletal - Intact x 4 extremities. Full ROM x 2 extremities. No pain with movement. Pain with abduction above 80 degrees in RUE. Back exam - normal range of motion. No pain on palpation of the spinous processes in the cervical, thoracic, lumbar, sacral regions. No CVA tenderness. Increased muscle tension throughout. Skin - no rashes, erythema, ecchymosis, lacerations, abrasions, suspicious moles noted  Psychological -   normal behavior, dress and thought processes. Good insight. Good eye contact. Normal affect. Appropriate mood. Normal speech.       DATA REVIEWED    Lab Results   Component Value Date/Time    WBC 5.7 03/24/2017 08:38 AM    Hemoglobin (POC) 11.3 (A) 02/12/2013 03:40 PM    HGB 14.1 03/24/2017 08:38 AM    HCT 42.3 03/24/2017 08:38 AM    PLATELET 634 17/75/9725 08:38 AM    MCV 86 03/24/2017 08:38 AM     Lab Results   Component Value Date/Time    Sodium 144 11/29/2018 09:02 AM    Potassium 4.6 11/29/2018 09:02 AM    Chloride 106 11/29/2018 09:02 AM    CO2 27 11/29/2018 09:02 AM    Anion gap 10 01/26/2016 06:16 PM    Glucose 93 11/29/2018 09:02 AM    BUN 23 11/29/2018 09:02 AM    Creatinine 1.01 (H) 11/29/2018 09:02 AM    BUN/Creatinine ratio 23 11/29/2018 09:02 AM    GFR est AA 69 11/29/2018 09:02 AM    GFR est non-AA 60 11/29/2018 09:02 AM    Calcium 9.1 11/29/2018 09:02 AM    Bilirubin, total 0.4 11/29/2018 09:02 AM    AST (SGOT) 17 11/29/2018 09:02 AM    Alk. phosphatase 64 11/29/2018 09:02 AM    Protein, total 6.0 11/29/2018 09:02 AM    Albumin 4.5 11/29/2018 09:02 AM    Globulin 3.8 01/26/2016 06:16 PM    A-G Ratio 3.0 (H) 11/29/2018 09:02 AM    ALT (SGPT) 20 11/29/2018 09:02 AM     Lab Results   Component Value Date/Time    Cholesterol, total 196 11/29/2018 09:02 AM    HDL Cholesterol 52 11/29/2018 09:02 AM    LDL, calculated 117 (H) 11/29/2018 09:02 AM    VLDL, calculated 27 11/29/2018 09:02 AM    Triglyceride 133 11/29/2018 09:02 AM    CHOL/HDL Ratio 7.3 (H) 02/04/2010 11:40 AM     Lab Results   Component Value Date/Time    Vitamin D 25-Hydroxy 43.8 06/21/2011 08:00 AM    VITAMIN D, 25-HYDROXY 51.1 11/29/2018 09:02 AM       Lab Results   Component Value Date/Time    Hemoglobin A1c 5.5 11/29/2018 09:02 AM     Lab Results   Component Value Date/Time    TSH 0.722 11/29/2018 09:02 AM       ASSESSMENT and PLAN      ICD-10-CM ICD-9-CM    1. Hypothyroidism due to acquired atrophy of thyroid E03.4 244.8 levothyroxine (SYNTHROID) 100 mcg tablet     246.8     stable on Synthroid   2. Dyslipidemia E78.5 272.4 pravastatin (PRAVACHOL) 20 mg tablet    improving    3. Chronic pain of both shoulders M25.511 719.41     G89.29 338.29     M25.512      due to R partial Rotator Cuff Tear, R GH and AC joint DJD w loose body and L OA w bone spur, R>L, minimal improvement after steroid injection and PT   4. Adjustment disorder with mixed anxiety and depressed mood F43.23 309.28     stable on Zoloft 25 mg daily   5. Mild intermittent asthma with acute exacerbation J45.21 493.92     stable on inhalers   6. Vitamin D deficiency E55.9 268.9    7. Hyperglycemia R73.9 790.29    8. Encounter for immunization Z23 V03.89 INFLUENZA VIRUS VAC QUAD,SPLIT,PRESV FREE SYRINGE IM      DE IMMUNIZ ADMIN,1 SINGLE/COMB VAC/TOXOID   9. Elevated serum creatinine R79.89 790.99    10.  Allergic conjunctivitis and rhinitis, bilateral H10.13 372.05     J30.9 477.9    11. Family history of colon cancer Z80.0 V16.0    12. Family history of carcinoma in situ of anal canal Z84.89 V19.8    13. Family history of osteoporosis in mother Z80.64 V13.80    15. History of total left hip replacement Z96.642 V43.64    15. Polyp of ascending colon, unspecified type D12.2 211.3    16. Pancolonic diverticulosis K57.30 562.10     Asymptomatic   17. Oral candidiasis B37.0 112.0 nystatin (MYCOSTATIN) 100,000 unit/mL suspension       Discussed the patient's BMI with her. The BMI follow up plan is as follows: I have counseled this patient on diet and exercise regimens. Decrease carbohydrates (white foods, sweet foods, sweet drinks and alcohol), increase green leafy vegetables and protein (lean meats and beans) with each meal.  Avoid fried foods. Eat 3-5 small meals daily. Do not skip meals. Increase water intake. Increase physical activity to 30 minutes daily for health benefit or 60 minutes daily to prevent weight regain, as tolerated. Get 7-8 hours uninterrupted sleep nightly. Chart reviewed and updated. Encouraged the pt to sign up for MyChart to be able to view results and send me any questions or concerns prior to the next visit where we will go over results in detail. Continue current medications and care. Start Mycostatin 5 ml qid for oral candidiasis. Gave the patient information about [facility]. Prescriptions written and sent to pharmacy; medication side effects discussed. Pravachol 20 mg, Synthroid 100 mcg, Mycostatin   Recheck pertinent labs today. Recent office visit notes from Dr. Valentino Massa, Dr. Jennifer Tamez reviewed. Counseled patient on health concerns:  Asthma, allergies, weight, shoulder pain, stress. Relevant handouts given and discussed with patient. Immunizations noted; Flu shot administered today. Offered empathy, support, legitimation, prayers, partnership to patient.   Praised patient for progress. Follow-up and Dispositions    · Return in about 6 months (around 7/7/2020) for results, weight. Patient was offered a choice/choices in the treatment plan today. Patient expresses understanding of the plan and agrees with recommendations. More than 40 mins spent face to face with patient and more than 50% of this time spent in counseling and coordinating care. Written by mateusz Ha, as dictated by Dr. Paula Ramirez DO. Documentation True and Accepted by Dereck Smiley. [facility]. There are no Patient Instructions on file for this visit.

## 2020-02-18 DIAGNOSIS — J45.909 UNCOMPLICATED ASTHMA, UNSPECIFIED ASTHMA SEVERITY, UNSPECIFIED WHETHER PERSISTENT: ICD-10-CM

## 2020-02-18 RX ORDER — FLUTICASONE PROPIONATE AND SALMETEROL 50; 500 UG/1; UG/1
POWDER RESPIRATORY (INHALATION)
Qty: 1 INHALER | Refills: 0 | Status: SHIPPED | OUTPATIENT
Start: 2020-02-18

## 2020-03-02 ENCOUNTER — HOSPITAL ENCOUNTER (OUTPATIENT)
Dept: GENERAL RADIOLOGY | Age: 65
Discharge: HOME | End: 2020-03-02
Payer: COMMERCIAL

## 2020-03-02 DIAGNOSIS — R05.9 COUGH: ICD-10-CM

## 2020-03-02 PROCEDURE — 71046 X-RAY EXAM CHEST 2 VIEWS: CPT

## 2020-07-31 ENCOUNTER — TELEPHONE (OUTPATIENT)
Dept: FAMILY MEDICINE CLINIC | Age: 65
End: 2020-07-31

## 2020-07-31 DIAGNOSIS — Z82.49 FAMILY HISTORY OF BLOOD CLOTS: Primary | ICD-10-CM

## 2020-08-20 LAB
25(OH)D3+25(OH)D2 SERPL-MCNC: 38.5 NG/ML (ref 30–100)
ALBUMIN SERPL-MCNC: 4.7 G/DL (ref 3.8–4.8)
ALBUMIN/GLOB SERPL: 2.5 {RATIO} (ref 1.2–2.2)
ALP SERPL-CCNC: 69 IU/L (ref 39–117)
ALT SERPL-CCNC: 26 IU/L (ref 0–32)
AST SERPL-CCNC: 24 IU/L (ref 0–40)
BILIRUB SERPL-MCNC: 0.4 MG/DL (ref 0–1.2)
BUN SERPL-MCNC: 17 MG/DL (ref 8–27)
BUN/CREAT SERPL: 18 (ref 12–28)
CALCIUM SERPL-MCNC: 9.7 MG/DL (ref 8.7–10.3)
CHLORIDE SERPL-SCNC: 101 MMOL/L (ref 96–106)
CHOLEST SERPL-MCNC: 250 MG/DL (ref 100–199)
CO2 SERPL-SCNC: 24 MMOL/L (ref 20–29)
CREAT SERPL-MCNC: 0.97 MG/DL (ref 0.57–1)
EST. AVERAGE GLUCOSE BLD GHB EST-MCNC: 111 MG/DL
GLOBULIN SER CALC-MCNC: 1.9 G/DL (ref 1.5–4.5)
GLUCOSE SERPL-MCNC: 84 MG/DL (ref 65–99)
HBA1C MFR BLD: 5.5 % (ref 4.8–5.6)
HDLC SERPL-MCNC: 54 MG/DL
INTERPRETATION, 910389: NORMAL
LDLC SERPL CALC-MCNC: 158 MG/DL (ref 0–99)
POTASSIUM SERPL-SCNC: 4.1 MMOL/L (ref 3.5–5.2)
PROT SERPL-MCNC: 6.6 G/DL (ref 6–8.5)
SODIUM SERPL-SCNC: 141 MMOL/L (ref 134–144)
T4 FREE SERPL-MCNC: 1.04 NG/DL (ref 0.82–1.77)
TRIGL SERPL-MCNC: 188 MG/DL (ref 0–149)
TSH SERPL DL<=0.005 MIU/L-ACNC: 7.03 UIU/ML (ref 0.45–4.5)
VLDLC SERPL CALC-MCNC: 38 MG/DL (ref 5–40)

## 2020-09-08 ENCOUNTER — VIRTUAL VISIT (OUTPATIENT)
Dept: FAMILY MEDICINE CLINIC | Age: 65
End: 2020-09-08
Payer: COMMERCIAL

## 2020-09-08 DIAGNOSIS — Z82.49 FAMILY HISTORY OF BLOOD CLOTS: ICD-10-CM

## 2020-09-08 DIAGNOSIS — H10.13 ALLERGIC CONJUNCTIVITIS AND RHINITIS, BILATERAL: ICD-10-CM

## 2020-09-08 DIAGNOSIS — R79.89 ELEVATED SERUM CREATININE: ICD-10-CM

## 2020-09-08 DIAGNOSIS — Z82.3 FAMILY HISTORY OF STROKE: ICD-10-CM

## 2020-09-08 DIAGNOSIS — G89.29 CHRONIC PAIN OF BOTH SHOULDERS: ICD-10-CM

## 2020-09-08 DIAGNOSIS — R73.9 HYPERGLYCEMIA: ICD-10-CM

## 2020-09-08 DIAGNOSIS — Z96.642 HISTORY OF TOTAL LEFT HIP REPLACEMENT: ICD-10-CM

## 2020-09-08 DIAGNOSIS — E55.9 VITAMIN D DEFICIENCY: ICD-10-CM

## 2020-09-08 DIAGNOSIS — J30.9 ALLERGIC CONJUNCTIVITIS AND RHINITIS, BILATERAL: ICD-10-CM

## 2020-09-08 DIAGNOSIS — J45.21 MILD INTERMITTENT ASTHMA WITH ACUTE EXACERBATION: ICD-10-CM

## 2020-09-08 DIAGNOSIS — M25.512 CHRONIC PAIN OF BOTH SHOULDERS: ICD-10-CM

## 2020-09-08 DIAGNOSIS — E78.5 DYSLIPIDEMIA: Primary | ICD-10-CM

## 2020-09-08 DIAGNOSIS — F43.23 ADJUSTMENT DISORDER WITH MIXED ANXIETY AND DEPRESSED MOOD: ICD-10-CM

## 2020-09-08 DIAGNOSIS — E03.4 HYPOTHYROIDISM DUE TO ACQUIRED ATROPHY OF THYROID: ICD-10-CM

## 2020-09-08 DIAGNOSIS — R53.83 OTHER FATIGUE: ICD-10-CM

## 2020-09-08 DIAGNOSIS — K76.0 FATTY LIVER: ICD-10-CM

## 2020-09-08 DIAGNOSIS — I49.8 PERIODIC HEART FLUTTER: ICD-10-CM

## 2020-09-08 DIAGNOSIS — B37.0 ORAL CANDIDIASIS: ICD-10-CM

## 2020-09-08 DIAGNOSIS — M25.511 CHRONIC PAIN OF BOTH SHOULDERS: ICD-10-CM

## 2020-09-08 PROCEDURE — 99214 OFFICE O/P EST MOD 30 MIN: CPT | Performed by: FAMILY MEDICINE

## 2020-09-08 RX ORDER — NYSTATIN 100000 [USP'U]/ML
500000 SUSPENSION ORAL 4 TIMES DAILY
Qty: 473 ML | Refills: 0 | Status: SHIPPED | OUTPATIENT
Start: 2020-09-08 | End: 2021-04-13

## 2020-09-08 RX ORDER — PRAVASTATIN SODIUM 40 MG/1
40 TABLET ORAL
Qty: 90 TAB | Refills: 4 | Status: SHIPPED | OUTPATIENT
Start: 2020-09-08 | End: 2021-10-14 | Stop reason: SDUPTHER

## 2020-09-08 RX ORDER — LEVOTHYROXINE SODIUM 125 UG/1
125 TABLET ORAL
Qty: 90 TAB | Refills: 4 | Status: SHIPPED | OUTPATIENT
Start: 2020-09-08 | End: 2021-04-13

## 2020-09-08 RX ORDER — SERTRALINE HYDROCHLORIDE 50 MG/1
25 TABLET, FILM COATED ORAL DAILY
Qty: 45 TAB | Refills: 3 | Status: SHIPPED | OUTPATIENT
Start: 2020-09-08 | End: 2021-10-14 | Stop reason: SDUPTHER

## 2020-09-08 RX ORDER — ACETAMINOPHEN 500 MG
1 TABLET ORAL DAILY
Qty: 1 KIT | Refills: 0 | Status: SHIPPED | OUTPATIENT
Start: 2020-09-08 | End: 2022-03-31 | Stop reason: SDUPTHER

## 2020-09-08 NOTE — PROGRESS NOTES
Addendum was created due to premature closure of this document caused by a glitch in [address] [facility] as upgrades were being implemented during Covid pandemic./ [address] [facility] [address] to the emergency declaration under the [employer] and [facility] [place], 113 waiver authority and the [employer] and [employer] Act, this Virtual Visit was conducted, with patient's consent, to reduce the patient's risk of exposure to COVID-19 and provide continuity of care for an established patient. Services were provided through a video synchronous discussion virtually to substitute for in-person appointment. Consent:  She and/or her healthcare decision maker is aware that this patient-initiated Telehealth encounter is a billable service, with coverage as determined by her insurance carrier. She is aware that she may receive a bill and has provided verbal consent to proceed: Yes    HISTORY OF PRESENT ILLNESS  Lesly El is a 60 y.o. female presents with Weight Management; Results; Referral Follow Up; Medication Refill; Palpitations (states she has been having \"heart flutters\" periodically); and Rash      Agree with nurse note. Pt with dyslipidemia, vit d deficiency, elevated serum creatinine, hyperglycemia, fatty liver, hx of total L hip replacement, and family hx of blood clots and stroke. Pt does not have a BP monitoring kit and was unable to provide a BP today. She takes pravastatin 20 mg daily for cholesterol; tolerating well. Pt wishes to discuss most recent labs. Pt with recurring oral candidiasis. She notes recurrences last for a couple of months at a time. She takes nystatin 100,000 unit/mL for 2 weeks with resolution. Refill requested today. Pt with hypothyroidism complains of fatigue and heart fluttering x 2 weeks. TSH 7.030, free T4 1.04 on 8/19/2020. She takes Synthroid 100 mcg daily Monday-Saturday and 2 pills on Sunday for thyroid.  She reports that she takes Synthroid daily along with vit d. She notes occassional fever, chills, and LYNN. She has begun to take naps after housework activites. She gets 8 hours of sleep nightly. No known Covid exposure. Pt with mild intermittent asthma with acute exacerbation and BL allergic conjunctivitis and rhinitis uses Advair Diskus 500-50 daily, Proair hfa prn, and duo-neb prn. She reports not needing duo-neb x 6 days. Previously she was using duo-neb daily x 2 weeks. She takes Singulair 10 mg qPM for allergies. For chronic cough she takes [employer] with moderate relief. She last saw her pulmonologist, Chapincito Feliciano in 7/2019. She notes that her appointment for 6/2020 was postponed due to Covid-19. Pt with chronic BL shoulder pain (R>L). She notes less range of motion in R shoulder. She uses Real Time Pain Creme Calvin Relief prn and takes Aleve with moderate relief. Pt last saw Dr. Robert James on 10/1/2019. MRI R shoulder showed several degenerative changes in the joint, a 5 mm loose body in the subscapularis, and irritation in the tendons. Dx'd with incomplete tear of R rotator cuff and pain of BL shoulders. Rx'd Voltaren gel. Pt notes the L shoulder also has arthritis and a bone spur. Pt with adjustment disorder with mixed anxiety and depressed mood takes Zoloft 25 mg daily, tolerating well. She has been working remotely since 3/2020 due to Covid pandemic. This has caused her to be more sedentary. No SI/HI. Stressors:  Her grandchildren are staying with her. They are starting school virtually today. Written by mateusz Bhatia, as dictated by Dr. Juliana Johns DO.    ROS    Review of Systems negative except as noted above in HPI.     ALLERGIES:    Allergies   Allergen Reactions    Aspirin Other (comments)     Triggers asthma & causes chest tightness but takes 325 mg \"once in a while\"    Lipitor [Atorvastatin] Myalgia     Elevated CPK    Sulfa (Sulfonamide Antibiotics) Rash    Erythromycin Nausea and Vomiting    Penicillins Nausea and Vomiting     Many years ago       CURRENT MEDICATIONS:    Outpatient Medications Marked as Taking for the 9/8/20 encounter (Virtual Visit) with Tete Arenasney, DO   Medication Sig Dispense Refill    nystatin (MYCOSTATIN) 100,000 unit/mL suspension Take 5 mL by mouth four (4) times daily. swish and spit  Indications: thrush 473 mL 0    OTHER,NON-FORMULARY, REAL TIME PAIN CREME RISSA RELIEF      Blood Pressure Monitor kit 1 Device by Does Not Apply route daily. 1 Kit 0    sertraline (ZOLOFT) 50 mg tablet Take 0.5 Tabs by mouth daily. Indications: anxiousness associated with depression 45 Tab 3    levothyroxine (SYNTHROID) 125 mcg tablet Take 1 Tab by mouth Daily (before breakfast). Indications: a condition with low thyroid hormone levels 90 Tab 4    pravastatin (PRAVACHOL) 40 mg tablet Take 1 Tab by mouth nightly. Indications: high cholesterol and high triglycerides 90 Tab 4    ADVAIR DISKUS 500-50 mcg/dose diskus inhaler INHALE 1 PUFF TWICE A DAY 1 Inhaler 0    glucosamine/chondro jean-baptiste A/C/Mn (GLUCOSAMINE-CHONDROITIN COMPLX PO) Take  by mouth.  diclofenac (VOLTAREN) 1 % gel Apply to affected area as directed.  Lihpbcno-Oula-Azosha-Hyalur Ac 773-417-80-2 mg cap Take  by mouth.  cetirizine (ZYRTEC) 10 mg tablet Take  by mouth.  montelukast (SINGULAIR) 10 mg tablet TAKE 1 TABLET BY MOUTH DAILY FOR ALLERGIES AND ASTHMA. 30 Tab 11    cholecalciferol, VITAMIN D3, (VITAMIN D3) 5,000 unit tab tablet Take  by mouth daily.  multivitamin (ONE A DAY) tablet Take 1 Tab by mouth daily.  Nebulizer & Compressor machine 1 each by Does Not Apply route daily. 1 each 0    PROAIR HFA 90 mcg/actuation inhaler INHALE 1-2 PUFFS EVERY 4-6 HOURS AS NEEDED 1 Inhaler 5       PAST MEDICAL HISTORY:    Past Medical History:   Diagnosis Date    Adjustment disorder 08/2012    Dr. Hay Leigh.   Tierra Ahumada, counselor    Allergic rhinitis 2016    Dr. Anne Marie Gasca [facility], unspecified 01/25/07    Asthma 1980    Dr. Cinthya Salvador, pulm. [address] [facility] bertha inhalation.  Bilateral shoulder pain 2019    R partial RCT, OA GH and AC joint. Dr. Dewayne Rodas Chickenpox childhood    Colon polyp     Dr. Jonn Johnston Cutaneous wart 05/2015    Right thumb. [facility]. Dr. Damian Roberts.  Depression     Digital mucous cyst 05/2015    Left IP joint. Dr. Nieves Aleman.  Diverticulosis 10/2018    pandiverticulosis. Dr. Carl Streeter    DJD (degenerative joint disease) of hip 09/19/12    Dr. Rosa Hair    Eczema 2016    Dr. Kush Kitchen liver 2006    Granulomatous lung disease ([facility] [address] 2017    stable. Dr. Corry Molina.  Hearing aid worn 09/2014    Bilateral ears.  Hematuria 41/5030    Hepatitis B immune March 2014    Prior vaccine--[id number].  High cholesterol 1976    Hypothyroid 10/16/2006    Incidental pulmonary nodule 2019    RUL. Dr. Cinthya Salvador.  Knee pain     having surgery on 2/12/13 left    Left hip pain 2013    Dr. Dewayne Rodas Measles childhood    Menopause 11/2006    Mumps childhood    Ovarian cyst 02/21/2008    Left and Rt . Mrs. Berman Bronjuliano    Pneumonia 1981    x 2    PONV (postoperative nausea and vomiting)     Scarlet fever 1971    Uterine fibroid 02/21/08    [facility]       PAST SURGICAL HISTORY:    Past Surgical History:   Procedure Laterality Date    COLONOSCOPY N/A 10/5/2018    Dr. Shayla Santos [facility]. due q 5 yrs.  HX COLONOSCOPY  04/07/08    Dr. Carl Stereter. due q 5 yrs.  HX HIP REPLACEMENT Left 02/12/13    Left.  with Anterior approach. Dr. Felton Bonilla.  HX ORTHOPAEDIC Left 05/18/15    Thumb. IP joint MUCOUS CYST REMOVED. Dr. Nieves Aleman.  HX ORTHOPAEDIC Right 05/18/2015    Thumb. Dr. Lucille Dunn.     HX TONSILLECTOMY  1962       FAMILY HISTORY:    Family History   Problem Relation Age of Onset    Cancer Mother         Squamous Cell Anal CA with mets (including skin)    High Cholesterol Mother     Osteoporosis Mother     Colon Cancer Father     Hypertension Father     Cancer Maternal Grandmother         cervical    Stroke Maternal Grandmother         TIAs    Other Maternal Grandmother         multiple blood clots    Osteoporosis Maternal Grandmother     Arthritis-osteo Maternal Grandmother     Dementia Maternal Grandmother         Alzheimers    Diabetes Paternal Grandfather        SOCIAL HISTORY:    Social History     Socioeconomic History    Marital status:      Spouse name: Not on file    Number of children: Not on file    Years of education: Not on file    Highest education level: Not on file   Tobacco Use    Smoking status: Passive Smoke Exposure - Never Smoker    Smokeless tobacco: Never Used    Tobacco comment: grew up with smoker parents x 20 yrs   Substance and Sexual Activity    Alcohol use: Yes     Comment: one beer every few months    Drug use: No    Sexual activity: Never     Partners: Female     Birth control/protection: Abstinence       IMMUNIZATIONS:    Immunization History   Administered Date(s) Administered    (RETIRED) Pneumococcal Vaccine (Unspecified Type) 12/01/1998    Hep A Vaccine (Adult) 03/05/2014    Hepatitis B Vaccine 10/14/2010, 11/14/2010, 04/29/2011    Influenza Vaccine 10/25/2016    Influenza Vaccine (Quad) 01/15/2016    Influenza Vaccine (Quad) PF 01/07/2020    Influenza Vaccine Split 11/22/2011, 10/09/2012    Influenza Vaccine Whole 10/14/2010    TD Vaccine 10/01/1999    TDAP Vaccine 10/18/2010         PHYSICAL EXAMINATION (PER VISUAL INSPECTION AND INSTRUCTIONS GIVEN TO PATIENT TO PERFORM)    Due to this being a TeleHealth encounter (During [place] [address] public health emergency), evaluation of the following organ systems was limited to:     Vital Signs  There were no vitals taken for this visit.     Weight Metrics 1/7/2020 7/2/2019 10/5/2018 8/21/2018 2/22/2018 8/22/2017 2/21/2017   Weight 210 lb 211 lb 1.6 oz 207 lb 6 oz 210 lb 3.2 oz 212 lb 11.2 oz 205 lb 14.4 oz 205 lb 3.2 oz   BMI 36.05 kg/m2 36.24 kg/m2 35.6 kg/m2 35.23 kg/m2 33.31 kg/m2 32.25 kg/m2 32.14 kg/m2       General appearance - Well nourished. Well appearing. Well developed. No acute distress. Obese. Head - Normocephalic. Atraumatic. Eyes - Extraocular eye movements intact. Sclera anicteric. Mildly injected sclera. Ears - Hearing is grossly normal bilaterally. Nose - normal and patent. No discharge. Chest - No visualized signs of difficulty breathing or respiratory distress. Heart - normal rate. Neurological - awake, alert and oriented to person, place, and time and event. Cranial nerves II through XII intact. Clear speech. Musculoskeletal - Intact x 4 extremities. No pain with movement. Psychological -   normal behavior, dress and thought processes. Good insight. Good eye contact. Normal affect. Appropriate mood. Normal speech. DATA REVIEWED    Lab Results   Component Value Date/Time    WBC 5.7 03/24/2017 08:38 AM    Hemoglobin (POC) 11.3 (A) 02/12/2013 03:40 PM    HGB 14.1 03/24/2017 08:38 AM    HCT 42.3 03/24/2017 08:38 AM    PLATELET 419 04/80/2989 08:38 AM    MCV 86 03/24/2017 08:38 AM     Lab Results   Component Value Date/Time    Sodium 141 08/19/2020 08:56 AM    Potassium 4.1 08/19/2020 08:56 AM    Chloride 101 08/19/2020 08:56 AM    CO2 24 08/19/2020 08:56 AM    Anion gap 10 01/26/2016 06:16 PM    Glucose 84 08/19/2020 08:56 AM    BUN 17 08/19/2020 08:56 AM    Creatinine 0.97 08/19/2020 08:56 AM    BUN/Creatinine ratio 18 08/19/2020 08:56 AM    GFR est AA 71 08/19/2020 08:56 AM    GFR est non-AA 62 08/19/2020 08:56 AM    Calcium 9.7 08/19/2020 08:56 AM    Bilirubin, total 0.4 08/19/2020 08:56 AM    Alk.  phosphatase 69 08/19/2020 08:56 AM    Protein, total 6.6 08/19/2020 08:56 AM    Albumin 4.7 08/19/2020 08:56 AM    Globulin 3.8 01/26/2016 06:16 PM A-G Ratio 2.5 (H) 08/19/2020 08:56 AM    ALT (SGPT) 26 08/19/2020 08:56 AM     Lab Results   Component Value Date/Time    Cholesterol, total 250 (H) 08/19/2020 08:56 AM    HDL Cholesterol 54 08/19/2020 08:56 AM    LDL, calculated 158 (H) 08/19/2020 08:56 AM    VLDL, calculated 38 08/19/2020 08:56 AM    Triglyceride 188 (H) 08/19/2020 08:56 AM    CHOL/HDL Ratio 7.3 (H) 02/04/2010 11:40 AM     Lab Results   Component Value Date/Time    Vitamin D 25-Hydroxy 43.8 06/21/2011 08:00 AM    VITAMIN D, 25-HYDROXY 38.5 08/19/2020 08:56 AM       Lab Results   Component Value Date/Time    Hemoglobin A1c 5.5 08/19/2020 08:56 AM     Lab Results   Component Value Date/Time    TSH 7.030 (H) 08/19/2020 08:56 AM       ASSESSMENT and PLAN      ICD-10-CM ICD-9-CM    1. Dyslipidemia  E78.5 272.4 Blood Pressure Monitor kit      pravastatin (PRAVACHOL) 40 mg tablet      LIPID PANEL      METABOLIC PANEL, COMPREHENSIVE    worse with reduced exercise and increased pasta consumption during Covid pandemic   2. Hypothyroidism due to acquired atrophy of thyroid  E03.4 244.8 levothyroxine (SYNTHROID) 125 mcg tablet     246.8 TSH 3RD GENERATION      T4, FREE    undertreated on Synthroid   3. Adjustment disorder with mixed anxiety and depressed mood  F43.23 309.28 sertraline (ZOLOFT) 50 mg tablet    stable on Zoloft 25 mg daily   4. Periodic heart flutter  I49.8 427.42 REFERRAL TO CARDIOLOGY    with SOB, increased fatigue due to thyroid vs other   5. Mild intermittent asthma with acute exacerbation  J45.21 493.92     intermittently worse during damp days   6. Oral candidiasis  B37.0 112.0 nystatin (MYCOSTATIN) 100,000 unit/mL suspension    intermittently due to Advair   7. Chronic pain of both shoulders  M25.511 719.41     G89.29 338.29     M25.512      due to BL OA, spurs and R RCT, intermittently, better with RealTime Pain Creme   8. Vitamin D deficiency  E55.9 268.9     stable   9. Elevated serum creatinine  R79.89 790.99     resolved   10. Other fatigue  R53.83 780.79 REFERRAL TO CARDIOLOGY    due to undertreated thyroid vs vit D vs Asthma vs other   11. Allergic conjunctivitis and rhinitis, bilateral  H10.13 372.05     J30.9 477.9     stable on meds   12. History of total left hip replacement  Z96.642 V43.64    13. Hyperglycemia  R73.9 790.29     resolved   14. Family history of blood clots  Z82.49 V18.3    15. Family history of stroke  Z82.3 V17.1    16. Fatty liver  K76.0 571.8        Discussed the patient's BMI with her. The BMI follow up plan is as follows: I have counseled this patient on diet and exercise regimens. Decrease carbohydrates (white foods, sweet foods, sweet drinks and alcohol), increase green leafy vegetables and protein (lean meats and beans) with each meal.  Avoid fried foods. Eat 3-5 small meals daily. Do not skip meals. Increase water intake. Increase physical activity to 30 minutes daily for health benefit or 60 minutes daily to prevent weight regain, as tolerated. Get 7-8 hours uninterrupted sleep nightly. Recommend free stand pedaling while watching television. Chart reviewed and updated. Continue current medications and care. Increase Synthroid to 125 mcg daily. Avoid taking vitamins with thyroid medication. Allow at least a 4 hour holly period between vitamins and thyroid medication. Recommend that pt be tested for Covid-19. Prescriptions written and sent to pharmacy; medication side effects discussed. Synthroid 125 mcg, pravastatin 40 mg, Zoloft 50 mg, Nystatin 100,000 unit/mL, BP monitoring kit  Lab orders mailed due to COVID-19. Most recent tests reviewed from 8/19/2020. Referrals given; patient urged to keep appointments with specialists. Cardiology  Counseled patient on health concerns:  Cough, BL shoulder pain, stress, fatigue, Covid  Relevant handouts given and discussed with patient. Immunizations noted;  Influenza, Pneumococcal, Tdap  Offered empathy, support, legitimation, prayers, partnership to patient. Praised patient for progress. Follow-up and Dispositions    · Return in about 3 months (around 12/8/2020) for results, lipids, thyroid. Encouraged the pt to sign up for MyChart to be able to view results and send me any questions or concerns prior to the next visit where we will go over results in detail. Patient was offered a choice/choices in the treatment plan today. Patient expresses understanding of the plan and agrees with recommendations. 36 mins spent face to face with patient and more than 50% of this time spent in counseling and coordinating care. Written by mateusz Freire, as dictated by Dr. Ryan Powell DO. Documentation True and Accepted by Nataliya Garcia.  Linda Jacmkan.

## 2020-09-08 NOTE — PROGRESS NOTES
Denis Guevara is a 60 y.o. female     Chief Complaint   Patient presents with    Weight Management    Results    Referral Follow Up     1. Have you been to the ER, urgent care clinic since your last visit? Hospitalized since your last visit? No    2. Have you seen or consulted any other health care providers outside of the [address] [place] [address] since your last visit? Include any pap smears or colon screening. No    Patient-Reported Vitals 9/8/2020   Patient-Reported Weight 204   Patient-Reported Height 5'4\"   Patient-Reported Pulse 72   Patient-Reported Temperature 97.6   Patient-Reported LMP N/A       Pain Scale: 0/10  Pain Location: Patient did not verbalize pain at this time, rated 0/10     Patient requesting Tessalon 200 mg for cough. States that it helps  With cough from allergies.

## 2020-11-18 ENCOUNTER — OFFICE VISIT (OUTPATIENT)
Dept: CARDIOLOGY CLINIC | Age: 65
End: 2020-11-18
Payer: COMMERCIAL

## 2020-11-18 ENCOUNTER — CLINICAL SUPPORT (OUTPATIENT)
Dept: CARDIOLOGY CLINIC | Age: 65
End: 2020-11-18
Payer: COMMERCIAL

## 2020-11-18 VITALS
BODY MASS INDEX: 36.54 KG/M2 | HEART RATE: 65 BPM | HEIGHT: 64 IN | SYSTOLIC BLOOD PRESSURE: 130 MMHG | DIASTOLIC BLOOD PRESSURE: 80 MMHG | WEIGHT: 214 LBS | RESPIRATION RATE: 14 BRPM | OXYGEN SATURATION: 98 %

## 2020-11-18 DIAGNOSIS — E78.5 DYSLIPIDEMIA: Primary | ICD-10-CM

## 2020-11-18 DIAGNOSIS — R42 DIZZINESS: ICD-10-CM

## 2020-11-18 DIAGNOSIS — R06.02 SHORTNESS OF BREATH: ICD-10-CM

## 2020-11-18 DIAGNOSIS — I49.9 IRREGULAR HEART BEAT: ICD-10-CM

## 2020-11-18 DIAGNOSIS — E78.5 DYSLIPIDEMIA: ICD-10-CM

## 2020-11-18 PROCEDURE — 93225 XTRNL ECG REC<48 HRS REC: CPT | Performed by: INTERNAL MEDICINE

## 2020-11-18 PROCEDURE — 99204 OFFICE O/P NEW MOD 45 MIN: CPT | Performed by: INTERNAL MEDICINE

## 2020-11-18 PROCEDURE — 93000 ELECTROCARDIOGRAM COMPLETE: CPT | Performed by: INTERNAL MEDICINE

## 2020-11-18 NOTE — PROGRESS NOTES
[facility]: [facility]  [phone]  Requesting/referring provider: Dr. Spring Lazo  Reason for Consult: Palpitations    HPI: Christa Holbrook, a 60y.o. 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 Not getting much exercise, walking the dog daily  Gained 20 pounds. Has 3 grankdids living with her. When she gets episodes of the palpitations she feels poorly . it takes her breath away. Some dizziness or wooziness, sometimes gets dizzy out of nowhere, while jsut sitting. Shoulder replacement in Feb, has more pain recently. Can climb a flight or few or steps without dyspnea. [place] and does fine with that. GK 13,12,9, 8     Assessment/Plan:  1. Dyslipidemia  strongly recommended further dietary interventions and consideration for statin and/or calcium score  2. Body mass index is 36.73 kg/m². Recent weight gain she is working on this  3. TSH elevation  4. Palpitations Holter to evaluate for arrhythmia as well as for sick sinus syndrome causing sudden onset dizziness at rest, look at valves and heart function  5.   Exertional shortness of breath may also be related to her recent weight gain    Social non-smoker nondrinker  Family history no early CAD  She  has a past medical history of Adjustment disorder (08/2012), Allergic rhinitis (2016), Anxiety state, unspecified (01/25/07), Asthma (1980), Bilateral shoulder pain (2019), Chickenpox (childhood), Colon polyp, Cutaneous wart (05/2015), Depression, Digital mucous cyst (05/2015), Diverticulosis (10/2018), DJD (degenerative joint disease) of hip (09/19/12), Eczema (2016), Fatty liver (2006), Granulomatous lung disease ([facility] [address] (2017), Hearing aid worn (09/2014), Hematuria (04/1988), Hepatitis B immune (March 2014), High cholesterol (1976), Hypothyroid (10/16/2006), Incidental pulmonary nodule (2019), Knee pain, Left hip pain (2013), Measles (childhood), Menopause (11/2006), Mumps (childhood), Ovarian cyst (02/21/2008), Pneumonia (1981), PONV (postoperative nausea and vomiting), Scarlet fever (1971), and Uterine fibroid (02/21/08). Cardiovascular ROS: positive for - chest pain and dyspnea on exertion  Respiratory ROS: positive for - shortness of breath  Neurological ROS: no TIA or stroke symptoms  All other systems negative except as above. PE  Vitals:    11/18/20 1441   BP: 130/80   Pulse: 65   Resp: 14   SpO2: 98%   Weight: 214 lb (97.1 kg)   Height: 5' 4\" (1.626 m)    Body mass index is 36.73 kg/m².    General appearance - alert, well appearing, and in no distress  Mental status - affect appropriate to mood  Eyes - sclera anicteric, moist mucous membranes  Neck - supple, no significant adenopathy  Lymphatics - no  lymphadenopathy  Chest - clear to auscultation, no wheezes, rales or rhonchi  Heart - normal rate, regular rhythm, normal S1, S2, no murmurs, rubs, clicks or gallops  Abdomen - soft, nontender, nondistended, no masses or organomegaly  Back exam - full range of motion, no tenderness  Neurological - cranial nerves II through XII grossly intact, no focal deficit  Musculoskeletal - no muscular tenderness noted, normal strength  Extremities - peripheral pulses normal, no pedal edema  Skin - normal coloration  no rashes    Recent Labs:  Lab Results   Component Value Date/Time    Cholesterol, total 250 (H) 08/19/2020 08:56 AM    HDL Cholesterol 54 08/19/2020 08:56 AM    LDL, calculated 158 (H) 08/19/2020 08:56 AM    Triglyceride 188 (H) 08/19/2020 08:56 AM    CHOL/HDL Ratio 7.3 (H) 02/04/2010 11:40 AM     Lab Results   Component Value Date/Time    Creatinine 0.97 08/19/2020 08:56 AM     Lab Results   Component Value Date/Time    BUN 17 08/19/2020 08:56 AM     Lab Results   Component Value Date/Time    Potassium 4.1 08/19/2020 08:56 AM     Lab Results   Component Value Date/Time    Hemoglobin A1c 5.5 08/19/2020 08:56 AM     Lab Results   Component Value Date/Time    Hemoglobin (POC) 11.3 (A) 02/12/2013 03:40 PM    HGB 14.1 03/24/2017 08:38 AM     Lab Results   Component Value Date/Time    PLATELET 964 13/81/5649 08:38 AM       Reviewed:  Past Medical History:   Diagnosis Date    Adjustment disorder 08/2012    Dr. Brown Burden. Buddy Seymour, counselor    Allergic rhinitis 2016    Dr. Kamilah Sandoval state, unspecified 01/25/07    Asthma 1980    Dr. Chayo Ordonez, pulm. [address] [facility] bertha inhalation.  Bilateral shoulder pain 2019    R partial RCT, OA GH and AC joint. Dr. Pete Jeter Chickenpox childhood    Colon polyp     Dr. Freddy Woodall Cutaneous wart 05/2015    Right thumb. Volar. Dr. Sara Livingston.  Depression     Digital mucous cyst 05/2015    Left IP joint. Dr. Antunez Hy.  Diverticulosis 10/2018    pandiverticulosis. Dr. Omar Monk    DJD (degenerative joint disease) of hip 09/19/12    Dr. Ryder July    Eczema 2016    Dr. Nilda Ann liver 2006    Granulomatous lung disease ([facility] [address] 2017    stable. Dr. Christi Cervantes.  Hearing aid worn 09/2014    Bilateral ears.  Hematuria 69/5026    Hepatitis B immune March 2014    Prior vaccine--[id number].  High cholesterol 1976    Hypothyroid 10/16/2006    Incidental pulmonary nodule 2019    RUL. Dr. Chayo Ordonez.  Knee pain     having surgery on 2/12/13 left    Left hip pain 2013    Dr. Pete Jeter Measles childhood    Menopause 11/2006    Mumps childhood    Ovarian cyst 02/21/2008    Left and Rt . Mrs. Berman Bronk    Pneumonia 1981    x 2    PONV (postoperative nausea and vomiting)     Scarlet fever 1971    Uterine fibroid 02/21/08    Colleen Cheatham     Social History     Tobacco Use   Smoking Status Passive Smoke Exposure - Never Smoker   Smokeless Tobacco Never Used   Tobacco Comment    grew up with smoker parents x 20 yrs     Social History     Substance and Sexual Activity   Alcohol Use Yes    Frequency: Monthly or less    Drinks per session: 1 or 2    Binge frequency: Never    Comment: one beer every few months     Allergies   Allergen Reactions    Aspirin Other (comments)     Triggers asthma & causes chest tightness but takes 325 mg \"once in a while\"    Lipitor [Atorvastatin] Myalgia     Elevated CPK    Sulfa (Sulfonamide Antibiotics) Rash    Erythromycin Nausea and Vomiting    Penicillins Nausea and Vomiting     Many years ago       Current Outpatient Medications   Medication Sig    nystatin (MYCOSTATIN) 100,000 unit/mL suspension Take 5 mL by mouth four (4) times daily. swish and spit  Indications: thrush    OTHER,NON-FORMULARY, REAL TIME PAIN CREME RISSA RELIEF    Blood Pressure Monitor kit 1 Device by Does Not Apply route daily.  sertraline (ZOLOFT) 50 mg tablet Take 0.5 Tabs by mouth daily. Indications: anxiousness associated with depression    levothyroxine (SYNTHROID) 125 mcg tablet Take 1 Tab by mouth Daily (before breakfast). Indications: a condition with low thyroid hormone levels    pravastatin (PRAVACHOL) 40 mg tablet Take 1 Tab by mouth nightly. Indications: high cholesterol and high triglycerides    ADVAIR DISKUS 500-50 mcg/dose diskus inhaler INHALE 1 PUFF TWICE A DAY    glucosamine/chondro jean-baptiste A/C/Mn (GLUCOSAMINE-CHONDROITIN COMPLX PO) Take  by mouth.  cetirizine (ZYRTEC) 10 mg tablet Take 10 mg by mouth daily as needed.  montelukast (SINGULAIR) 10 mg tablet TAKE 1 TABLET BY MOUTH DAILY FOR ALLERGIES AND ASTHMA.  cholecalciferol, VITAMIN D3, (VITAMIN D3) 5,000 unit tab tablet Take  by mouth daily.  multivitamin (ONE A DAY) tablet Take 1 Tab by mouth daily.  albuterol-ipratropium (DUO-NEB) 2.5 mg-0.5 mg/3 ml nebulizer solution 3 mL by Nebulization route every six (6) hours.  Nebulizer & Compressor machine 1 each by Does Not Apply route daily.     PROAIR HFA 90 mcg/actuation inhaler INHALE 1-2 PUFFS EVERY 4-6 HOURS AS NEEDED    diclofenac (VOLTAREN) 1 % gel Apply to affected area as directed.  Lptrodgg-Xadj-Kdhxfz-Hyalur Ac 882-835-27-2 mg cap Take  by mouth.  triamcinolone (NASACORT AQ) 55 mcg nasal inhaler Use 2 sprays in each nostril everyday     No current facility-administered medications for this visit.         Mitchell Joe MD  [facility] [place]  [address] [facility] [address]  [address]

## 2020-12-07 ENCOUNTER — ANCILLARY PROCEDURE (OUTPATIENT)
Dept: CARDIOLOGY CLINIC | Age: 65
End: 2020-12-07
Payer: COMMERCIAL

## 2020-12-07 VITALS — HEIGHT: 64 IN | BODY MASS INDEX: 36.54 KG/M2 | WEIGHT: 214 LBS

## 2020-12-07 DIAGNOSIS — E78.5 DYSLIPIDEMIA: ICD-10-CM

## 2020-12-07 DIAGNOSIS — I49.9 IRREGULAR HEART BEAT: ICD-10-CM

## 2020-12-07 DIAGNOSIS — R06.02 SHORTNESS OF BREATH: ICD-10-CM

## 2020-12-07 DIAGNOSIS — R42 DIZZINESS: ICD-10-CM

## 2020-12-07 PROCEDURE — 93306 TTE W/DOPPLER COMPLETE: CPT | Performed by: INTERNAL MEDICINE

## 2020-12-09 LAB
ECHO AO ASC DIAM: 2.71 CM
ECHO AO ROOT DIAM: 2.71 CM
ECHO AV AREA PEAK VELOCITY: 2.04 CM2
ECHO AV AREA VTI: 2.4 CM2
ECHO AV AREA/BSA PEAK VELOCITY: 1 CM2/M2
ECHO AV AREA/BSA VTI: 1.2 CM2/M2
ECHO AV MEAN GRADIENT: 3.82 MMHG
ECHO AV PEAK GRADIENT: 7.57 MMHG
ECHO AV PEAK VELOCITY: 137.61 CM/S
ECHO AV VTI: 25.82 CM
ECHO IVC PROX: 1.69 CM
ECHO LA AREA 4C: 17.01 CM2
ECHO LA MAJOR AXIS: 3.96 CM
ECHO LA MINOR AXIS: 1.97 CM
ECHO LA VOL 2C: 48.25 ML (ref 22–52)
ECHO LA VOL 4C: 42.24 ML (ref 22–52)
ECHO LA VOL BP: 49.44 ML (ref 22–52)
ECHO LA VOL/BSA BIPLANE: 24.56 ML/M2 (ref 16–28)
ECHO LA VOLUME INDEX A2C: 23.97 ML/M2 (ref 16–28)
ECHO LA VOLUME INDEX A4C: 20.98 ML/M2 (ref 16–28)
ECHO LV E' LATERAL VELOCITY: 7.34 CM/S
ECHO LV E' SEPTAL VELOCITY: 9.99 CM/S
ECHO LV EDV A2C: 24.01 ML
ECHO LV EDV A4C: 36.31 ML
ECHO LV EDV BP: 30.02 ML (ref 56–104)
ECHO LV EDV INDEX A4C: 18 ML/M2
ECHO LV EDV INDEX BP: 14.9 ML/M2
ECHO LV EDV NDEX A2C: 11.9 ML/M2
ECHO LV EJECTION FRACTION A2C: 75 PERCENT
ECHO LV EJECTION FRACTION A4C: 69 PERCENT
ECHO LV EJECTION FRACTION BIPLANE: 71.2 PERCENT (ref 55–100)
ECHO LV ESV A2C: 6.08 ML
ECHO LV ESV A4C: 11.41 ML
ECHO LV ESV BP: 8.65 ML (ref 19–49)
ECHO LV ESV INDEX A2C: 3 ML/M2
ECHO LV ESV INDEX A4C: 5.7 ML/M2
ECHO LV ESV INDEX BP: 4.3 ML/M2
ECHO LV INTERNAL DIMENSION DIASTOLIC: 4.81 CM (ref 3.9–5.3)
ECHO LV INTERNAL DIMENSION SYSTOLIC: 3.12 CM
ECHO LV IVSD: 1.28 CM (ref 0.6–0.9)
ECHO LV MASS 2D: 249.3 G (ref 67–162)
ECHO LV MASS INDEX 2D: 123.8 G/M2 (ref 43–95)
ECHO LV POSTERIOR WALL DIASTOLIC: 1.34 CM (ref 0.6–0.9)
ECHO LVOT DIAM: 1.85 CM
ECHO LVOT PEAK GRADIENT: 4.35 MMHG
ECHO LVOT PEAK VELOCITY: 104.31 CM/S
ECHO LVOT SV: 61.9 ML
ECHO LVOT VTI: 23.05 CM
ECHO MV A VELOCITY: 71.94 CM/S
ECHO MV E DECELERATION TIME (DT): 240.58 MS
ECHO MV E VELOCITY: 67.04 CM/S
ECHO MV E/A RATIO: 0.93
ECHO MV E/E' LATERAL: 9.13
ECHO MV E/E' RATIO (AVERAGED): 7.92
ECHO MV E/E' SEPTAL: 6.71
ECHO PV MAX VELOCITY: 112.37 CM/S
ECHO PV PEAK INSTANTANEOUS GRADIENT SYSTOLIC: 5.05 MMHG
ECHO RV TAPSE: 2.07 CM (ref 1.5–2)
LA VOL DISK BP: 46.7 ML (ref 22–52)

## 2020-12-28 ENCOUNTER — HOSPITAL ENCOUNTER (OUTPATIENT)
Dept: MAMMOGRAPHY | Age: 65
Discharge: HOME | End: 2020-12-28
Attending: FAMILY MEDICINE
Payer: COMMERCIAL

## 2020-12-28 DIAGNOSIS — Z12.31 VISIT FOR SCREENING MAMMOGRAM: ICD-10-CM

## 2020-12-28 PROCEDURE — 77063 BREAST TOMOSYNTHESIS BI: CPT

## 2021-04-12 NOTE — PROGRESS NOTES
Golda Sicard is a 73 y.o. female and presents with [facility]      Subjective:  Patient comes in today to establish care. 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 She prefers to be called Radha Thompson. Moderate intermittent asthma-stable. Follows up with pulmonary associates Dr. Sharri Ayala. On Advair daily and albuterol as needed. Denies shortness of breath, wheezing, cough. Patient followed up with cardiology Dr. Jason Giron for concerns of palpitations due to increased stress levels. Echo was done which was unremarkable. Showed normal systolic and diastolic heart function. Heart monitor did not show any arrhythmias or abnormal heart rhythms. Hypothyroidism-on replacement. Hyperlipidemia-on statin. Denies muscle cramps myalgias. Right chronic shoulder pain, following up with [address] [place] [address]. She reports she has a right total shoulder joint replacement tomorrow. She also has a history of left hip replacement 8 years back. Past Medical History:   Diagnosis Date    Adjustment disorder 08/2012    Dr. Thaddeus Smith. Gwenette Harada, counselor    Allergic rhinitis 2016    Dr. Marbin Estes state, unspecified 01/25/07    Asthma 1980    Dr. Roseline Chau, pulm. [address] bertha inhalation.  Bilateral shoulder pain 2019    R partial RCT, OA GH and AC joint. Dr. Charlene Larios Chickenpox childhood    Colon polyp     Dr. Najma Ramirez Cutaneous wart 05/2015    Right thumb. Volar. Dr. Michele Nascimento.  Depression     Digital mucous cyst 05/2015    Left IP joint. Dr. Gage Kuhn.  Diverticulosis 10/2018    pandiverticulosis. Dr. Sima Bansal    DJD (degenerative joint disease) of hip 09/19/12    Dr. Kevin Lorenzo    Eczema 2016    Dr. Chen [place] liver 2006    Granulomatous lung disease ([facility] [address] 2017    stable. Dr. Sabas Simon.  Hearing aid worn 09/2014    Bilateral ears.       Hematuria 09/3502    Hepatitis B immune March 2014    Prior vaccine--[id number].  High cholesterol 1976    Hypothyroid 10/16/2006    Incidental pulmonary nodule 2019    RUL. Dr. Roseline Chau.  Knee pain     having surgery on 2/12/13 left    Left hip pain 2013    Dr. Charlene Larios Measles childhood    Menopause 11/2006    Mumps childhood    Ovarian cyst 02/21/2008    Left and Rt . Mrs. Antonella Waters    Pneumonia 1981    x 2    PONV (postoperative nausea and vomiting)     Scarlet fever 1971    Uterine fibroid 02/21/08    Keisha Wilson, Midwife     Past Surgical History:   Procedure Laterality Date    COLONOSCOPY N/A 10/5/2018    Dr. Tomas Coffey [facility]note 3[facility]. due q 5 yrs.  HX COLONOSCOPY  04/07/08    Dr. Sima Bansal. due q 5 yrs.  HX HIP REPLACEMENT Left 02/12/13    Left.  with Anterior approach. Dr. Paul Stafford.  HX ORTHOPAEDIC Left 05/18/15    Thumb. IP joint MUCOUS CYST REMOVED. Dr. Gage Kuhn.  HX ORTHOPAEDIC Right 05/18/2015    Thumb. Dr. Alan Parkinson.  HX TONSILLECTOMY  1962     Allergies   Allergen Reactions    Aspirin Other (comments)     Triggers asthma & causes chest tightness but takes 325 mg \"once in a while\"    Lipitor [Atorvastatin] Myalgia     Elevated CPK    Sulfa (Sulfonamide Antibiotics) Rash    Erythromycin Nausea and Vomiting    Penicillins Nausea and Vomiting     Many years ago     Current Outpatient Medications   Medication Sig Dispense Refill    Blood Pressure Monitor kit 1 Device by Does Not Apply route daily. 1 Kit 0    sertraline (ZOLOFT) 50 mg tablet Take 0.5 Tabs by mouth daily. Indications: anxiousness associated with depression 45 Tab 3    levothyroxine (SYNTHROID) 125 mcg tablet Take 1 Tab by mouth Daily (before breakfast). Indications: a condition with low thyroid hormone levels 90 Tab 4    pravastatin (PRAVACHOL) 40 mg tablet Take 1 Tab by mouth nightly.  Indications: high cholesterol and high triglycerides 90 Tab 4    ADVAIR DISKUS 500-50 mcg/dose diskus inhaler INHALE 1 PUFF TWICE A DAY 1 Inhaler 0    Dbtyrdew-Lfuq-Pxvcif-Hyalur Ac 675-858-61-2 mg cap Take  by mouth.  cetirizine (ZYRTEC) 10 mg tablet Take 10 mg by mouth daily as needed.  montelukast (SINGULAIR) 10 mg tablet TAKE 1 TABLET BY MOUTH DAILY FOR ALLERGIES AND ASTHMA. 30 Tab 11    cholecalciferol, VITAMIN D3, (VITAMIN D3) 5,000 unit tab tablet Take  by mouth daily.  multivitamin (ONE A DAY) tablet Take 1 Tab by mouth daily.  albuterol-ipratropium (DUO-NEB) 2.5 mg-0.5 mg/3 ml nebulizer solution 3 mL by Nebulization route every six (6) hours. 30 Vial 1    Nebulizer & Compressor machine 1 each by Does Not Apply route daily.  1 each 0    PROAIR HFA 90 mcg/actuation inhaler INHALE 1-2 PUFFS EVERY 4-6 HOURS AS NEEDED 1 Inhaler 5     Social History     Socioeconomic History    Marital status:      Spouse name: Not on file    Number of children: Not on file    Years of education: Not on file    Highest education level: Not on file   Tobacco Use    Smoking status: Passive Smoke Exposure - Never Smoker    Smokeless tobacco: Never Used    Tobacco comment: grew up with smoker parents x 20 yrs   Substance and Sexual Activity    Alcohol use: Yes     Frequency: Monthly or less     Drinks per session: 1 or 2     Binge frequency: Never     Comment: one beer every few months    Drug use: No    Sexual activity: Never     Partners: Female     Birth control/protection: Abstinence     Family History   Problem Relation Age of Onset    Cancer Mother         Squamous Cell Anal CA with mets (including skin)    High Cholesterol Mother     Osteoporosis Mother     Colon Cancer Father     Hypertension Father     Cancer Maternal Grandmother         cervical    Stroke Maternal Grandmother         TIAs    Other Maternal Grandmother         multiple blood clots    Osteoporosis Maternal Grandmother     Arthritis-osteo Maternal Grandmother     Dementia Maternal Grandmother         Alzheimers    Diabetes Paternal Grandfather        Review of Systems  A ten system review of constitutional, cardiovascular, respiratory, musculoskeletal, endocrine, skin, SHEENT, genitourinary, psychiatric and neurologic systems was obtained and is unremarkable with the exception of right shoulder pain. Objective:  Visit Vitals  /80 (BP 1 Location: Left upper arm, BP Patient Position: Sitting, BP Cuff Size: Adult)   Pulse 71   Temp 98.4 °F (36.9 °C)   Resp 14   Ht 5' 4\" (1.626 m)   Wt 218 lb (98.9 kg)   SpO2 97%   BMI 37.42 kg/m²     Physical Exam:   General appearance - alert, well appearing, and in no distress  Mental status - alert, oriented to person, place, and time  EYE-RUPA, EOMI, fundi normal, corneas normal, no foreign bodies  ENT-ENT exam normal, no neck nodes or sinus tenderness  Nose - normal and patent, no erythema, discharge or polyps  Mouth - mucous membranes moist, pharynx normal without lesions  Neck - supple, no significant adenopathy   Chest - clear to auscultation, no wheezes, rales or rhonchi, symmetric air entry   Heart - normal rate, regular rhythm, normal S1, S2, no murmurs, rubs, clicks or gallops   Abdomen - soft, nontender, nondistended, no masses or organomegaly  Lymph- no adenopathy palpable  Ext-peripheral pulses normal, no pedal edema, no clubbing or cyanosis  Skin-Warm and dry.  no hyperpigmentation, vitiligo, or suspicious lesions  Neuro -alert, oriented, normal speech, no focal findings or movement disorder noted  Musculoskeletal- FROM, no bony abnormalities, no point tenderness    Lab Review:  Results for orders placed or performed in visit on 12/07/20   ECHO ADULT COMPLETE   Result Value Ref Range    IVSd 1.28 (A) 0.60 - 0.90 cm    LVIDd 4.81 3.90 - 5.30 cm    LVIDs 3.12 cm    LVOT d 1.85 cm    LVPWd 1.34 (A) 0.60 - 0.90 cm    BP EF 71.2 55.0 - 100.0 percent    LV Ejection Fraction MOD 2C 75 percent    LV Ejection Fraction MOD 4C 69 percent    LV ED Vol A2C 24.01 mL    LV ED Vol A4C 36.31 mL    LV ED Vol BP 30.02 (A) 56.0 - 104.0 mL    LV ES Vol A2C 6.08 mL    LV ES Vol A4C 11.41 mL    LV ES Vol BP 8.65 (A) 19.0 - 49.0 mL    LVOT Peak Gradient 4.35 mmHg    LVOT SV 61.9 mL    LVOT Peak Velocity 104.31 cm/s    LVOT VTI 23.05 cm    Left Atrium Major Axis 3.96 cm    LA Volume 49.44 22.0 - 52.0 mL    LA Area 4C 17.01 cm2    LA Vol 2C 48.25 22.00 - 52.00 mL    LA Vol 4C 42.24 22.00 - 52.00 mL    LA Volume DISK BP 46.70 22.0 - 52.0 mL    Aortic Valve Area by Continuity of Peak Velocity 2.04 cm2    Aortic Valve Area by Continuity of VTI 2.40 cm2    AoV PG 7.57 mmHg    Aortic Valve Systolic Mean Gradient 2.97 mmHg    Aortic Valve Systolic Peak Velocity 240.43 cm/s    AoV VTI 25.82 cm    MV A Twan 71.94 cm/s    Mitral Valve E Wave Deceleration Time 240.58 ms    MV E Twan 67.04 cm/s    E/E' lateral 9.13     E/E' septal 6.71     LV E' Lateral Velocity 7.34 cm/s    LV E' Septal Velocity 9.99 cm/s    Pulmonic Valve Systolic Peak Instantaneous Gradient 5.05 mmHg    Pulmonic Valve Max Velocity 112.37 cm/s    Tapse 2.07 (A) 1.50 - 2.00 cm    AO ASC D 2.71 cm    Ao Root D 2.71 cm    IVC proximal 1.69 cm    MV E/A 0.93     LV Mass .3 67.0 - 162.0 g    LV Mass AL Index 123.8 43.0 - 95.0 g/m2    E/E' ratio (averaged) 7.92     LVES Vol Index BP 4.3 mL/m2    LVED Vol Index BP 14.9 mL/m2    Left Atrium Minor Axis 1.97 cm    LA Vol Index 24.56 16.00 - 28.00 ml/m2    LA Vol Index 23.97 16.00 - 28.00 ml/m2    LA Vol Index 20.98 16.00 - 28.00 ml/m2    LVED Vol Index A4C 18.0 mL/m2    LVED Vol Index A2C 11.9 mL/m2    LVES Vol Index A4C 5.7 mL/m2    LVES Vol Index A2C 3.0 mL/m2    ANDREW/BSA Pk Twan 1.0 cm2/m2    ANDREW/BSA VTI 1.2 cm2/m2        Documenation Review:    Assessment/Plan:    Diagnoses and all orders for this visit:    1. Dyslipidemia  -     LIPID PANEL  -     METABOLIC PANEL, COMPREHENSIVE  -     CBC W/O DIFF; Future    2. Obesity, Class I, BMI 30-34.9    3. [facility]    4. Hypothyroidism due to acquired atrophy of thyroid  -     TSH 3RD GENERATION  -     T4, FREE    5. Moderate persistent asthma without complication    6. Anxious depression    7. Impaired glucose tolerance  -     HEMOGLOBIN A1C W/O EAG        CPE in 6 months  Noted patient has right shoulder surgery/joint replacement on Wednesday. Continue current meds. I will call with lab results and make further recommendations or adjustments if necessary. Discussed lifestyle modifications including Na restriction, low carb/fat diet, weight reduction and exercise (at least a walking program). ICD-10-CM ICD-9-CM    1. Dyslipidemia  E78.5 272.4 LIPID PANEL      METABOLIC PANEL, COMPREHENSIVE      CBC W/O DIFF      CBC W/O DIFF   2. Obesity, Class I, BMI 30-34.9  E66.9 278.00    3. Major depression in complete remission (HCC)  F32.5 296.26    4. Hypothyroidism due to acquired atrophy of thyroid  E03.4 244.8 TSH 3RD GENERATION     246.8 T4, FREE   5. Moderate persistent asthma without complication  Y70.93 872.99    6. Anxious depression  F41.8 300.4    7. Impaired glucose tolerance  R73.02 790.22 HEMOGLOBIN A1C W/O EAG           Follow-up and Dispositions    · Return in about 6 months (around 10/13/2021) for CPE-30mins. I have reviewed with the patient details of the assessment and plan and all questions were answered. Relevent patient education was performed. Verbal and/or written instructions (see AVS) provided. The most recent lab findings were reviewed with the patient. Plan was discussed with patient who verbally expressed understanding. An After Visit Summary was printed and given to the patient.     Wendy Kaur MD

## 2021-04-13 ENCOUNTER — OFFICE VISIT (OUTPATIENT)
Dept: INTERNAL MEDICINE CLINIC | Age: 66
End: 2021-04-13
Payer: COMMERCIAL

## 2021-04-13 VITALS
RESPIRATION RATE: 14 BRPM | WEIGHT: 218 LBS | DIASTOLIC BLOOD PRESSURE: 80 MMHG | OXYGEN SATURATION: 97 % | BODY MASS INDEX: 37.22 KG/M2 | TEMPERATURE: 98.4 F | HEIGHT: 64 IN | SYSTOLIC BLOOD PRESSURE: 138 MMHG | HEART RATE: 71 BPM

## 2021-04-13 DIAGNOSIS — F32.5 MAJOR DEPRESSION IN COMPLETE REMISSION (HCC): ICD-10-CM

## 2021-04-13 DIAGNOSIS — J45.40 MODERATE PERSISTENT ASTHMA WITHOUT COMPLICATION: ICD-10-CM

## 2021-04-13 DIAGNOSIS — E78.5 DYSLIPIDEMIA: Primary | ICD-10-CM

## 2021-04-13 DIAGNOSIS — F41.8 ANXIOUS DEPRESSION: ICD-10-CM

## 2021-04-13 DIAGNOSIS — R73.02 IMPAIRED GLUCOSE TOLERANCE: ICD-10-CM

## 2021-04-13 DIAGNOSIS — E66.9 OBESITY, CLASS I, BMI 30-34.9: ICD-10-CM

## 2021-04-13 DIAGNOSIS — E03.4 HYPOTHYROIDISM DUE TO ACQUIRED ATROPHY OF THYROID: ICD-10-CM

## 2021-04-13 LAB
A-G RATIO,AGRAT: 2 RATIO
ALBUMIN SERPL-MCNC: 4.5 G/DL (ref 3.9–5.4)
ALP SERPL-CCNC: 76 U/L (ref 38–126)
ALT SERPL-CCNC: 37 U/L (ref 0–35)
ANION GAP SERPL CALC-SCNC: 14 MMOL/L
AST SERPL W P-5'-P-CCNC: 37 U/L (ref 14–36)
BILIRUB SERPL-MCNC: 0.5 MG/DL (ref 0.2–1.3)
BUN SERPL-MCNC: 17 MG/DL (ref 7–17)
BUN/CREATININE RATIO,BUCR: 24 RATIO
CALCIUM SERPL-MCNC: 9.7 MG/DL (ref 8.4–10.2)
CHLORIDE SERPL-SCNC: 106 MMOL/L (ref 98–107)
CHOL/HDL RATIO,CHHD: 4 RATIO (ref 0–4)
CHOLEST SERPL-MCNC: 190 MG/DL (ref 0–200)
CO2 SERPL-SCNC: 25 MMOL/L (ref 22–32)
CREAT SERPL-MCNC: 0.7 MG/DL (ref 0.7–1.2)
ERYTHROCYTE [DISTWIDTH] IN BLOOD BY AUTOMATED COUNT: 12.6 % (ref 11.5–14.5)
GLOBULIN,GLOB: 2.3
GLUCOSE SERPL-MCNC: 136 MG/DL (ref 65–105)
HBA1C MFR BLD HPLC: 5.5 % (ref 4–5.7)
HCT VFR BLD AUTO: 42 % (ref 35–47)
HDLC SERPL-MCNC: 52 MG/DL (ref 35–130)
HGB BLD-MCNC: 13.6 G/DL (ref 11.5–16)
LDL/HDL RATIO,LDHD: 2 RATIO
LDLC SERPL CALC-MCNC: 88 MG/DL (ref 0–130)
MCH RBC QN AUTO: 28.6 PG (ref 26–34)
MCHC RBC AUTO-ENTMCNC: 32.4 G/DL (ref 30–36.5)
MCV RBC AUTO: 88.4 FL (ref 80–99)
NRBC # BLD: 0 K/UL (ref 0–0.01)
NRBC BLD-RTO: 0 PER 100 WBC
PLATELET # BLD AUTO: 248 K/UL (ref 150–400)
PMV BLD AUTO: 10.8 FL (ref 8.9–12.9)
POTASSIUM SERPL-SCNC: 4.2 MMOL/L (ref 3.6–5)
PROT SERPL-MCNC: 6.8 G/DL (ref 6.3–8.2)
RBC # BLD AUTO: 4.75 M/UL (ref 3.8–5.2)
SODIUM SERPL-SCNC: 145 MMOL/L (ref 137–145)
T4 FREE SERPL-MCNC: 1.2 NG/DL (ref 0.58–2.3)
TRIGL SERPL-MCNC: 250 MG/DL (ref 0–200)
TSH SERPL DL<=0.05 MIU/L-ACNC: 0.05 UIU/ML (ref 0.34–5.6)
VLDLC SERPL CALC-MCNC: 50 MG/DL
WBC # BLD AUTO: 5.5 K/UL (ref 3.6–11)

## 2021-04-13 PROCEDURE — 84443 ASSAY THYROID STIM HORMONE: CPT | Performed by: INTERNAL MEDICINE

## 2021-04-13 PROCEDURE — 83036 HEMOGLOBIN GLYCOSYLATED A1C: CPT | Performed by: INTERNAL MEDICINE

## 2021-04-13 PROCEDURE — 80053 COMPREHEN METABOLIC PANEL: CPT | Performed by: INTERNAL MEDICINE

## 2021-04-13 PROCEDURE — 99204 OFFICE O/P NEW MOD 45 MIN: CPT | Performed by: INTERNAL MEDICINE

## 2021-04-13 PROCEDURE — 80061 LIPID PANEL: CPT | Performed by: INTERNAL MEDICINE

## 2021-04-13 PROCEDURE — 84439 ASSAY OF FREE THYROXINE: CPT | Performed by: INTERNAL MEDICINE

## 2021-04-13 RX ORDER — LEVOTHYROXINE SODIUM 100 UG/1
100 TABLET ORAL
Qty: 90 TAB | Refills: 0 | Status: SHIPPED | OUTPATIENT
Start: 2021-04-13 | End: 2021-07-11

## 2021-04-13 NOTE — PATIENT INSTRUCTIONS
Hyperlipidemia: After Your Visit  Your Care Instructions  Hyperlipidemia is too much fat in your blood. The body has several kinds of fat, including cholesterol and triglycerides. Your body needs fat for many things, such as making new cells. But too much fat in your blood increases your chances of having a heart attack or stroke. You may be able to lower your cholesterol and triglycerides with a heart-healthy diet, exercise, and if needed, medicine. Your doctor may want you to try lifestyle changes first to see whether they lower the fat in your blood. You may need to take medicine if lifestyle changes do not lower the fat in your blood enough. Follow-up care is a key part of your treatment and safety. Be sure to make and go to all appointments, and call your doctor if you are having problems. Its also a good idea to know your test results and keep a list of the medicines you take. How can you care for yourself at home? Take your medicines  · Take your medicines exactly as prescribed. Call your doctor if you think you are having a problem with your medicine. · If you take medicine to lower your cholesterol, go to follow-up visits. You will need to have blood tests. · Do not take large doses of niacin, which is a B vitamin, while taking medicine called statins. It may increase the chance of muscle pain and liver problems. · Talk to your doctor about avoiding grapefruit juice if you are taking statins. Grapefruit juice can raise the level of this medicine in your blood. This could increase side effects. Eat more fruits, vegetables, and fiber  · Fruits and vegetables have lots of nutrients that help protect against heart disease, and they have little--if any--fat. Try to eat at least five servings a day. Dark green, deep orange, or yellow fruits and vegetables are healthy choices. · Keep carrots, celery, and other veggies handy for snacks.  Buy fruit that is in season and store it where you can see it so that you will be tempted to eat it. Cook dishes that have a lot of veggies in them, such as stir-fries and soups. · Foods high in fiber may reduce your cholesterol and provide important vitamins and minerals. High-fiber foods include whole-grain cereals and breads, oatmeal, beans, brown rice, citrus fruits, and apples. · Buy whole-grain breads and cereals instead of white bread and pastries. Limit saturated fat  · Read food labels and try to avoid saturated fat and trans fat. They increase your risk of heart disease. · Use olive or canola oil when you cook. Try cholesterol-lowering spreads, such as Benecol or Take Control. · Bake, broil, grill, or steam foods instead of frying them. · Limit the amount of high-fat meats you eat, including hot dogs and sausages. Cut out all visible fat when you prepare meat. · Eat fish, skinless poultry, and soy products such as tofu instead of high-fat meats. Soybeans may be especially good for your heart. Eat at least two servings of fish a week. Certain fish, such as salmon, contain omega-3 fatty acids, which may help reduce your risk of heart attack. · Choose low-fat or fat-free milk and dairy products. Get exercise, limit alcohol, and quit smoking  · Get more exercise. Work with your doctor to set up an exercise program. Even if you can do only a small amount, exercise will help you get stronger, have more energy, and manage your weight and your stress. Walking is an easy way to get exercise. Gradually increase the amount you walk every day. Aim for at least 30 minutes on most days of the week. You also may want to swim, bike, or do other activities. · Limit alcohol to no more than 2 drinks a day for men and 1 drink a day for women. · Do not smoke. If you need help quitting, talk to your doctor about stop-smoking programs and medicines. These can increase your chances of quitting for good. When should you call for help?   Call 911 anytime you think you may need emergency care. For example, call if:  · You have symptoms of a heart attack. These may include:  ¨ Chest pain or pressure, or a strange feeling in the chest.  ¨ Sweating. ¨ Shortness of breath. ¨ Nausea or vomiting. ¨ Pain, pressure, or a strange feeling in the back, neck, jaw, or upper belly or in one or both shoulders or arms. ¨ Lightheadedness or sudden weakness. ¨ A fast or irregular heartbeat. After you call 911, the  may tell you to chew 1 adult-strength or 2 to 4 low-dose aspirin. Wait for an ambulance. Do not try to drive yourself. · You have signs of a stroke. These may include:  ¨ Sudden numbness, paralysis, or weakness in your face, arm, or leg, especially on only one side of your body. ¨ New problems with walking or balance. ¨ Sudden vision changes. ¨ Drooling or slurred speech. ¨ New problems speaking or understanding simple statements, or feeling confused. ¨ A sudden, severe headache that is different from past headaches. · You passed out (lost consciousness). Call your doctor now or seek immediate medical care if:  · You have muscle pain or weakness. Watch closely for changes in your health, and be sure to contact your doctor if:  · You are very tired. · You have an upset stomach, gas, constipation, or belly pain or cramps. Where can you learn more? Go to [employer]  Enter C406 in the search box to learn more about \"Hyperlipidemia: After Your Visit. \"   © 3910-4313 [employer], Incorporated. Care instructions adapted under license by [facility] (which disclaims liability or warranty for this information). This care instruction is for use with your licensed healthcare professional. If you have questions about a medical condition or this instruction, always ask your healthcare professional. [place] [address] any warranty or liability for your use of this information.   Content Version: 3.3.317619; Last Revised: October 13, 2011

## 2021-04-13 NOTE — PROGRESS NOTES
TSH low which means overtreatment of thyroid. It has inverse relationship. Will decrease dose of levothyroxine. Prescription for levothyroxine 100 mcg sent to preferred pharmacy. Please stop levothyroxine 125 mcg. Please notify patient. Rest of the labs look good.

## 2021-04-13 NOTE — PROGRESS NOTES
Janine Barber is a 73 y.o. female presenting for Establish Care  . 1. Have you been to the ER, urgent care clinic since your last visit? Hospitalized since your last visit? No    2. Have you seen or consulted any other health care providers outside of the [address] [place] [address] since your last visit? Include any pap smears or colon screening. No    Fall Risk Assessment, last 12 mths 4/13/2021   Able to walk? Yes   Fall in past 12 months? 0   Do you feel unsteady? 0   Are you worried about falling 0         Abuse Screening Questionnaire 4/13/2021   Do you ever feel afraid of your partner? N   Are you in a relationship with someone who physically or mentally threatens you? N   Is it safe for you to go home?  Y       3 most recent PHQ Screens 4/13/2021   Little interest or pleasure in doing things Not at all   Feeling down, depressed, irritable, or hopeless More than half the days   Total Score PHQ 2 2   Trouble falling or staying asleep, or sleeping too much -   Feeling tired or having little energy -   Poor appetite, weight loss, or overeating -   Feeling bad about yourself - or that you are a failure or have let yourself or your family down -   Trouble concentrating on things such as school, work, reading, or watching TV -   Moving or speaking so slowly that other people could have noticed; or the opposite being so fidgety that others notice -   Thoughts of being better off dead, or hurting yourself in some way -   PHQ 9 Score -   How difficult have these problems made it for you to do your work, take care of your home and get along with others -       Medications Discontinued During This Encounter   Medication Reason    glucosamine/chondro jean-baptiste A/C/Mn (GLUCOSAMINE-CHONDROITIN COMPLX PO) DUPLICATE ORDER

## 2021-04-14 NOTE — PROGRESS NOTES
TSH low which means overtreatment of thyroid.  It has inverse relationship.  Will decrease dose of levothyroxine.  Prescription for levothyroxine 100 mcg sent to preferred pharmacy. [facility] stop levothyroxine 125 mcg. Please notify patient. Dario Noriega of the labs look good.

## 2021-04-16 ENCOUNTER — TELEPHONE (OUTPATIENT)
Dept: INTERNAL MEDICINE CLINIC | Age: 66
End: 2021-04-16

## 2021-07-10 DIAGNOSIS — E03.4 HYPOTHYROIDISM DUE TO ACQUIRED ATROPHY OF THYROID: ICD-10-CM

## 2021-07-11 RX ORDER — LEVOTHYROXINE SODIUM 100 UG/1
TABLET ORAL
Qty: 90 TABLET | Refills: 0 | Status: SHIPPED | OUTPATIENT
Start: 2021-07-11 | End: 2021-10-14 | Stop reason: SDUPTHER

## 2021-10-14 ENCOUNTER — OFFICE VISIT (OUTPATIENT)
Dept: INTERNAL MEDICINE CLINIC | Age: 66
End: 2021-10-14
Payer: COMMERCIAL

## 2021-10-14 VITALS
DIASTOLIC BLOOD PRESSURE: 72 MMHG | SYSTOLIC BLOOD PRESSURE: 124 MMHG | RESPIRATION RATE: 16 BRPM | WEIGHT: 211 LBS | HEIGHT: 64 IN | HEART RATE: 68 BPM | BODY MASS INDEX: 36.02 KG/M2 | OXYGEN SATURATION: 97 % | TEMPERATURE: 97.4 F

## 2021-10-14 DIAGNOSIS — F43.23 ADJUSTMENT DISORDER WITH MIXED ANXIETY AND DEPRESSED MOOD: ICD-10-CM

## 2021-10-14 DIAGNOSIS — R73.9 HYPERGLYCEMIA: ICD-10-CM

## 2021-10-14 DIAGNOSIS — J84.10 GRANULOMATOUS LUNG DISEASE (HCC): ICD-10-CM

## 2021-10-14 DIAGNOSIS — Z23 ENCOUNTER FOR IMMUNIZATION: ICD-10-CM

## 2021-10-14 DIAGNOSIS — E78.5 DYSLIPIDEMIA: ICD-10-CM

## 2021-10-14 DIAGNOSIS — Z76.89 ENCOUNTER TO ESTABLISH CARE WITH NEW DOCTOR: ICD-10-CM

## 2021-10-14 DIAGNOSIS — E03.4 HYPOTHYROIDISM DUE TO ACQUIRED ATROPHY OF THYROID: Primary | ICD-10-CM

## 2021-10-14 DIAGNOSIS — J45.40 MODERATE PERSISTENT ASTHMA WITHOUT COMPLICATION: ICD-10-CM

## 2021-10-14 PROCEDURE — 99214 OFFICE O/P EST MOD 30 MIN: CPT | Performed by: PHYSICIAN ASSISTANT

## 2021-10-14 PROCEDURE — 90471 IMMUNIZATION ADMIN: CPT | Performed by: PHYSICIAN ASSISTANT

## 2021-10-14 PROCEDURE — 90732 PPSV23 VACC 2 YRS+ SUBQ/IM: CPT | Performed by: PHYSICIAN ASSISTANT

## 2021-10-14 RX ORDER — PRAVASTATIN SODIUM 40 MG/1
40 TABLET ORAL
Qty: 90 TABLET | Refills: 3 | Status: SHIPPED | OUTPATIENT
Start: 2021-10-14 | End: 2022-03-31 | Stop reason: SDUPTHER

## 2021-10-14 RX ORDER — SERTRALINE HYDROCHLORIDE 50 MG/1
25 TABLET, FILM COATED ORAL DAILY
Qty: 45 TABLET | Refills: 3 | Status: SHIPPED | OUTPATIENT
Start: 2021-10-14 | End: 2022-08-11

## 2021-10-14 RX ORDER — LEVOTHYROXINE SODIUM 100 UG/1
TABLET ORAL
Qty: 90 TABLET | Refills: 3 | Status: SHIPPED | OUTPATIENT
Start: 2021-10-14 | End: 2022-08-11

## 2021-10-14 NOTE — PROGRESS NOTES
Conner Pinzon is a 73 y.o. female     Chief Complaint   Patient presents with    Establish Care       Visit Vitals  /72 (BP 1 Location: Left arm, BP Patient Position: Sitting, BP Cuff Size: Adult)   Pulse 68   Temp 97.4 °F (36.3 °C) (Temporal)   Resp 16   Ht 5' 4\" (1.626 m)   Wt 211 lb (95.7 kg)   SpO2 97%   BMI 36.22 kg/m²       Health Maintenance Due   Topic Date Due    Shingrix Vaccine Age 49> (1 of 2) Never done    DTaP/Tdap/Td series (2 - Td or Tdap) 10/18/2020    Cervical cancer screen  01/19/2021    Bone Densitometry (Dexa) Screening  03/31/2021    Pneumococcal 65+ years (1 of 1 - PPSV23) Never done       1. Have you been to the ER, urgent care clinic since your last visit? Hospitalized since your last visit? No     2. Have you seen or consulted any other health care providers outside of the [address] [place] [address] since your last visit? Include any pap smears or colon screening. No     Flu shot done at [facility]    Had had both covid vaccine. Administered Pneumovax 23 in right deltoid patient tolerated injection well.     EKK#:[id number]    Exp:OCT 15 2022    UOT:[id number]

## 2021-10-14 NOTE — PROGRESS NOTES
HPI:  73 y.o.  presents for follow up appointment. Prior PCP Dr Garfield Hussein who recently left the practice, seeing me to establish care as well. No hospital, ER or specialist visits since last primary care visit except as noted below. Last visit 4/13/21    Moderate intermittent asthma-stable. Follows up with pulmonary associates Dr. Anny Vega, last visit in 2020. On Advair 500 daily, Singulair, and albuterol as needed. Denies shortness of breath, wheezing, cough. Stable. -of note since she has been working from home, she had not had her twice annual asthmatic bronchitis over the last year  -she had pneumococcal vaccine in [address] [place] [address] Avenue  -nodule/granuloma on CXR, stable, followed by pulmonary , lived in [address] [place] [address] when [facility] and had to drive through the [employer], she also has had PNA twice     Patient followed up with cardiology Dr. Filomena Hammans for concerns of palpitations due to increased stress levels. Echo was done which was unremarkable. Showed normal systolic and diastolic heart function. Heart monitor did not show any arrhythmias or abnormal heart rhythms.     Hypothyroidism-on replacement. At last labs in 4/2021, TSH 0.05  Dose was reduced from 125 mcg to 100 mcg    Hyperlipidemia-on pravastatin 40 mg. Denies muscle cramps myalgias. LDL 88,  on 4/2021  grandkids lived with her for a while and tends to have less healthy diet when they are around     OA  S/P right shoulder total replacement [id number], followed by Dr Vivek Carlos with [facility], has final post-op appt with Dr Gabriela Doyle today. She also has a history of left hip replacement 8 years back (Dr Josue Schulte). Adjustment disorder with mixed anxiety and depressed mood - takes Zoloft 25 mg (1/2 tab of 50 mg) daily. Stable on this medication, started about 20 yrs ago when her parents passed away close together in time. No SI/HI.     Hyperglycemia noted on last labs, A1C normal, will recheck    She walks her dog, kayaks in the summer, works in yard; had to stop softball and racquetball at age 47 due to her OA    2 step-children (she has no biological children)  She works from home, IT work for a [address]    Patient Active Problem List    Diagnosis    Colon polyp     Dr. Galdino Larose      Severe obesity ([facility] [address]    Bilateral shoulder pain     R partial RCT, OA GH and AC joint. Dr. Asael Reid      Hyperglycemia    History of total hip replacement    Granulomatous lung disease ([facility] [address]     stable. Dr. Roxanne Fatima.  Family history of osteoporosis    Family history of blood clots    Family history of dementia    Family history of stroke    Family history of diabetes mellitus (DM)    Family history of colon cancer     father      Family history of carcinoma in situ of anal canal     mom        Family history of osteoporosis in mother     and maternal grandmother      Hypothyroidism due to acquired atrophy of thyroid    Major depression in complete remission ([facility] [address]     stable on Zoloft      Dyslipidemia    Vitamin D deficiency    Eczema     Dr. Dalia Pickett      Adjustment disorder     Dr. Alberto Bean. Hanna Chanel, counselor      Obesity, Class I, BMI 30-34.9    Fatty liver    Diverticulosis     Dr. Lucrecia Coleman      Ovarian cyst     Left and Rt . Mrs. Waters      Uterine fibroid     Robin Haq, Midwife           Past Medical History:   Diagnosis Date    Adjustment disorder 08/2012    Dr. Alberto Bean. Hanna Chanel, counselor    Allergic rhinitis 2016    Dr. Ovidio Garner [facility], unspecified 01/25/07    Asthma 1980    Dr. Courtney Willis, pul. [address] bertha inhalation.  Bilateral shoulder pain 2019    R partial RCT, OA GH and AC joint. Dr. Gracia Rai Chickenpox childhood    Colon polyp     Dr. Gricel Quezada Cutaneous wart 05/2015    Right thumb. [facility]. Dr. Avila Meza.  Depression     Digital mucous cyst 05/2015    Left IP joint. Dr. Sofi Self.     Diverticulosis 10/2018 pandiverticulosis. Dr. Emelyn Yancey    DJD (degenerative joint disease) of hip 09/19/12    Dr. Jack Perez    Eczema 2016    Dr. Red Jones liver 2006    Granulomatous lung disease ([facility] 2017    stable. Dr. Melissa Vera.  Hearing aid worn 09/2014    Bilateral ears.  Hematuria 25/8745    Hepatitis B immune March 2014    Prior vaccine--[id number].  High cholesterol 1976    Hypothyroid 10/16/2006    Incidental pulmonary nodule 2019    RUL. Dr. Shaji Renteria.  Knee pain     having surgery on 2/12/13 left    Left hip pain 2013    Dr. Piero Bridges Measles childhood    Menopause 11/2006    Mumps childhood    Ovarian cyst 02/21/2008    Left and Rt . Mrs. Berman Bronjuliano    Pneumonia 1981    x 2    PONV (postoperative nausea and vomiting)     Scarlet fever 1971    Uterine fibroid 02/21/08    [facility], Midwife       Social History     Tobacco Use    Smoking status: Passive Smoke Exposure - Never Smoker    Smokeless tobacco: Never Used    Tobacco comment: grew up with smoker parents x 20 yrs   Vaping Use    Vaping Use: Never used   Substance Use Topics    Alcohol use: Yes     Comment: one beer every few months    Drug use: No       Outpatient Medications Marked as Taking for the 10/14/21 encounter (Office Visit) with Corina Kowalski PA-C   Medication Sig Dispense Refill    levothyroxine (SYNTHROID) 100 mcg tablet TAKE ONE TABLET BY MOUTH EVERY DAY (BEFORE BREAKFAST) 90 Tablet 0    sertraline (ZOLOFT) 50 mg tablet Take 0.5 Tabs by mouth daily. Indications: anxiousness associated with depression 45 Tab 3    pravastatin (PRAVACHOL) 40 mg tablet Take 1 Tab by mouth nightly. Indications: high cholesterol and high triglycerides 90 Tab 4    ADVAIR DISKUS 500-50 mcg/dose diskus inhaler INHALE 1 PUFF TWICE A DAY 1 Inhaler 0    Vuprslew-Ygho-Kvnqva-Hyalur Ac 873-402-84-2 mg cap Take  by mouth.  cetirizine (ZYRTEC) 10 mg tablet Take 10 mg by mouth daily as needed.  montelukast (SINGULAIR) 10 mg tablet TAKE 1 TABLET BY MOUTH DAILY FOR ALLERGIES AND ASTHMA. 30 Tab 11    cholecalciferol, VITAMIN D3, (VITAMIN D3) 5,000 unit tab tablet Take  by mouth daily.  multivitamin (ONE A DAY) tablet Take 1 Tab by mouth daily.  albuterol-ipratropium (DUO-NEB) 2.5 mg-0.5 mg/3 ml nebulizer solution 3 mL by Nebulization route every six (6) hours. 30 Vial 1    Nebulizer & Compressor machine 1 each by Does Not Apply route daily. 1 each 0    PROAIR HFA 90 mcg/actuation inhaler INHALE 1-2 PUFFS EVERY 4-6 HOURS AS NEEDED 1 Inhaler 5       Allergies   Allergen Reactions    Aspirin Other (comments)     Triggers asthma & causes chest tightness but takes 325 mg \"once in a while\"    Lipitor [Atorvastatin] Myalgia     Elevated CPK    Sulfa (Sulfonamide Antibiotics) Rash    Erythromycin Nausea and Vomiting    Penicillins Nausea and Vomiting     Many years ago       ROS:  ROS negative except as per HPI. PE:  Visit Vitals  /72 (BP 1 Location: Left arm, BP Patient Position: Sitting, BP Cuff Size: Adult)   Pulse 68   Temp 97.4 °F (36.3 °C) (Temporal)   Resp 16   Ht 5' 4\" (1.626 m)   Wt 211 lb (95.7 kg)   SpO2 97%   BMI 36.22 kg/m²     Gen: alert, oriented, no acute distress  Head: normocephalic, atraumatic  Eyes: pupils equal round reactive to light, sclera clear, conjunctiva clear  Oral: masked  Neck: supple  Resp: no increase work of breathing, lungs clear to ausculation bilaterally, no wheezing, rales or rhonchi  CV: S1, S2 normal.  No murmurs, rubs, or gallops. Abd: soft, not tender, not distended. Neuro: grossly intact  Skin: no lesion or rash  Extremities: no cyanosis or edema  Psych:  alert, oriented to person, place, and time, normal mood, behavior, speech, dress, motor activity, and thought processes, no suicidal ideations      No results found for this visit on 10/14/21. Assessment/Plan:      ICD-10-CM ICD-9-CM    1.  Hypothyroidism due to acquired atrophy of thyroid  E03.4 244.8 levothyroxine (SYNTHROID) 100 mcg tablet     246.8 TSH 3RD GENERATION    undertreated on Synthroid   2. Adjustment disorder with mixed anxiety and depressed mood  F43.23 309.28 sertraline (ZOLOFT) 50 mg tablet    stable on Zoloft 25 mg daily   3. Dyslipidemia  E78.5 272.4 pravastatin (PRAVACHOL) 40 mg tablet      LIPID PANEL      METABOLIC PANEL, COMPREHENSIVE    worse with reduced exercise and increased pasta consumption during Covid pandemic   4. Moderate persistent asthma without complication  I17.58 387.76    5. Granulomatous lung disease ([facility]  J84.10 518.89    6. Hyperglycemia  R73.9 790.29 HEMOGLOBIN A1C WITH EAG      METABOLIC PANEL, COMPREHENSIVE   7. Encounter for immunization  Z23 V03.89 PNEUMOCOCCAL POLYSACCHARIDE VACCINE, 23-VALENT, ADULT OR IMMUNOSUPPRESSED PT DOSE,      ME IMMUNIZ ADMIN,1 SINGLE/COMB VAC/TOXOID   8. Encounter to establish care with new doctor  Z76.89 V65.8      Recheck TSH today since dose was changed in 4/2021  Mood disorder stable on zoloft  Last lipids showed elevated TG, reviewed diet, recheck labs  Asthma stable per pulmonary   PSV 23 today  Prior elevated BS, A1C normal, recheck today    She will schedule fasting lab visit and a nurse visit for routine Tdap    Health Maintenance reviewed - updated. Orders Placed This Encounter    Pneumococcal polysaccharide vaccine, 23-valent, adult or immunosuppressed pt dose    HEMOGLOBIN A1C WITH EAG     Standing Status:   Future     Standing Expiration Date:   10/14/2022    LIPID PANEL     Standing Status:   Future     Standing Expiration Date:   30/09/7324    METABOLIC PANEL, COMPREHENSIVE     Standing Status:   Future     Standing Expiration Date:   10/14/2022    TSH 3RD GENERATION     Standing Status:   Future     Standing Expiration Date:   10/14/2022    ME IMMUNIZ ADMIN,1 SINGLE/COMB VAC/TOXOID    sertraline (ZOLOFT) 50 mg tablet     Sig: Take 0.5 Tablets by mouth daily.  Indications: anxiousness associated with depression     Dispense:  45 Tablet     Refill:  3    pravastatin (PRAVACHOL) 40 mg tablet     Sig: Take 1 Tablet by mouth nightly. Indications: high cholesterol and high triglycerides     Dispense:  90 Tablet     Refill:  3    levothyroxine (SYNTHROID) 100 mcg tablet     Sig: TAKE ONE TABLET BY MOUTH EVERY DAY (BEFORE BREAKFAST)     Dispense:  90 Tablet     Refill:  3       Medications Discontinued During This Encounter   Medication Reason    sertraline (ZOLOFT) 50 mg tablet REORDER    pravastatin (PRAVACHOL) 40 mg tablet REORDER    levothyroxine (SYNTHROID) 100 mcg tablet REORDER       Current Outpatient Medications   Medication Sig Dispense Refill    sertraline (ZOLOFT) 50 mg tablet Take 0.5 Tablets by mouth daily. Indications: anxiousness associated with depression 45 Tablet 3    pravastatin (PRAVACHOL) 40 mg tablet Take 1 Tablet by mouth nightly. Indications: high cholesterol and high triglycerides 90 Tablet 3    levothyroxine (SYNTHROID) 100 mcg tablet TAKE ONE TABLET BY MOUTH EVERY DAY (BEFORE BREAKFAST) 90 Tablet 3    ADVAIR DISKUS 500-50 mcg/dose diskus inhaler INHALE 1 PUFF TWICE A DAY 1 Inhaler 0    Kgqwwxsq-Tooh-Eyomsu-Hyalur Ac [id number] mg cap Take  by mouth.  cetirizine (ZYRTEC) 10 mg tablet Take 10 mg by mouth daily as needed.  montelukast (SINGULAIR) 10 mg tablet TAKE 1 TABLET BY MOUTH DAILY FOR ALLERGIES AND ASTHMA. 30 Tab 11    cholecalciferol, VITAMIN D3, (VITAMIN D3) 5,000 unit tab tablet Take  by mouth daily.  multivitamin (ONE A DAY) tablet Take 1 Tab by mouth daily.  albuterol-ipratropium (DUO-NEB) 2.5 mg-0.5 mg/3 ml nebulizer solution 3 mL by Nebulization route every six (6) hours. 30 Vial 1    Nebulizer & Compressor machine 1 each by Does Not Apply route daily. 1 each 0    PROAIR HFA 90 mcg/actuation inhaler INHALE 1-2 PUFFS EVERY 4-6 HOURS AS NEEDED 1 Inhaler 5    Blood Pressure Monitor kit 1 Device by Does Not Apply route daily.  1 Kit 0 Recommended healthy diet low in carbohydrates, fats, sodium and cholesterol. Recommended regular cardiovascular exercise 3-6 times per week for 30-60 minutes daily. Verbal and written instructions (see AVS) provided. Patient expresses understanding of diagnosis and treatment plan. Follow-up and Dispositions    · Return in about 4 months (around 2/14/2022) for thyroid, 2nd appt for fasting labs with nurse visit for Tdap.        Future Appointments   Date Time Provider Shahla Farmer   10/22/2021  8:00 AM LAB ONLY [facility]   10/22/2021  8:30 AM NURSE VISIT [facility]   2/14/2022  2:30 PM Corina Kowalski, NARCISO [facility] AMB

## 2021-10-14 NOTE — PATIENT INSTRUCTIONS
Learning About High Triglycerides  What are high triglycerides? Triglycerides are a type of fat in your blood. Your body uses them for energy. You need some for good health. But high triglyceride levels are linked with a higher risk of coronary artery disease. A high level may be a sign of metabolic syndrome. Very high levels raise your risk of pancreatitis. What causes them? High triglycerides can run in families. They may also be caused by other conditions, like obesity and diabetes. You may have high levels of this fat if you eat or drink too many foods or drinks with added sugar or if you drink a lot of alcohol. And some medicines can cause this condition. What are their symptoms? High triglycerides usually don't cause symptoms. But if the condition is genetic, you may see fatty bumps under your skin. How are they diagnosed? A blood test is used to measure triglycerides. It's most accurate if it's done after you go without food or drink for 9 to 14 hours (fasting). Triglyceride levels are:  · Normal when they are less than 150 mg/dL. · Moderately high when they are 150 to 499 mg/dL. · Very high when they are 500 mg/dL or higher. How are they treated? A healthy lifestyle can help lower your triglycerides and your risk of coronary artery disease. It includes losing weight, being active, limiting high-sugar foods and drinks, and limiting alcohol. Your doctor may recommend that you also take medicine. Your doctor will treat other health problems if they are causing high levels. How do you care for yourself when you have high triglycerides? A healthy diet and lifestyle can help lower your triglycerides level and lower your risk of coronary artery disease. · Lose weight, and stay at a healthy weight. Triglycerides are stored as fat in your tissues and muscles. · Limit foods and drinks that have a lot of sugar. These include sugar-sweetened desserts, soda pop, and fruit juice.   · Limit saturated fats. These are found in animal-based foods like meat, butter, milk, and cheese. They are also found in coconut oil, palm oil, and cocoa butter. · Choose a heart-healthy eating plan. Eat a diet that's rich in vegetables, whole grains, fish, lean meats, and low-fat or nonfat dairy foods. Eating oily fish may lower your levels. These fish include salmon, mackerel, lake trout, herring, and sardines. · Limit or avoid alcohol. Limit alcohol to 2 drinks a day for men and 1 drink a day for women. Alcohol has a strong effect on triglycerides. · Be active on most days of the week. Before you start to be more active, check with your doctor to be sure it's safe. Try to do moderate activity at least 2½ hours a week. Or try to do vigorous activity at least 1¼ hours a week. · If you have diabetes, keep your blood sugar in your target range. · Don't smoke. If you need help quitting, talk to your doctor about stop-smoking programs and medicines. These can increase your chances of quitting for good. · Take your medicines exactly as prescribed. Call your doctor if you think you are having a problem with your medicine. Where can you learn more? Go to http://www.gray.com/  Enter T123 in the search box to learn more about \"Learning About High Triglycerides. \"  Current as of: April 29, 2021               Content Version: 13.0  © 2006-2021 Healthwise[employer] Incorporated. Care instructions adapted under license by [employer] (which disclaims liability or warranty for this information). If you have questions about a medical condition or this instruction, always ask your healthcare professional. [place] [address] any warranty or liability for your use of this information. Vaccine Information Statement    Pneumococcal Polysaccharide Vaccine (PPSV23): What You Need to Know    Many Vaccine Information Statements are available in [place]riot and other languages. See www.immunize.org/vis  Hojas de información sobre vacunas están disponibles en español y en muchos otros idiomas. Visite www.immunize.org/vis    1. Why get vaccinated? Pneumococcal polysaccharide vaccine (PPSV23) can prevent pneumococcal disease. Pneumococcal disease refers to any illness caused by pneumococcal bacteria. These bacteria can cause many types of illnesses, including pneumonia, which is an infection of the lungs. Pneumococcal bacteria are one of the most common causes of pneumonia. Besides pneumonia, pneumococcal bacteria can also cause:   Ear infections   Sinus infections   Meningitis (infection of the tissue covering the brain and spinal cord)   Bacteremia (bloodstream infection)    Anyone can get pneumococcal disease, but children under 3years of age, people with certain medical conditions, adults 72 years or older, and cigarette smokers are at the highest risk. Most pneumococcal infections are mild. However, some can result in long-term problems, such as brain damage or hearing loss. Meningitis, bacteremia, and pneumonia caused by pneumococcal disease can be fatal.     2. PPSV23     PPSV23 protects against 23 types of bacteria that cause pneumococcal disease. PPSV23 is recommended for:   All adults 72 years or older,   Anyone 2 years or older with certain medical conditions that can lead to an increased risk for pneumococcal disease. Most people need only one dose of PPSV23. A second dose of PPSV23, and another type of pneumococcal vaccine called PCV13, are recommended for certain high-risk groups. Your health care provider can give you more information.     People 65 years or older should get a dose of PPSV23 even if they have already gotten one or more doses of the vaccine before they turned 72.    3. Talk with your health care provider    Tell your vaccine provider if the person getting the vaccine:   Has had an allergic reaction after a previous dose of PPSV23, or has any severe, life-threatening allergies. In some cases, your health care provider may decide to postpone PPSV23 vaccination to a future visit. People with minor illnesses, such as a cold, may be vaccinated. People who are moderately or severely ill should usually wait until they recover before getting PPSV23. Your health care provider can give you more information. 4. Risks of a vaccine reaction     Redness or pain where the shot is given, feeling tired, fever, or muscle aches can happen after PPSV23. People sometimes faint after medical procedures, including vaccination. Tell your provider if you feel dizzy or have vision changes or ringing in the ears. As with any medicine, there is a very remote chance of a vaccine causing a severe allergic reaction, other serious injury, or death. 5. What if there is a serious problem? An allergic reaction could occur after the vaccinated person leaves the clinic. If you see signs of a severe allergic reaction (hives, swelling of the face and throat, difficulty breathing, a fast heartbeat, dizziness, or weakness), call 9-1-1 and get the person to the nearest hospital.    For other signs that concern you, call your health care provider. Adverse reactions should be reported to the Vaccine Adverse Event Reporting System (VAERS). Your health care provider will usually file this report, or you can do it yourself. Visit the VAERS website at www.vaers. hhs.gov or call 6-323.962.2369. VAERS is only for reporting reactions, and VAERS staff do not give medical advice. 6. How can I learn more?  Ask your health care provider.  Call your local or state health department.    Contact the Centers for Disease Control and Prevention (CDC):  - Call 7-355.366.8341 (1-800-CDC-INFO) or  - Visit CDCs website at www.cdc.gov/vaccines    Vaccine Information Statement   PPSV23   10/30/2019    [facility] for Disease Control and Prevention    Office Use Only

## 2021-10-22 ENCOUNTER — CLINICAL SUPPORT (OUTPATIENT)
Dept: INTERNAL MEDICINE CLINIC | Age: 66
End: 2021-10-22

## 2021-10-22 ENCOUNTER — APPOINTMENT (OUTPATIENT)
Dept: INTERNAL MEDICINE CLINIC | Age: 66
End: 2021-10-22

## 2021-10-22 VITALS
HEART RATE: 67 BPM | OXYGEN SATURATION: 96 % | HEIGHT: 64 IN | SYSTOLIC BLOOD PRESSURE: 128 MMHG | DIASTOLIC BLOOD PRESSURE: 74 MMHG | TEMPERATURE: 97.6 F | RESPIRATION RATE: 18 BRPM | BODY MASS INDEX: 35.89 KG/M2 | WEIGHT: 210.2 LBS

## 2021-10-22 DIAGNOSIS — E78.5 DYSLIPIDEMIA: ICD-10-CM

## 2021-10-22 DIAGNOSIS — Z23 ENCOUNTER FOR IMMUNIZATION: Primary | ICD-10-CM

## 2021-10-22 DIAGNOSIS — E03.4 HYPOTHYROIDISM DUE TO ACQUIRED ATROPHY OF THYROID: ICD-10-CM

## 2021-10-22 DIAGNOSIS — R73.9 HYPERGLYCEMIA: ICD-10-CM

## 2021-10-22 LAB
ALBUMIN SERPL-MCNC: 4 G/DL (ref 3.5–5)
ALBUMIN/GLOB SERPL: 1.3 {RATIO} (ref 1.1–2.2)
ALP SERPL-CCNC: 86 U/L (ref 45–117)
ALT SERPL-CCNC: 30 U/L (ref 12–78)
ANION GAP SERPL CALC-SCNC: 4 MMOL/L (ref 5–15)
AST SERPL-CCNC: 13 U/L (ref 15–37)
BILIRUB SERPL-MCNC: 0.6 MG/DL (ref 0.2–1)
BUN SERPL-MCNC: 17 MG/DL (ref 6–20)
BUN/CREAT SERPL: 18 (ref 12–20)
CALCIUM SERPL-MCNC: 9.2 MG/DL (ref 8.5–10.1)
CHLORIDE SERPL-SCNC: 107 MMOL/L (ref 97–108)
CHOLEST SERPL-MCNC: 214 MG/DL
CO2 SERPL-SCNC: 31 MMOL/L (ref 21–32)
CREAT SERPL-MCNC: 0.97 MG/DL (ref 0.55–1.02)
EST. AVERAGE GLUCOSE BLD GHB EST-MCNC: 103 MG/DL
GLOBULIN SER CALC-MCNC: 3 G/DL (ref 2–4)
GLUCOSE SERPL-MCNC: 97 MG/DL (ref 65–100)
HBA1C MFR BLD: 5.2 % (ref 4–5.6)
HDLC SERPL-MCNC: 50 MG/DL
HDLC SERPL: 4.3 {RATIO} (ref 0–5)
LDLC SERPL CALC-MCNC: 121 MG/DL (ref 0–100)
POTASSIUM SERPL-SCNC: 4.3 MMOL/L (ref 3.5–5.1)
PROT SERPL-MCNC: 7 G/DL (ref 6.4–8.2)
SODIUM SERPL-SCNC: 142 MMOL/L (ref 136–145)
TRIGL SERPL-MCNC: 215 MG/DL (ref ?–150)
TSH SERPL DL<=0.05 MIU/L-ACNC: 1.23 UIU/ML (ref 0.36–3.74)
VLDLC SERPL CALC-MCNC: 43 MG/DL

## 2021-10-22 PROCEDURE — 90715 TDAP VACCINE 7 YRS/> IM: CPT | Performed by: INTERNAL MEDICINE

## 2021-10-22 PROCEDURE — 90471 IMMUNIZATION ADMIN: CPT | Performed by: INTERNAL MEDICINE

## 2021-10-22 NOTE — PROGRESS NOTES
Rosalinda French is 73 y.o. female    Chief Complaint   Patient presents with    Immunization/Injection     Tdap       Visit Vitals  /74 (BP 1 Location: Left arm, BP Patient Position: Sitting, BP Cuff Size: Adult)   Pulse 67   Temp 97.6 °F (36.4 °C) (Temporal)   Resp 18   Ht 5' 4\" (1.626 m)   Wt 210 lb 3.2 oz (95.3 kg)   SpO2 96%   BMI 36.08 kg/m²         1. Have you been to the ER, urgent care clinic or hospitalized since your last visit? No     2. Have you seen or consulted any other health care providers outside of the [address] [place] [address] since your last visit? Include any pap smears or colon screening. No       Administered Tdap in right deltoid patient tolerated injection well.     [id number]    Exp:SEPT 21 2023    KVNGKW:[id number]

## 2021-11-02 NOTE — PROGRESS NOTES
Luz Maria msg sent  The cholesterol went up. Perhaps from change in diet with the grandkids around? ! Work on low fat diet, keep up the exercise. We should recheck in 3-4 months and if not coming back down, we should increase the pravastatin. Thyroid level normal, continue same dose.     Blood sugar normal.    Remainder labs look ok

## 2022-02-14 ENCOUNTER — OFFICE VISIT (OUTPATIENT)
Dept: INTERNAL MEDICINE CLINIC | Age: 67
End: 2022-02-14
Payer: COMMERCIAL

## 2022-02-14 VITALS
BODY MASS INDEX: 37.06 KG/M2 | SYSTOLIC BLOOD PRESSURE: 144 MMHG | TEMPERATURE: 98.4 F | WEIGHT: 217.1 LBS | OXYGEN SATURATION: 99 % | HEIGHT: 64 IN | RESPIRATION RATE: 16 BRPM | HEART RATE: 63 BPM | DIASTOLIC BLOOD PRESSURE: 84 MMHG

## 2022-02-14 DIAGNOSIS — R03.0 ELEVATED BLOOD PRESSURE READING: ICD-10-CM

## 2022-02-14 DIAGNOSIS — E66.01 SEVERE OBESITY (BMI 35.0-39.9) WITH COMORBIDITY (HCC): ICD-10-CM

## 2022-02-14 DIAGNOSIS — J30.1 SEASONAL ALLERGIC RHINITIS DUE TO POLLEN: ICD-10-CM

## 2022-02-14 DIAGNOSIS — J84.10 GRANULOMATOUS LUNG DISEASE (HCC): ICD-10-CM

## 2022-02-14 DIAGNOSIS — E78.5 DYSLIPIDEMIA: Primary | ICD-10-CM

## 2022-02-14 DIAGNOSIS — F32.5 MAJOR DEPRESSION IN COMPLETE REMISSION (HCC): ICD-10-CM

## 2022-02-14 DIAGNOSIS — J45.40 MODERATE PERSISTENT ASTHMA WITHOUT COMPLICATION: ICD-10-CM

## 2022-02-14 DIAGNOSIS — M19.011 PRIMARY OSTEOARTHRITIS OF RIGHT SHOULDER: ICD-10-CM

## 2022-02-14 DIAGNOSIS — E03.4 HYPOTHYROIDISM DUE TO ACQUIRED ATROPHY OF THYROID: ICD-10-CM

## 2022-02-14 PROCEDURE — 99214 OFFICE O/P EST MOD 30 MIN: CPT | Performed by: PHYSICIAN ASSISTANT

## 2022-02-14 RX ORDER — MONTELUKAST SODIUM 10 MG/1
TABLET ORAL
Qty: 90 TABLET | Refills: 3 | Status: SHIPPED | OUTPATIENT
Start: 2022-02-14

## 2022-02-14 NOTE — PROGRESS NOTES
HPI:  73 y.o.  presents for follow up appointment. No hospital, ER or specialist visits since last primary care visit except as noted below. Last visit 10/14/21    Hypothyroidism -on replacement. At last labs in 4/2021, TSH 0.05  Dose was reduced from 125 mcg to 100 mcg  TSH normal 1.23 on 10/2021 and levo 100 mcg maintained     Hyperlipidemia -on pravastatin 40 mg.  Denies muscle cramps myalgias. LDL 88,  on 4/2021  jacky lived with her for a while and tends to have less healthy diet when they are around  LDL went up to 121 on 10/2021 - we did not change pravastatin dose since she had room to improve her diet  -her mother had extremely high cholesterol, so felt to be hereditary  -she reports she has been following low fat diet     OA  S/P right shoulder total replacement 42850, followed by Dr Uday Connell with [address] [place] [address], had final post-op appt with Dr Marco Juares 10/2021Tamikel Looney also has a history of left hip replacement 2013 (Dr Franco).     Adjustment disorder with mixed anxiety and depressed mood - takes Zoloft 25 mg (1/2 tab of 50 mg) daily. Stable on this medication, started about 20 yrs ago when her parents passed away close together in time. No SI/HI.     Hyperglycemia noted on labs in 4/2021, A1C normal in 4/2021 and again normal A1C 5.2%, will recheck    Asthma and allergies  On Advair 500 per pulm Dr Josue Guillermo (has visit yearly), plus Singulair    She walks her dog, kayaks in the summer, works in yard; had to stop softball and racquetball at age 47 due to her OA    2 step-children (she has no biological children)  She works from home, IT work for a [address]    Patient Active Problem List    Diagnosis    Colon polyp     Dr. Sherry Mejia      Severe obesity ([address]    Bilateral shoulder pain     R partial RCT, OA GH and AC joint. Dr. Agudelo Lipoma      Hyperglycemia    History of total hip replacement    Granulomatous lung disease ([address]     stable. Dr. Josue Guillermo.       Family history of osteoporosis    Family history of blood clots    Family history of dementia    Family history of stroke    Family history of diabetes mellitus (DM)    Family history of colon cancer     father      Family history of carcinoma in situ of anal canal     mom        Family history of osteoporosis in mother     and maternal grandmother      Hypothyroidism due to acquired atrophy of thyroid    Major depression in complete remission ([facility]     stable on Zoloft      Dyslipidemia    Vitamin D deficiency    Eczema     Dr. Reyes Bloom      Adjustment disorder     Dr. Elen Rubio. Heriberto Fraire, counselor      Obesity, Class I, BMI 30-34.9    Fatty liver    Diverticulosis     Dr. Machelle Michel      Ovarian cyst     Left and Rt . Mrs. Waters      Uterine fibroid     Sean Lara, Midwife           Past Medical History:   Diagnosis Date    Adjustment disorder 08/2012    Dr. Elen Rubio. Heriberto Fraire, counselor    Allergic rhinitis 2016    Dr. Najma Benedict [facility], unspecified 01/25/07    Asthma 1980    Dr. Carlitos Mohan, pulm. [facility] bertha inhalation.  Bilateral shoulder pain 2019    R partial RCT, OA GH and AC joint. Dr. Julio Valdovinos Chickenpox childhood    Colon polyp     Dr. Kota Rasheed Cutaneous wart 05/2015    Right thumb. [facility]. Dr. Alissa Parekh.  Depression     Digital mucous cyst 05/2015    Left IP joint. Dr. Ashli Wright.  Diverticulosis 10/2018    pandiverticulosis. Dr. Machelle Michel    DJD (degenerative joint disease) of hip 09/19/12    Dr. Elba Oshea    Eczema 2016    Dr. Eva Prajapati liver 2006    Granulomatous lung disease ([facility] 2017    stable. Dr. Madina Pierce.  Hearing aid worn 09/2014    Bilateral ears.  Hematuria 26/6626    Hepatitis B immune March 2014    Prior vaccine--[id number].  High cholesterol 1976    Hypothyroid 10/16/2006    Incidental pulmonary nodule 2019    RUL. Dr. Carlitos Mohan.     Knee pain     having surgery on 2/12/13 left    Left hip pain 2013    Dr. Julio Valdovinos Measles childhood    Menopause 11/2006    Mumps childhood    Ovarian cyst 02/21/2008    Left and Rt . Mrs. Antonella Waters    Pneumonia 1981    x 2    PONV (postoperative nausea and vomiting)     Scarlet fever 1971    Uterine fibroid 02/21/08    Sean Lara, Midwife       Social History     Tobacco Use    Smoking status: Passive Smoke Exposure - Never Smoker    Smokeless tobacco: Never Used    Tobacco comment: grew up with smoker parents x 20 yrs   Vaping Use    Vaping Use: Never used   Substance Use Topics    Alcohol use: Yes     Comment: one beer every few months    Drug use: No       Outpatient Medications Marked as Taking for the 2/14/22 encounter (Office Visit) with Corina Johnson PA-C   Medication Sig Dispense Refill    sertraline (ZOLOFT) 50 mg tablet Take 0.5 Tablets by mouth daily. Indications: anxiousness associated with depression 45 Tablet 3    pravastatin (PRAVACHOL) 40 mg tablet Take 1 Tablet by mouth nightly. Indications: high cholesterol and high triglycerides 90 Tablet 3    levothyroxine (SYNTHROID) 100 mcg tablet TAKE ONE TABLET BY MOUTH EVERY DAY (BEFORE BREAKFAST) 90 Tablet 3    ADVAIR DISKUS 500-50 mcg/dose diskus inhaler INHALE 1 PUFF TWICE A DAY 1 Inhaler 0    Fsfsepjm-Tmsq-Flqzfv-Hyalur Ac 426-234-34-2 mg cap Take  by mouth.  cetirizine (ZYRTEC) 10 mg tablet Take 10 mg by mouth daily as needed.  montelukast (SINGULAIR) 10 mg tablet TAKE 1 TABLET BY MOUTH DAILY FOR ALLERGIES AND ASTHMA. 30 Tab 11    cholecalciferol, VITAMIN D3, (VITAMIN D3) 5,000 unit tab tablet Take  by mouth daily.  multivitamin (ONE A DAY) tablet Take 1 Tab by mouth daily.  albuterol-ipratropium (DUO-NEB) 2.5 mg-0.5 mg/3 ml nebulizer solution 3 mL by Nebulization route every six (6) hours. 30 Vial 1    Nebulizer & Compressor machine 1 each by Does Not Apply route daily.  1 each 0    PROAIR HFA 90 mcg/actuation inhaler INHALE 1-2 PUFFS EVERY 4-6 HOURS AS NEEDED 1 Inhaler 5       Allergies   Allergen Reactions    Aspirin Other (comments)     Triggers asthma & causes chest tightness but takes 325 mg \"once in a while\"    Lipitor [Atorvastatin] Myalgia     Elevated CPK    Sulfa (Sulfonamide Antibiotics) Rash    Erythromycin Nausea and Vomiting    Penicillins Nausea and Vomiting     Many years ago       ROS:  ROS negative except as per HPI. PE:  Visit Vitals  BP (!) 144/84 (BP 1 Location: Left upper arm, BP Patient Position: Sitting)   Pulse 63   Temp 98.4 °F (36.9 °C)   Resp 16   Ht 5' 4\" (1.626 m)   Wt 217 lb 1.6 oz (98.5 kg)   SpO2 99%   BMI 37.27 kg/m²     Gen: alert, oriented, no acute distress  Head: normocephalic, atraumatic  Eyes: pupils equal round reactive to light, sclera clear, conjunctiva clear  Oral: masked  Neck: symmetric normal sized thyroid, no carotid bruits, no lymphadenopathy  Resp: no increase work of breathing, lungs clear to ausculation bilaterally, no wheezing, rales or rhonchi  CV: S1, S2 normal.  No murmurs, rubs, or gallops. Abd: soft, not tender, not distended. .    Neuro: grossly intact  Skin: no lesion or rash  Extremities: no cyanosis or edema    No results found for this visit on 02/14/22.     Lab Results   Component Value Date/Time    TSH 1.23 10/22/2021 08:28 AM    TSH, 3rd generation 0.05 (L) 04/13/2021 02:29 PM     Lab Results   Component Value Date/Time    WBC 5.5 04/13/2021 02:23 PM    Hemoglobin (POC) 11.3 (A) 02/12/2013 03:40 PM    HGB 13.6 04/13/2021 02:23 PM    HCT 42.0 04/13/2021 02:23 PM    PLATELET 271 60/97/3197 02:23 PM    MCV 88.4 04/13/2021 02:23 PM     Lab Results   Component Value Date/Time    Sodium 142 10/22/2021 08:28 AM    Potassium 4.3 10/22/2021 08:28 AM    Chloride 107 10/22/2021 08:28 AM    CO2 31 10/22/2021 08:28 AM    Anion gap 4 (L) 10/22/2021 08:28 AM    Glucose 97 10/22/2021 08:28 AM    BUN 17 10/22/2021 08:28 AM    Creatinine 0.97 10/22/2021 08:28 AM    BUN/Creatinine ratio 18 10/22/2021 08:28 AM    GFR est AA >60 10/22/2021 08:28 AM    GFR est non-AA 58 (L) 10/22/2021 08:28 AM    Calcium 9.2 10/22/2021 08:28 AM    Bilirubin, total 0.6 10/22/2021 08:28 AM    Alk. phosphatase 86 10/22/2021 08:28 AM    Protein, total 7.0 10/22/2021 08:28 AM    Albumin 4.0 10/22/2021 08:28 AM    Globulin 3.0 10/22/2021 08:28 AM    A-G Ratio 1.3 10/22/2021 08:28 AM    ALT (SGPT) 30 10/22/2021 08:28 AM    AST (SGOT) 13 (L) 10/22/2021 08:28 AM     Lab Results   Component Value Date/Time    Cholesterol, total 214 (H) 10/22/2021 08:28 AM    HDL Cholesterol 50 10/22/2021 08:28 AM    LDL, calculated 121 (H) 10/22/2021 08:28 AM    VLDL, calculated 43 10/22/2021 08:28 AM    Triglyceride 215 (H) 10/22/2021 08:28 AM    CHOL/HDL Ratio 4.3 10/22/2021 08:28 AM     Lab Results   Component Value Date/Time    Hemoglobin A1c 5.2 10/22/2021 08:28 AM         Assessment/Plan:      ICD-10-CM ICD-9-CM    1. Dyslipidemia  E78.5 272.4 LIPID PANEL   2. Hypothyroidism due to acquired atrophy of thyroid  E03.4 244.8      246.8    3. Seasonal allergic rhinitis due to pollen  J30.1 477.0 montelukast (SINGULAIR) 10 mg tablet   4. Moderate persistent asthma without complication  G06.56 756.16    5. Granulomatous lung disease ([facility]  J84.10 518.89    6. Severe obesity (BMI 35.0-39. 9) with comorbidity ([facility]  E66.01 278.01    7. Major depression in complete remission (HCC)  F32.5 296.26    8. Elevated blood pressure reading  R03.0 796.2    9.  Primary osteoarthritis of right shoulder  M19.011 715.11        On pravastatin, cholesterol went up around the holidays and grand-kids at the house  She is now back to a prudent low fat diet  Will check fasting lipids soon, and if not improving will switch statins    Euthyroid on current replacement therapy    Allergies controlled with daily singulair    She follows with pulmonary for asthma and h/o granulomatous disease, recent CXR normal, on advair    She is working on low fat diet and exercise    Depression stable on zoloft 25 mg, reviewed possibly weaning off but she feels the medicine helps her tolerate the stress of the grandkids much better    BP elevated in office today, 160/9s initially, 144/84 on recheck,  will monitor out of the office as able and recheck in 6 wks with OV to review lipids as well    OA right shoulder, she has recovered well from recent shoulder replacement    Health Maintenance reviewed - updated. Orders Placed This Encounter    LIPID PANEL     Standing Status:   Future     Standing Expiration Date:   2/14/2023    montelukast (SINGULAIR) 10 mg tablet     Sig: TAKE 1 TABLET BY MOUTH DAILY FOR ALLERGIES AND ASTHMA. Dispense:  90 Tablet     Refill:  3       Medications Discontinued During This Encounter   Medication Reason    montelukast (SINGULAIR) 10 mg tablet REORDER       Current Outpatient Medications   Medication Sig Dispense Refill    montelukast (SINGULAIR) 10 mg tablet TAKE 1 TABLET BY MOUTH DAILY FOR ALLERGIES AND ASTHMA. 90 Tablet 3    sertraline (ZOLOFT) 50 mg tablet Take 0.5 Tablets by mouth daily. Indications: anxiousness associated with depression 45 Tablet 3    pravastatin (PRAVACHOL) 40 mg tablet Take 1 Tablet by mouth nightly. Indications: high cholesterol and high triglycerides 90 Tablet 3    levothyroxine (SYNTHROID) 100 mcg tablet TAKE ONE TABLET BY MOUTH EVERY DAY (BEFORE BREAKFAST) 90 Tablet 3    ADVAIR DISKUS 500-50 mcg/dose diskus inhaler INHALE 1 PUFF TWICE A DAY 1 Inhaler 0    Ezycugkr-Xurr-Fquovy-Hyalur Ac 544-194-11-2 mg cap Take  by mouth.  cetirizine (ZYRTEC) 10 mg tablet Take 10 mg by mouth daily as needed.  cholecalciferol, VITAMIN D3, (VITAMIN D3) 5,000 unit tab tablet Take  by mouth daily.  multivitamin (ONE A DAY) tablet Take 1 Tab by mouth daily.  albuterol-ipratropium (DUO-NEB) 2.5 mg-0.5 mg/3 ml nebulizer solution 3 mL by Nebulization route every six (6) hours.  30 Vial 1  Nebulizer & Compressor machine 1 each by Does Not Apply route daily. 1 each 0    PROAIR HFA 90 mcg/actuation inhaler INHALE 1-2 PUFFS EVERY 4-6 HOURS AS NEEDED 1 Inhaler 5    Blood Pressure Monitor kit 1 Device by Does Not Apply route daily. (Patient not taking: Reported on 2/14/2022) 1 Kit 0       Recommended healthy diet low in carbohydrates, fats, sodium and cholesterol. Recommended regular cardiovascular exercise 3-6 times per week for 30-60 minutes daily. Verbal and written instructions (see AVS) provided. Patient expresses understanding of diagnosis and treatment plan. Follow-up and Dispositions    · Return in about 6 weeks (around 3/28/2022) for Bp check; 2nd appt for fasting labs prior to next visit.        Future Appointments   Date Time Provider Shahla Farmer   3/24/2022  8:00 AM LAB ONLY [facility]   3/31/2022  2:30 PM Corina Kowalski PA-C [facility]

## 2022-02-14 NOTE — PROGRESS NOTES
Rolo Landrum is a 73 y.o. female    Chief Complaint   Patient presents with    Follow-up     4 month follow up    Blood Pressure Check       Visit Vitals  BP (!) 160/92 (BP 1 Location: Left upper arm, BP Patient Position: Sitting, BP Cuff Size: Small adult)   Pulse 63   Temp 98.4 °F (36.9 °C)   Resp 16   Ht 5' 4\" (1.626 m)   Wt 217 lb 1.6 oz (98.5 kg)   SpO2 99%   BMI 37.27 kg/m²           1. Have you been to the ER, urgent care clinic since your last visit? Hospitalized since your last visit? NO    2. Have you seen or consulted any other health care providers outside of the [address] [place] [address] since your last visit? Include any pap smears or colon screening.  NO

## 2022-02-14 NOTE — PATIENT INSTRUCTIONS
High Cholesterol: Care Instructions  Overview     Cholesterol is a type of fat in your blood. It is needed for many body functions, such as making new cells. Cholesterol is made by your body. It also comes from food you eat. High cholesterol means that you have too much of the fat in your blood. This raises your risk of a heart attack and stroke. LDL and HDL are part of your total cholesterol. LDL is the \"bad\" cholesterol. High LDL can raise your risk for coronary artery disease, heart attack, and stroke. HDL is the \"good\" cholesterol. It helps clear bad cholesterol from the body. High HDL is linked with a lower risk of coronary artery disease, heart attack, and stroke. Your cholesterol levels help your doctor find out your risk for having a heart attack or stroke. You and your doctor can talk about whether you need to lower your risk and what treatment is best for you. Treatment options include a heart-healthy lifestyle and medicine. Both options can help lower your cholesterol and your risk. The way you choose to lower your risk will depend on how high your risk is for heart attack and stroke. It will also depend on how you feel about taking medicines. Follow-up care is a key part of your treatment and safety. Be sure to make and go to all appointments, and call your doctor if you are having problems. It's also a good idea to know your test results and keep a list of the medicines you take. How can you care for yourself at home? · Eat heart-healthy foods. ? Eat fruits, vegetables, whole grains, beans, and other high-fiber foods. ? Eat lean proteins, such as seafood, lean meats, beans, nuts, and soy products. ? Eat healthy fats, such as canola and olive oil. ? Choose foods that are low in saturated fat. ? Limit sodium and alcohol. ? Limit drinks and foods with added sugar. · Be physically active. Try to do moderate activity at least 2½ hours a week.  Or try to do vigorous activity at least 1¼ hours a week. You may want to walk or try other activities, such as running, swimming, cycling, or playing tennis or team sports. · Stay at a healthy weight or lose weight by making the changes in eating and physical activity listed above. Losing just a small amount of weight, even 5 to 10 pounds, can help reduce your risk for having a heart attack or stroke. · Do not smoke. · Manage other health problems. These include diabetes and high blood pressure. If you think you may have a problem with alcohol or drug use, talk to your doctor. · If you take medicine, take it exactly as prescribed. Call your doctor if you think you are having a problem with your medicine. · Check with your doctor or pharmacist before you use any other medicines, including over-the-counter medicines. Make sure your doctor knows all of the medicines, vitamins, herbal products, and supplements you take. Taking some medicines together can cause problems. When should you call for help? Watch closely for changes in your health, and be sure to contact your doctor if:    · You need help making lifestyle changes.     · You have questions about your medicine. Where can you learn more? Go to http://www.gray.com/  Enter A121 in the search box to learn more about \"High Cholesterol: Care Instructions. \"  Current as of: April 29, 2021               Content Version: 13.0  © 2006-2021 [employer]. Care instructions adapted under license by [employer] (which disclaims liability or warranty for this information). If you have questions about a medical condition or this instruction, always ask your healthcare professional. [place] [address] any warranty or liability for your use of this information.

## 2022-03-18 PROBLEM — Z96.649 HISTORY OF TOTAL HIP REPLACEMENT: Status: ACTIVE | Noted: 2017-02-21

## 2022-03-18 PROBLEM — R73.9 HYPERGLYCEMIA: Status: ACTIVE | Noted: 2017-08-22

## 2022-03-18 PROBLEM — M25.511 BILATERAL SHOULDER PAIN: Status: ACTIVE | Noted: 2019-01-01

## 2022-03-18 PROBLEM — M25.512 BILATERAL SHOULDER PAIN: Status: ACTIVE | Noted: 2019-01-01

## 2022-03-19 PROBLEM — E66.01 SEVERE OBESITY (HCC): Status: ACTIVE | Noted: 2019-07-31

## 2022-03-20 PROBLEM — J84.10 GRANULOMATOUS LUNG DISEASE (HCC): Status: ACTIVE | Noted: 2017-01-01

## 2022-03-24 ENCOUNTER — APPOINTMENT (OUTPATIENT)
Dept: INTERNAL MEDICINE CLINIC | Age: 67
End: 2022-03-24

## 2022-03-24 DIAGNOSIS — E78.5 DYSLIPIDEMIA: ICD-10-CM

## 2022-03-25 LAB
CHOLEST SERPL-MCNC: 207 MG/DL (ref 100–199)
HDLC SERPL-MCNC: 46 MG/DL
LDLC SERPL CALC-MCNC: 129 MG/DL (ref 0–99)
TRIGL SERPL-MCNC: 178 MG/DL (ref 0–149)
VLDLC SERPL CALC-MCNC: 32 MG/DL (ref 5–40)

## 2022-03-31 ENCOUNTER — OFFICE VISIT (OUTPATIENT)
Dept: INTERNAL MEDICINE CLINIC | Age: 67
End: 2022-03-31
Payer: COMMERCIAL

## 2022-03-31 VITALS
SYSTOLIC BLOOD PRESSURE: 146 MMHG | TEMPERATURE: 97.1 F | BODY MASS INDEX: 36.7 KG/M2 | HEIGHT: 64 IN | DIASTOLIC BLOOD PRESSURE: 92 MMHG | WEIGHT: 215 LBS | HEART RATE: 67 BPM | RESPIRATION RATE: 16 BRPM | OXYGEN SATURATION: 98 %

## 2022-03-31 DIAGNOSIS — L50.9 URTICARIA: ICD-10-CM

## 2022-03-31 DIAGNOSIS — E78.5 DYSLIPIDEMIA: ICD-10-CM

## 2022-03-31 DIAGNOSIS — F43.23 ADJUSTMENT DISORDER WITH MIXED ANXIETY AND DEPRESSED MOOD: ICD-10-CM

## 2022-03-31 DIAGNOSIS — I10 ESSENTIAL HYPERTENSION: Primary | ICD-10-CM

## 2022-03-31 PROCEDURE — 99214 OFFICE O/P EST MOD 30 MIN: CPT | Performed by: PHYSICIAN ASSISTANT

## 2022-03-31 RX ORDER — PRAVASTATIN SODIUM 80 MG/1
40 TABLET ORAL
Qty: 90 TABLET | Refills: 1 | Status: SHIPPED | OUTPATIENT
Start: 2022-03-31 | End: 2022-09-22 | Stop reason: DRUGHIGH

## 2022-03-31 RX ORDER — HYDROXYZINE PAMOATE 25 MG/1
25 CAPSULE ORAL
Qty: 90 CAPSULE | Refills: 1 | Status: SHIPPED | OUTPATIENT
Start: 2022-03-31 | End: 2022-08-15 | Stop reason: ALTCHOICE

## 2022-03-31 RX ORDER — ACETAMINOPHEN 500 MG
TABLET ORAL
Qty: 1 KIT | Refills: 0 | Status: SHIPPED | OUTPATIENT
Start: 2022-03-31

## 2022-03-31 RX ORDER — VALSARTAN 80 MG/1
80 TABLET ORAL DAILY
Qty: 90 TABLET | Refills: 1 | Status: SHIPPED | OUTPATIENT
Start: 2022-03-31 | End: 2022-06-26

## 2022-03-31 NOTE — PATIENT INSTRUCTIONS
DASH Diet: Care Instructions  Your Care Instructions     The DASH diet is an eating plan that can help lower your blood pressure. DASH stands for Dietary Approaches to Stop Hypertension. Hypertension is high blood pressure. The DASH diet focuses on eating foods that are high in calcium, potassium, and magnesium. These nutrients can lower blood pressure. The foods that are highest in these nutrients are fruits, vegetables, low-fat dairy products, nuts, seeds, and legumes. But taking calcium, potassium, and magnesium supplements instead of eating foods that are high in those nutrients does not have the same effect. The DASH diet also includes whole grains, fish, and poultry. The DASH diet is one of several lifestyle changes your doctor may recommend to lower your high blood pressure. Your doctor may also want you to decrease the amount of sodium in your diet. Lowering sodium while following the DASH diet can lower blood pressure even further than just the DASH diet alone. Follow-up care is a key part of your treatment and safety. Be sure to make and go to all appointments, and call your doctor if you are having problems. It's also a good idea to know your test results and keep a list of the medicines you take. How can you care for yourself at home? Following the DASH diet  · Eat 4 to 5 servings of fruit each day. A serving is 1 medium-sized piece of fruit, ½ cup chopped or canned fruit, 1/4 cup dried fruit, or 4 ounces (½ cup) of fruit juice. Choose fruit more often than fruit juice. · Eat 4 to 5 servings of vegetables each day. A serving is 1 cup of lettuce or raw leafy vegetables, ½ cup of chopped or cooked vegetables, or 4 ounces (½ cup) of vegetable juice. Choose vegetables more often than vegetable juice. · Get 2 to 3 servings of low-fat and fat-free dairy each day. A serving is 8 ounces of milk, 1 cup of yogurt, or 1 ½ ounces of cheese. · Eat 6 to 8 servings of grains each day.  A serving is 1 slice of bread, 1 ounce of dry cereal, or ½ cup of cooked rice, pasta, or cooked cereal. Try to choose whole-grain products as much as possible. · Limit lean meat, poultry, and fish to 2 servings each day. A serving is 3 ounces, about the size of a deck of cards. · Eat 4 to 5 servings of nuts, seeds, and legumes (cooked dried beans, lentils, and split peas) each week. A serving is 1/3 cup of nuts, 2 tablespoons of seeds, or ½ cup of cooked beans or peas. · Limit fats and oils to 2 to 3 servings each day. A serving is 1 teaspoon of vegetable oil or 2 tablespoons of salad dressing. · Limit sweets and added sugars to 5 servings or less a week. A serving is 1 tablespoon jelly or jam, ½ cup sorbet, or 1 cup of lemonade. · Eat less than 2,300 milligrams (mg) of sodium a day. If you limit your sodium to 1,500 mg a day, you can lower your blood pressure even more. · Be aware that all of these are the suggested number of servings for people who eat 1,800 to 2,000 calories a day. Your recommended number of servings may be different if you need more or fewer calories. Tips for success  · Start small. Do not try to make dramatic changes to your diet all at once. You might feel that you are missing out on your favorite foods and then be more likely to not follow the plan. Make small changes, and stick with them. Once those changes become habit, add a few more changes. · Try some of the following:  ? Make it a goal to eat a fruit or vegetable at every meal and at snacks. This will make it easy to get the recommended amount of fruits and vegetables each day. ? Try yogurt topped with fruit and nuts for a snack or healthy dessert. ? Add lettuce, tomato, cucumber, and onion to sandwiches. ? Combine a ready-made pizza crust with low-fat mozzarella cheese and lots of vegetable toppings. Try using tomatoes, squash, spinach, broccoli, carrots, cauliflower, and onions. ?  Have a variety of cut-up vegetables with a low-fat dip as an appetizer instead of chips and dip. ? Sprinkle sunflower seeds or chopped almonds over salads. Or try adding chopped walnuts or almonds to cooked vegetables. ? Try some vegetarian meals using beans and peas. Add garbanzo or kidney beans to salads. Make burritos and tacos with mashed guerrero beans or black beans. Where can you learn more? Go to http://www.hull.com/  Enter H967 in the search box to learn more about \"DASH Diet: Care Instructions. \"  Current as of: January 10, 2022               Content Version: 13.2  © 4936-4285 [employer]. Care instructions adapted under license by [employer] (which disclaims liability or warranty for this information). If you have questions about a medical condition or this instruction, always ask your healthcare professional. [address] any warranty or liability for your use of this information.

## 2022-03-31 NOTE — PROGRESS NOTES
Derian Davis is a 78 y.o. female     Chief Complaint   Patient presents with    Hypertension     6 wk follow up       Visit Vitals  BP (!) 146/92 (BP 1 Location: Left arm, BP Patient Position: Sitting, BP Cuff Size: Adult)   Pulse 67   Temp 97.1 °F (36.2 °C) (Temporal)   Resp 16   Ht 5' 4\" (1.626 m)   Wt 215 lb (97.5 kg)   SpO2 98%   BMI 36.90 kg/m²       Health Maintenance Due   Topic Date Due    Bone Densitometry (Dexa) Screening  03/31/2021    COVID-19 Vaccine (3 - Booster for Pfizer series) 02/07/2022         1. \"Have you been to the ER, urgent care clinic since your last visit? Hospitalized since your last visit? \" No    2. \"Have you seen or consulted any other health care providers outside of the [address] [place] [address] since your last visit? \" No     3. For patients aged 39-70: Has the patient had a colonoscopy / FIT/ Cologuard? Yes - no Care Gap present      If the patient is female:    4. For patients aged 41-77: Has the patient had a mammogram within the past 2 years? Yes - no Care Gap present      5. For patients aged 21-65: Has the patient had a pap smear?  Yes - no Care Gap present

## 2022-03-31 NOTE — PROGRESS NOTES
HPI:  78 y.o.  presents for follow up appointment. No hospital, ER or specialist visits since last primary care visit except as noted below. Last visit 2/14/22    F/U BP and review of lipid results    F/U for BP check  Was elevated at last visit, 144/84, has never been on meds  -no fam hx premature CAD  -No blood pressure checks outside of the office since last visit    Hyperlipidemia -on pravastatin 40 mg.  Denies muscle cramps myalgias. LDL 88,  on 4/2021  grandkids lived with her for a while and tends to have less healthy diet when they are around  LDL went up to 121 on 10/2021 - we did not change pravastatin dose since she had room to improve her diet  -her mother had extremely high cholesterol, so felt to be hereditary  -she reports she has been following low fat diet  -atorvastatin caused myalgia and elevated CPK  -labs 3/24/22 total cholesterol 207, , HDL 46,     C/O hives in the mornings x 3 wks  Reports same thing happened several yrs ago when under extreme stress, had work up and different meds with not relief, and resolved when she changed jobs  Current job is very stressful, so she feels that is causing the hives  She just started zyrtec taking periodically since the hives started  No insomnia, has not used hydroxyzine    On sertraline 25 mg for mood disorder    PHQ 9 Score: 0 (3/31/2022  2:34 PM)  Thoughts of being better off dead, or hurting yourself in some way: 0 (3/31/2022  2:34 PM)        Patient Active Problem List    Diagnosis    Colon polyp     Dr. Joseline Dias Severe obesity ([facility]    Bilateral shoulder pain     R partial RCT, OA GH and AC joint. Dr. Aracely Sandoval      Hyperglycemia    History of total hip replacement    Granulomatous lung disease ([facility]     stable. Dr. Suyapa Barahona.       Family history of osteoporosis    Family history of blood clots    Family history of dementia    Family history of stroke    Family history of diabetes mellitus (DM)    Family history of colon cancer     father      Family history of carcinoma in situ of anal canal     mom        Family history of osteoporosis in mother     and maternal grandmother      Hypothyroidism due to acquired atrophy of thyroid    Major depression in complete remission ([facility]     stable on Zoloft      Dyslipidemia    Vitamin D deficiency    Eczema     Dr. Gilson Justice      Adjustment disorder     Dr. Joel Medina. Galileo More, counselor      Obesity, Class I, BMI 30-34.9    Fatty liver    Diverticulosis     Dr. Marina Oliva      Ovarian cyst     Left and Rt . Mrs. Waters      [facility]           Past Medical History:   Diagnosis Date    Adjustment disorder 08/2012    Dr. Joel Medina. Galileo More, counselor    Allergic rhinitis 2016    Dr. Levine Olga state, unspecified 01/25/07    Asthma 1980    Dr. Madalyn Lam, pul. [facility] bertha inhalation.  Bilateral shoulder pain 2019    R partial RCT, OA GH and AC joint. Dr. Erika Montes Chickenpox childhood    Colon polyp     Dr. Braden Samaniego Cutaneous wart 05/2015    Right thumb. [facility]. Dr. Julienne Koch.  Depression     Digital mucous cyst 05/2015    Left IP joint. Dr. Katie Patten.  Diverticulosis 10/2018    pandiverticulosis. Dr. Marina Oliva    DJD (degenerative joint disease) of hip 09/19/12    Dr. Estefania Fong    Eczema 2016    Dr. Nhung Heart liver 2006    Granulomatous lung disease ([facility] [address] 2017    stable. Dr. Yesenia Freedman.  Hearing aid worn 09/2014    Bilateral ears.  Hematuria 82/7192    Hepatitis B immune March 2014    Prior vaccine--[id number].  High cholesterol 1976    Hypothyroid 10/16/2006    Incidental pulmonary nodule 2019    RUL. Dr. Madalyn Lam.     Knee pain     having surgery on 2/12/13 left    Left hip pain 2013    Dr. Erika Montes Measles childhood    Menopause 11/2006    Mumps childhood    Ovarian cyst 02/21/2008 Left and Rt . Mrs. Berman Bronjuliano    Pneumonia 1981    x 2    PONV (postoperative nausea and vomiting)     Scarlet fever 1971    Uterine fibroid 02/21/08    Colleen Lincoln       Social History     Tobacco Use    Smoking status: Passive Smoke Exposure - Never Smoker    Smokeless tobacco: Never Used    Tobacco comment: grew up with smoker parents x 20 yrs   Vaping Use    Vaping Use: Never used   Substance Use Topics    Alcohol use: Yes     Comment: one beer every few months    Drug use: No       Outpatient Medications Marked as Taking for the 3/31/22 encounter (Office Visit) with Corina Kowalski PA-C   Medication Sig Dispense Refill    montelukast (SINGULAIR) 10 mg tablet TAKE 1 TABLET BY MOUTH DAILY FOR ALLERGIES AND ASTHMA. 90 Tablet 3    sertraline (ZOLOFT) 50 mg tablet Take 0.5 Tablets by mouth daily. Indications: anxiousness associated with depression 45 Tablet 3    pravastatin (PRAVACHOL) 40 mg tablet Take 1 Tablet by mouth nightly. Indications: high cholesterol and high triglycerides 90 Tablet 3    levothyroxine (SYNTHROID) 100 mcg tablet TAKE ONE TABLET BY MOUTH EVERY DAY (BEFORE BREAKFAST) 90 Tablet 3    ADVAIR DISKUS 500-50 mcg/dose diskus inhaler INHALE 1 PUFF TWICE A DAY 1 Inhaler 0    Hofdcryg-Bmrz-Oghmuz-Hyalur Ac 827-525-23-2 mg cap Take  by mouth.  cetirizine (ZYRTEC) 10 mg tablet Take 10 mg by mouth daily as needed.  cholecalciferol, VITAMIN D3, (VITAMIN D3) 5,000 unit tab tablet Take  by mouth daily.  multivitamin (ONE A DAY) tablet Take 1 Tab by mouth daily.  albuterol-ipratropium (DUO-NEB) 2.5 mg-0.5 mg/3 ml nebulizer solution 3 mL by Nebulization route every six (6) hours. 30 Vial 1    Nebulizer & Compressor machine 1 each by Does Not Apply route daily.  1 each 0    PROAIR HFA 90 mcg/actuation inhaler INHALE 1-2 PUFFS EVERY 4-6 HOURS AS NEEDED 1 Inhaler 5       Allergies   Allergen Reactions    Aspirin Other (comments)     Triggers asthma & causes chest tightness but takes 325 mg \"once in a while\"    Lipitor [Atorvastatin] Myalgia     Elevated CPK    Sulfa (Sulfonamide Antibiotics) Rash    Erythromycin Nausea and Vomiting    Penicillins Nausea and Vomiting     Many years ago       ROS:  ROS negative except as per HPI. PE:  Visit Vitals  BP (!) 146/92 (BP 1 Location: Left arm, BP Patient Position: Sitting, BP Cuff Size: Adult)   Pulse 67   Temp 97.1 °F (36.2 °C) (Temporal)   Resp 16   Ht 5' 4\" (1.626 m)   Wt 215 lb (97.5 kg)   SpO2 98%   BMI 36.90 kg/m²     Gen: alert, oriented, no acute distress  Head: normocephalic, atraumatic  Eyes: pupils equal round reactive to light, sclera clear, conjunctiva clear  Oral: masked  Neck: symmetric normal sized thyroid, no carotid bruits, no lymphadenpoathy  Resp: no increase work of breathing, lungs clear to ausculation bilaterally, no wheezing, rales or rhonchi  CV: S1, S2 normal.  No murmurs, rubs, or gallops. Abd: soft, not tender, not distended. Normal bowel sounds. Neuro: grossly intact  Skin: (+)wheal on right upper arm  Extremities: no cyanosis or edema  Psych:  alert, oriented to person, place, and time, normal mood, behavior, speech, dress, motor activity, and thought processes, no suicidal ideations      No results found for this visit on 03/31/22.     Lab Results   Component Value Date/Time    Cholesterol, total 207 (H) 03/24/2022 08:05 AM    HDL Cholesterol 46 03/24/2022 08:05 AM    LDL, calculated 129 (H) 03/24/2022 08:05 AM    LDL, calculated 121 (H) 10/22/2021 08:28 AM    VLDL, calculated 32 03/24/2022 08:05 AM    VLDL, calculated 43 10/22/2021 08:28 AM    Triglyceride 178 (H) 03/24/2022 08:05 AM    CHOL/HDL Ratio 4.3 10/22/2021 08:28 AM     Lab Results   Component Value Date/Time    WBC 5.5 04/13/2021 02:23 PM    Hemoglobin (POC) 11.3 (A) 02/12/2013 03:40 PM    HGB 13.6 04/13/2021 02:23 PM    HCT 42.0 04/13/2021 02:23 PM    PLATELET 788 53/69/2568 02:23 PM    MCV 88.4 04/13/2021 02:23 PM Lab Results   Component Value Date/Time    Sodium 142 10/22/2021 08:28 AM    Potassium 4.3 10/22/2021 08:28 AM    Chloride 107 10/22/2021 08:28 AM    CO2 31 10/22/2021 08:28 AM    Anion gap 4 (L) 10/22/2021 08:28 AM    Glucose 97 10/22/2021 08:28 AM    BUN 17 10/22/2021 08:28 AM    Creatinine 0.97 10/22/2021 08:28 AM    BUN/Creatinine ratio 18 10/22/2021 08:28 AM    GFR est AA >60 10/22/2021 08:28 AM    GFR est non-AA 58 (L) 10/22/2021 08:28 AM    Calcium 9.2 10/22/2021 08:28 AM    Bilirubin, total 0.6 10/22/2021 08:28 AM    Alk. phosphatase 86 10/22/2021 08:28 AM    Protein, total 7.0 10/22/2021 08:28 AM    Albumin 4.0 10/22/2021 08:28 AM    Globulin 3.0 10/22/2021 08:28 AM    A-G Ratio 1.3 10/22/2021 08:28 AM    ALT (SGPT) 30 10/22/2021 08:28 AM    AST (SGOT) 13 (L) 10/22/2021 08:28 AM     Lab Results   Component Value Date/Time    TSH 1.23 10/22/2021 08:28 AM    TSH, 3rd generation 0.05 (L) 04/13/2021 02:29 PM     Lab Results   Component Value Date/Time    Vitamin D 25-Hydroxy 43.8 06/21/2011 08:00 AM    VITAMIN D, 25-HYDROXY 38.5 08/19/2020 08:56 AM           Assessment/Plan:      ICD-10-CM ICD-9-CM    1. Essential hypertension  I10 401.9 valsartan (DIOVAN) 80 mg tablet      Blood Pressure Monitor kit   2. Dyslipidemia  E78.5 272.4 pravastatin (PRAVACHOL) 80 mg tablet    worse with reduced exercise and increased pasta consumption during Covid pandemic   3. Urticaria  L50.9 708.9 hydrOXYzine pamoate (VISTARIL) 25 mg capsule   4.  Adjustment disorder with mixed anxiety and depressed mood  F43.23 309.28        /92 today, 144/84 at last visit, new diagnosis of hypertension  Will avoid HCTZ since she has rash with sulfa, and BB since she has asthma  New start valsartan 80 mg  F/u 6 wks BP check, check BMP next visit  She will see if blood pressure monitor is covered by her insurance and try to begin monitoring her blood pressure at home 2 to 3 days in a each week    Dyslipidemia  On pravastatin 40 mg, LDL was 80 in April 2021, but has increased to 129 currently despite attempts at low-fat diet  Strong family history of high cholesterol  She has been under a lot of stress and has been difficult to maintain healthy eating habits  Increase pravastatin to 80 mg  Recheck LFTs at next visit in 6 wks, fasting lipids in 3 mos    Urticaria  Suspect secondary due to stress  switch zyrtec to xyzal, take qAM, may add hydroxyzine QHS if needed  Stress reduction techniques  She is on sertraline 25 mg daily, on this dose for about 20 years. Consider increased dose if urticaria continues at next visit if secondary to stress is still being considered      Health Maintenance reviewed - updated. Orders Placed This Encounter    valsartan (DIOVAN) 80 mg tablet     Sig: Take 1 Tablet by mouth daily. Dispense:  90 Tablet     Refill:  1    pravastatin (PRAVACHOL) 80 mg tablet     Sig: Take 0.5 Tablets by mouth nightly. Indications: high cholesterol and high triglycerides     Dispense:  90 Tablet     Refill:  1    Blood Pressure Monitor kit     Sig: Check blood pressure daily. Dx I10     Dispense:  1 Kit     Refill:  0    hydrOXYzine pamoate (VISTARIL) 25 mg capsule     Sig: Take 1 Capsule by mouth nightly as needed for Itching or Anxiety. Dispense:  90 Capsule     Refill:  1       Medications Discontinued During This Encounter   Medication Reason    pravastatin (PRAVACHOL) 40 mg tablet REORDER    Blood Pressure Monitor kit REORDER       Current Outpatient Medications   Medication Sig Dispense Refill    montelukast (SINGULAIR) 10 mg tablet TAKE 1 TABLET BY MOUTH DAILY FOR ALLERGIES AND ASTHMA. 90 Tablet 3    sertraline (ZOLOFT) 50 mg tablet Take 0.5 Tablets by mouth daily. Indications: anxiousness associated with depression 45 Tablet 3    pravastatin (PRAVACHOL) 40 mg tablet Take 1 Tablet by mouth nightly.  Indications: high cholesterol and high triglycerides 90 Tablet 3    levothyroxine (SYNTHROID) 100 mcg tablet TAKE ONE TABLET BY MOUTH EVERY DAY (BEFORE BREAKFAST) 90 Tablet 3    ADVAIR DISKUS 500-50 mcg/dose diskus inhaler INHALE 1 PUFF TWICE A DAY 1 Inhaler 0    Kzuwaado-Tquz-Orzkra-Hyalur Ac 486-726-32-2 mg cap Take  by mouth.  cetirizine (ZYRTEC) 10 mg tablet Take 10 mg by mouth daily as needed.  cholecalciferol, VITAMIN D3, (VITAMIN D3) 5,000 unit tab tablet Take  by mouth daily.  multivitamin (ONE A DAY) tablet Take 1 Tab by mouth daily.  albuterol-ipratropium (DUO-NEB) 2.5 mg-0.5 mg/3 ml nebulizer solution 3 mL by Nebulization route every six (6) hours. 30 Vial 1    Nebulizer & Compressor machine 1 each by Does Not Apply route daily. 1 each 0    PROAIR HFA 90 mcg/actuation inhaler INHALE 1-2 PUFFS EVERY 4-6 HOURS AS NEEDED 1 Inhaler 5    Blood Pressure Monitor kit 1 Device by Does Not Apply route daily. (Patient not taking: Reported on 2/14/2022) 1 Kit 0       Recommended healthy diet low in carbohydrates, fats, sodium and cholesterol. Recommended regular cardiovascular exercise 3-6 times per week for 30-60 minutes daily. Verbal and written instructions (see AVS) provided. Patient expresses understanding of diagnosis and treatment plan. Follow-up and Dispositions    · Return in about 6 weeks (around 5/12/2022) for HTN. Future Appointments   Date Time Provider Shahla Farmer   5/12/2022  1:00 PM Corina Kowalski, NARCISO PCAFREEDOM BS AMB     Greater than 35 min was spent with her, of which more than 50% was spent on counseling.

## 2022-04-01 ENCOUNTER — TELEPHONE (OUTPATIENT)
Dept: INTERNAL MEDICINE CLINIC | Age: 67
End: 2022-04-01

## 2022-04-01 NOTE — TELEPHONE ENCOUNTER
----- Message from Michael Cardona sent at 4/1/2022 11:10 AM EDT -----  Subject: Medication Problem    QUESTIONS  Name of Medication? pravastatin (PRAVACHOL) 80 mg tablet  Patient-reported dosage and instructions? 80 mg nightly  What question or problem do you have with the medication? Patient states   during her appt it was stated to double her pre appt dose from 40mg to 80. The SIG states to take half of her 80mg which takes her back to the   original dose of 40mg and the dispense QTY is 90. Preferred Pharmacy? [address], [address] phone number (if available)? [phone]  Additional Information for Provider? Pt is suppose to take 80 mg per appt   conversation. Please contact pharmacy to correct  ---------------------------------------------------------------------------  --------------  CALL BACK INFO  What is the best way for the office to contact you? OK to leave message on   voicemail  Preferred Call Back Phone Number? [phone]  ---------------------------------------------------------------------------  --------------  SCRIPT ANSWERS  Relationship to Patient?  Self

## 2022-04-25 ENCOUNTER — APPOINTMENT (OUTPATIENT)
Dept: GENERAL RADIOLOGY | Age: 67
End: 2022-04-25
Attending: EMERGENCY MEDICINE
Payer: COMMERCIAL

## 2022-04-25 ENCOUNTER — HOSPITAL ENCOUNTER (EMERGENCY)
Age: 67
Discharge: HOME | End: 2022-04-25
Attending: EMERGENCY MEDICINE
Payer: COMMERCIAL

## 2022-04-25 ENCOUNTER — TELEPHONE (OUTPATIENT)
Dept: INTERNAL MEDICINE CLINIC | Age: 67
End: 2022-04-25

## 2022-04-25 VITALS
BODY MASS INDEX: 36.28 KG/M2 | TEMPERATURE: 98 F | OXYGEN SATURATION: 100 % | SYSTOLIC BLOOD PRESSURE: 145 MMHG | HEIGHT: 64 IN | HEART RATE: 69 BPM | DIASTOLIC BLOOD PRESSURE: 76 MMHG | RESPIRATION RATE: 16 BRPM | WEIGHT: 212.52 LBS

## 2022-04-25 DIAGNOSIS — L50.9 HIVES: ICD-10-CM

## 2022-04-25 DIAGNOSIS — R06.02 SOB (SHORTNESS OF BREATH): Primary | ICD-10-CM

## 2022-04-25 LAB
ATRIAL RATE: 66 BPM
CALCULATED P AXIS, ECG09: 43 DEGREES
CALCULATED R AXIS, ECG10: 28 DEGREES
CALCULATED T AXIS, ECG11: 34 DEGREES
DIAGNOSIS, 93000: NORMAL
P-R INTERVAL, ECG05: 164 MS
Q-T INTERVAL, ECG07: 404 MS
QRS DURATION, ECG06: 82 MS
QTC CALCULATION (BEZET), ECG08: 423 MS
VENTRICULAR RATE, ECG03: 66 BPM

## 2022-04-25 PROCEDURE — 96375 TX/PRO/DX INJ NEW DRUG ADDON: CPT

## 2022-04-25 PROCEDURE — 71045 X-RAY EXAM CHEST 1 VIEW: CPT

## 2022-04-25 PROCEDURE — 99284 EMERGENCY DEPT VISIT MOD MDM: CPT

## 2022-04-25 PROCEDURE — 93005 ELECTROCARDIOGRAM TRACING: CPT

## 2022-04-25 PROCEDURE — 96374 THER/PROPH/DIAG INJ IV PUSH: CPT

## 2022-04-25 PROCEDURE — 74011250636 HC RX REV CODE- 250/636: Performed by: EMERGENCY MEDICINE

## 2022-04-25 RX ORDER — DIPHENHYDRAMINE HYDROCHLORIDE 50 MG/ML
25 INJECTION, SOLUTION INTRAMUSCULAR; INTRAVENOUS
Status: COMPLETED | OUTPATIENT
Start: 2022-04-25 | End: 2022-04-25

## 2022-04-25 RX ORDER — PREDNISONE 10 MG/1
TABLET ORAL
Qty: 21 TABLET | Refills: 0 | Status: SHIPPED | OUTPATIENT
Start: 2022-04-25

## 2022-04-25 RX ADMIN — METHYLPREDNISOLONE SODIUM SUCCINATE 125 MG: 125 INJECTION, POWDER, FOR SOLUTION INTRAMUSCULAR; INTRAVENOUS at 13:20

## 2022-04-25 RX ADMIN — DIPHENHYDRAMINE HYDROCHLORIDE 25 MG: 50 INJECTION, SOLUTION INTRAMUSCULAR; INTRAVENOUS at 13:20

## 2022-04-25 NOTE — DISCHARGE INSTRUCTIONS
You were evaluated in the emergency department for shortness of breath and hives. Your examination was reassuring as was your work-up including chest x-ray and EKG. It will be important for you to follow-up with your primary care physician in 2-3 days. If you develop worsening symptoms such as chest pain, shortness of breath, or fevers, please return to the emergency department immediately.

## 2022-04-25 NOTE — ED PROVIDER NOTES
EMERGENCY DEPARTMENT HISTORY AND PHYSICAL EXAM      Date: 4/25/2022  Patient Name: Carlos Clements    History of Presenting Illness     Chief Complaint   Patient presents with    Shortness of Breath     Pt ambulatory into triage with a cc of hives x 2 months and is now experiencing shortness of breath; pt was on the way to PCP off when the shortness of breath started; PCP referred pt to ED; pt denies throat itching and swelling        History Provided By: Patient    HPI: Carlos Clements, 78 y.o. female presents to the ED with cc of hives and shortness of breath. Symptoms started again today. This has been a recurrent problem for patient. She developed hives and urticaria throughout her bilateral upper extremities, trunk, chest, back. She does not know what she was exposed to that caused the symptoms. She did develop some shortness of breath but denies any stridor, throat swelling, or change in voice. She describes shortness of breath is chest tightness and she does have history of asthma. She has been using albuterol inhaler without significant relief of symptoms. Denies any fevers, chills, or exposure to sick contacts. There are no other complaints, changes, or physical findings at this time. PCP: Corina Kowalski PA-C    No current facility-administered medications on file prior to encounter. Current Outpatient Medications on File Prior to Encounter   Medication Sig Dispense Refill    valsartan (DIOVAN) 80 mg tablet Take 1 Tablet by mouth daily. 90 Tablet 1    pravastatin (PRAVACHOL) 80 mg tablet Take 0.5 Tablets by mouth nightly. Indications: high cholesterol and high triglycerides 90 Tablet 1    Blood Pressure Monitor kit Check blood pressure daily. Dx I10 1 Kit 0    hydrOXYzine pamoate (VISTARIL) 25 mg capsule Take 1 Capsule by mouth nightly as needed for Itching or Anxiety. 90 Capsule 1    montelukast (SINGULAIR) 10 mg tablet TAKE 1 TABLET BY MOUTH DAILY FOR ALLERGIES AND ASTHMA.  80 Tablet 3    sertraline (ZOLOFT) 50 mg tablet Take 0.5 Tablets by mouth daily. Indications: anxiousness associated with depression 45 Tablet 3    levothyroxine (SYNTHROID) 100 mcg tablet TAKE ONE TABLET BY MOUTH EVERY DAY (BEFORE BREAKFAST) 90 Tablet 3    ADVAIR DISKUS 500-50 mcg/dose diskus inhaler INHALE 1 PUFF TWICE A DAY 1 Inhaler 0    Udltxyrn-Lgtp-Qphnyx-Hyalur Ac 371-815-39-2 mg cap Take  by mouth.  cetirizine (ZYRTEC) 10 mg tablet Take 10 mg by mouth daily as needed.  cholecalciferol, VITAMIN D3, (VITAMIN D3) 5,000 unit tab tablet Take  by mouth daily.  multivitamin (ONE A DAY) tablet Take 1 Tab by mouth daily.  albuterol-ipratropium (DUO-NEB) 2.5 mg-0.5 mg/3 ml nebulizer solution 3 mL by Nebulization route every six (6) hours. 30 Vial 1    Nebulizer & Compressor machine 1 each by Does Not Apply route daily. 1 each 0    PROAIR HFA 90 mcg/actuation inhaler INHALE 1-2 PUFFS EVERY 4-6 HOURS AS NEEDED 1 Inhaler 5       Past History     Past Medical History:  Past Medical History:   Diagnosis Date    Adjustment disorder 08/2012    Dr. Arturo Crenshaw. Syl Hilton, counselor    Allergic rhinitis 2016    Dr. [facility], unspecified 01/25/07    Asthma 1980    Dr. Medhat Lindsay, pulm. [facility] bertha inhalation.  Bilateral shoulder pain 2019    R partial RCT, OA GH and AC joint. Dr. Vania Morgan Chickenpox childhood    Colon polyp     Dr. Mahesh Hawkins Cutaneous wart 05/2015    Right thumb. [facility]. Dr. Trenton Vargas.  Depression     Digital mucous cyst 05/2015    Left IP joint. Dr. Walt Tim.  Diverticulosis 10/2018    pandiverticulosis. Dr. Gia Brennan    DJD (degenerative joint disease) of hip 09/19/12    Dr. Rao Farris    Eczema 2016    Dr. Mellissa Michele liver 2006    Granulomatous lung disease ([facility] [address] 2017    stable. Dr. Nori Lemon.  Hearing aid worn 09/2014    Bilateral ears.       Hematuria 04/1988    Hepatitis B immune March 2014    Prior vaccine--[id number].  High cholesterol 1976    Hypothyroid 10/16/2006    Incidental pulmonary nodule 2019    RUL. Dr. Mega Patten.  Knee pain     having surgery on 2/12/13 left    Left hip pain 2013    Dr. Milton Vogt Measles childhood    Menopause 11/2006    Mumps childhood    Ovarian cyst 02/21/2008    Left and Rt . Mrs. Antonella Waters    Pneumonia 1981    x 2    PONV (postoperative nausea and vomiting)     Scarlet fever 1971    Uterine fibroid 02/21/08    Dario Neff, Midwife       Past Surgical History:  Past Surgical History:   Procedure Laterality Date    COLONOSCOPY N/A 10/5/2018    Dr. Patricia Tse MD at [facility]. due q 5 yrs.  HX COLONOSCOPY  04/07/08    Dr. Rodrigo Scott. due q 5 yrs.  HX HIP REPLACEMENT Left 02/12/13    Left.  with Anterior approach. Dr. Miroslava Zhou.  HX ORTHOPAEDIC Left 05/18/15    Thumb. IP joint MUCOUS CYST REMOVED. Dr. Shelby Whitman.  HX ORTHOPAEDIC Right 05/18/2015    Thumb. Dr. Vasyl Dooley.     HX SHOULDER REPLACEMENT Right 04/2021    HX SHOULDER REPLACEMENT Right 04/2021    HX TONSILLECTOMY  1962       Family History:  Family History   Problem Relation Age of Onset    Cancer Mother         Squamous Cell Anal CA with mets (including skin)    High Cholesterol Mother     Osteoporosis Mother     Colon Cancer Father     Hypertension Father     Cancer Maternal Grandmother         cervical    Stroke Maternal Grandmother         TIAs    Other Maternal Grandmother         multiple blood clots    Osteoporosis Maternal Grandmother     OSTEOARTHRITIS Maternal Grandmother     Dementia Maternal Grandmother         Alzheimers    Diabetes Paternal Grandfather        Social History:  Social History     Tobacco Use    Smoking status: Passive Smoke Exposure - Never Smoker    Smokeless tobacco: Never Used    Tobacco comment: grew up with smoker parents x 20 yrs   Vaping Use    Vaping Use: Never used Substance Use Topics    Alcohol use: Yes     Comment: one beer every few months    Drug use: No       Allergies: Allergies   Allergen Reactions    Aspirin Other (comments)     Triggers asthma & causes chest tightness but takes 325 mg \"once in a while\"    Lipitor [Atorvastatin] Myalgia     Elevated CPK    Sulfa (Sulfonamide Antibiotics) Rash    Erythromycin Nausea and Vomiting    Penicillins Nausea and Vomiting     Many years ago         Review of Systems   Review of Systems   Constitutional: Negative for chills and fever. Respiratory: Positive for chest tightness and shortness of breath. Negative for cough. Cardiovascular: Negative for chest pain and leg swelling. Gastrointestinal: Negative for abdominal pain, nausea and vomiting. Musculoskeletal: Negative for back pain and gait problem. Skin: Positive for rash. Negative for color change. Neurological: Negative for dizziness, weakness, light-headedness and headaches. All other systems reviewed and are negative. Physical Exam   Physical Exam  Vitals and nursing note reviewed. Constitutional:       General: She is not in acute distress. Appearance: Normal appearance. She is not ill-appearing, toxic-appearing or diaphoretic. HENT:      Head: Normocephalic and atraumatic. Cardiovascular:      Rate and Rhythm: Normal rate and regular rhythm. Heart sounds: Normal heart sounds. No murmur heard. Pulmonary:      Effort: Pulmonary effort is normal. No respiratory distress. Breath sounds: Normal breath sounds. No wheezing. Abdominal:      Palpations: Abdomen is soft. Tenderness: There is no abdominal tenderness. There is no guarding or rebound. Skin:     General: Skin is warm and dry. Comments: Minimal hives and urticaria noted bilateral upper extremities on my exam.   Neurological:      General: No focal deficit present. Mental Status: She is alert and oriented to person, place, and time. Diagnostic Study Results     Labs -   Labs Reviewed - No data to display    Radiologic Studies -   XR CHEST PORT   Final Result   No acute process. CT Results  (Last 48 hours)    None        CXR Results  (Last 48 hours)    None          Medical Decision Making   I am the first provider for this patient. I reviewed the vital signs, available nursing notes, past medical history, past surgical history, family history and social history. Vital Signs-Reviewed the patient's vital signs. Visit Vitals  BP (!) 145/76 (BP 1 Location: Left arm, BP Patient Position: At rest)   Pulse 69   Temp 98 °F (36.7 °C)   Resp 16   Ht 5' 4\" (1.626 m)   Wt 96.4 kg (212 lb 8.4 oz)   SpO2 100%   BMI 36.48 kg/m²       Records Reviewed: Nursing Notes    Provider Notes (Medical Decision Making):   72-year-old female presenting to emergency department for diffuse urticaria and hives as well as shortness of breath and chest tightness onset this morning. This has been a recurrent problem for patient. She has minimal has no urticaria on my evaluation but posterior oropharynx is clear and she does not have any respiratory complaints consistent with anaphylaxis. Shortness of breath and chest tightness appears more consistent with reactive airway disease and she does have history of asthma. Lungs clear to auscultation on my exam.  No increased work of breathing or hypoxia and O2 sats 100% on room air. Patient treated with Solu-Medrol and Benadryl and on reevaluation she is feeling much better. Chest x-ray without acute process. She denies any chest pain or shortness of breath currently and I do not feel she requires laboratory evaluation at this time. Symptoms likely related to reactive airway disease from environmental allergies. Patient encouraged close follow-up with her PCP and given strict return to ED precautions. All questions answered and she agrees with plan as above.       ED Course:   Initial assessment performed. The patients presenting problems have been discussed, and they are in agreement with the care plan formulated and outlined with them. I have encouraged them to ask questions as they arise throughout their visit. Discharge Note:  The patient has been re-evaluated and is ready for discharge. Reviewed available results with patient. Counseled patient on diagnosis and care plan. Patient has expressed understanding, and all questions have been answered. Patient agrees with plan and agrees to follow up as recommended, or to return to the ED if their symptoms worsen. Discharge instructions have been provided and explained to the patient, along with reasons to return to the ED. Disposition:  Discharge    DISCHARGE PLAN:  1. Discharge Medication List as of 4/25/2022  2:38 PM      START taking these medications    Details   predniSONE (STERAPRED DS) 10 mg dose pack Take by mouth as directed until completion., Normal, Disp-21 Tablet, R-0         CONTINUE these medications which have NOT CHANGED    Details   valsartan (DIOVAN) 80 mg tablet Take 1 Tablet by mouth daily. , Normal, Disp-90 Tablet, R-1      pravastatin (PRAVACHOL) 80 mg tablet Take 0.5 Tablets by mouth nightly. Indications: high cholesterol and high triglycerides, Normal, Disp-90 Tablet, R-1      Blood Pressure Monitor kit Check blood pressure daily. Dx I10, Normal, Disp-1 Kit, R-0      hydrOXYzine pamoate (VISTARIL) 25 mg capsule Take 1 Capsule by mouth nightly as needed for Itching or Anxiety., Normal, Disp-90 Capsule, R-1      montelukast (SINGULAIR) 10 mg tablet TAKE 1 TABLET BY MOUTH DAILY FOR ALLERGIES AND ASTHMA., Normal, Disp-90 Tablet, R-3      sertraline (ZOLOFT) 50 mg tablet Take 0.5 Tablets by mouth daily.  Indications: anxiousness associated with depression, Normal, Disp-45 Tablet, R-3      levothyroxine (SYNTHROID) 100 mcg tablet TAKE ONE TABLET BY MOUTH EVERY DAY (BEFORE BREAKFAST), Normal, Disp-90 Tablet, R-3      ADVAIR DISKUS 500-50 mcg/dose diskus inhaler INHALE 1 PUFF TWICE A DAY, Normal, Disp-1 Inhaler,R-0      Tjivbzsm-Pyrj-Vspazd-Hyalur Ac 482-068-25-2 mg cap Take  by mouth., Historical Med      cetirizine (ZYRTEC) 10 mg tablet Take 10 mg by mouth daily as needed., Historical Med      cholecalciferol, VITAMIN D3, (VITAMIN D3) 5,000 unit tab tablet Take  by mouth daily. , Historical Med      multivitamin (ONE A DAY) tablet Take 1 Tab by mouth daily. , Historical Med      albuterol-ipratropium (DUO-NEB) 2.5 mg-0.5 mg/3 ml nebulizer solution 3 mL by Nebulization route every six (6) hours. , Normal, Disp-30 Vial, R-1      Nebulizer & Compressor machine 1 each by Does Not Apply route daily. , Normal, Disp-1 each, R-0      PROAIR HFA 90 mcg/actuation inhaler INHALE 1-2 PUFFS EVERY 4-6 HOURS AS NEEDED, Normal, Disp-1 Inhaler, R-5           2. Follow-up Information     Follow up With Specialties Details Why Contact Info    Ang Monzon PA-C Physician Assistant Schedule an appointment as soon as possible for a visit   [place] [address]  [phone]      [facility] EMERGENCY DEPT Emergency Medicine Go to  As needed, If symptoms worsen [address] [place] [address]  [address] [facility]  [phone]        3. Return to ED if worse     Diagnosis     Clinical Impression:   1. SOB (shortness of breath)    2. Hives        Attestations:  I am the first and primary provider of record for this patient's ED encounter. I personally performed the services described above in this documentation. Judy Mora MD    Please note that this dictation was completed with [employer], the [employer] voice recognition software. Quite often unanticipated grammatical, syntax, homophones, and other interpretive errors are inadvertently transcribed by the computer software. Please disregard these errors. Please excuse any errors that have escaped final proofreading. Thank you.

## 2022-05-12 ENCOUNTER — OFFICE VISIT (OUTPATIENT)
Dept: INTERNAL MEDICINE CLINIC | Age: 67
End: 2022-05-12
Payer: COMMERCIAL

## 2022-05-12 VITALS
HEART RATE: 74 BPM | SYSTOLIC BLOOD PRESSURE: 128 MMHG | RESPIRATION RATE: 18 BRPM | WEIGHT: 214.3 LBS | OXYGEN SATURATION: 98 % | DIASTOLIC BLOOD PRESSURE: 76 MMHG | BODY MASS INDEX: 36.58 KG/M2 | TEMPERATURE: 97.9 F | HEIGHT: 64 IN

## 2022-05-12 DIAGNOSIS — L50.9 URTICARIA: ICD-10-CM

## 2022-05-12 DIAGNOSIS — I10 ESSENTIAL HYPERTENSION: Primary | ICD-10-CM

## 2022-05-12 DIAGNOSIS — E03.4 HYPOTHYROIDISM DUE TO ACQUIRED ATROPHY OF THYROID: ICD-10-CM

## 2022-05-12 PROBLEM — N18.30 CHRONIC RENAL DISEASE, STAGE III (HCC): Status: ACTIVE | Noted: 2022-05-12

## 2022-05-12 PROCEDURE — 99214 OFFICE O/P EST MOD 30 MIN: CPT | Performed by: PHYSICIAN ASSISTANT

## 2022-05-12 RX ORDER — MINERAL OIL
180 ENEMA (ML) RECTAL DAILY
COMMUNITY

## 2022-05-12 NOTE — PROGRESS NOTES
Tram Myers is a 78 y.o. female     Chief Complaint   Patient presents with    Hypertension     6wk folllow up    Hives     hives all over the body       Visit Vitals  /76 (BP 1 Location: Left arm, BP Patient Position: Sitting, BP Cuff Size: Adult)   Pulse 74   Temp 97.9 °F (36.6 °C) (Temporal)   Resp 18   Ht 5' 4\" (1.626 m)   Wt 214 lb 4.8 oz (97.2 kg)   SpO2 98%   BMI 36.78 kg/m²       Health Maintenance Due   Topic Date Due    Bone Densitometry (Dexa) Screening  03/31/2021    COVID-19 Vaccine (3 - Booster for Pfizer series) 02/07/2022         1. \"Have you been to the ER, urgent care clinic since your last visit? Hospitalized since your last visit? \" Yes seen at [facility] on 4/25/22 for a rash and SOB    2. \"Have you seen or consulted any other health care providers outside of the [address] [place] [address] since your last visit? \" No     3. For patients aged 39-70: Has the patient had a colonoscopy / FIT/ Cologuard? Yes - no Care Gap present      If the patient is female:    4. For patients aged 41-77: Has the patient had a mammogram within the past 2 years? Yes - no Care Gap present      5. For patients aged 21-65: Has the patient had a pap smear?  Yes - no Care Gap present

## 2022-05-12 NOTE — PATIENT INSTRUCTIONS
Take Xyzal and Allegra, 1 pill each, together, twice a day (about 12 hours apart)    Let me know your thyroid test results. Chronic Hives: Care Instructions  Overview  Chronic hives are long-lasting raised, red, and itchy patches of skin. Hives usually have red borders and pale centers. They range in size from ¼ inch to 3 inches or more across. They may seem to move from place to place on the skin. Several hives may join to form a large area of raised, red skin. When hives and swelling last more than 6 weeks even with treatment, they are called chronic. Hives may occur with swelling under the skin. But you may have swelling without hives. Swelling may hurt a bit, but it does not usually itch like hives. You cannot spread hives to other people. Follow-up care is a key part of your treatment and safety. Be sure to make and go to all appointments, and call your doctor if you are having problems. It's also a good idea to know your test results and keep a list of the medicines you take. How can you care for yourself at home? · Avoid whatever you think may have caused your hives, such as a certain food or medicine. But you may not know the cause. · Put a cool, wet towel on the area to relieve itching. · Your doctor may suggest a nondrowsy antihistamine, such as loratadine (Claritin), to help control the hives. Be safe with medicines. Read and follow all instructions on the label. · Your doctor may prescribe a shot of epinephrine to carry with you in case you have a severe reaction. Learn how to give yourself the shot, and keep it with you at all times. Make sure it has not . · If your doctor prescribes another medicine, take it exactly as directed. When should you call for help? Give an epinephrine shot if:    · You think you are having a severe allergic reaction. After giving an epinephrine shot call 911, even if you feel better.   Call 911 if:    · You have symptoms of a severe allergic reaction. These may include:  ? Sudden raised, red areas (hives) all over your body. ? Swelling of the throat, mouth, lips, or tongue. ? Trouble breathing. ? Passing out (losing consciousness). Or you may feel very lightheaded or suddenly feel weak, confused, or restless.     · You have been given an epinephrine shot, even if you feel better. Call your doctor now or seek immediate medical care if:    · Your hives get worse. Watch closely for changes in your health, and be sure to contact your doctor if:    · You do not get better as expected. Where can you learn more? Go to http://www.gray.com/  Enter A241 in the search box to learn more about \"Chronic Hives: Care Instructions. \"  Current as of: November 15, 2021               Content Version: 13.2  © 9796-8074 [employer], [employer]. Care instructions adapted under license by [employer] (which disclaims liability or warranty for this information). If you have questions about a medical condition or this instruction, always ask your healthcare professional. [address] any warranty or liability for your use of this information.

## 2022-05-12 NOTE — PROGRESS NOTES
HPI:  78 y.o.  presents for follow up appointment. No hospital, ER or specialist visits since last primary care visit except as noted below. Last visit 3/31/22    HTN  On valsartan 80 mg (started 3/31/22) with good tolerance  - home monitoring with her new monitor shows 130/70-80. No history of heart disease or stroke. No chest pain, no shortness of breath, no headaches, no lower extremity swelling. Urticaria  3/31/22: C/O hives in the mornings x 3 wks  Reports same thing happened several yrs ago when under extreme stress, had work up and different meds with not relief, and resolved when she changed jobs  Current job is very stressful, so she feels that is causing the hives  She just started zyrtec taking periodically since the hives started  No insomnia, has not used hydroxyzine  Plan: Suspect secondary due to stress  switch zyrtec to xyzal, take qAM, may add hydroxyzine QHS if needed  Stress reduction techniques  She is on sertraline 25 mg daily, on this dose for about 20 years.   Consider increased dose if urticaria continues at next visit if secondary to stress is still being considered    TODAY 5/12/22: she was taking xyzal plus zyrtec and hydroxyzine at night  She started having wheezing, tight chest, like an asthma attack, and went to ER 4/25/22 and had a steroid dose pack with complete resolution but sxs returned after completing the steroid, and had F/U with her pulmonologist - Dr Janie Israel, who recommended xyzal qAM, benadryl mid day instead of zyrtec,  hydroxyzine QHS, and to see her Derm, continue same [facility] Dr Nimco Singletary  She wanted a \"full blood panel\"   Stop the benadryl since makes her drowsy and switch to allegra    Now taking: Xyzal qAM, allegra mid day, hydroxyzine QHS  Derm also recommended a topical steroid cream that is on order from the pharmacy      Patient Active Problem List    Diagnosis    Colon polyp     Dr. Constantin Burden      Severe obesity ([facility] [address]  Bilateral shoulder pain     R partial RCT, OA GH and AC joint. Dr. Kunal Hernandes      Hyperglycemia    History of total hip replacement    [facility]     stable. Dr. Korina Barbosa.  Family history of osteoporosis    Family history of blood clots    Family history of dementia    Family history of stroke    Family history of diabetes mellitus (DM)    Family history of colon cancer     father      Family history of carcinoma in situ of anal canal     mom        Family history of osteoporosis in mother     and maternal grandmother      Hypothyroidism due to acquired atrophy of thyroid    Major depression in complete remission ([facility]     stable on Zoloft      Dyslipidemia    Vitamin D deficiency    Eczema     Dr. Gabriel Zendejas      Adjustment disorder     Dr. Patricia Viera. Joanie More, counselor      Obesity, Class I, BMI 30-34.9    Fatty liver    Diverticulosis     Dr. Gabe Julio      Ovarian cyst     Left and Rt . Mrs. Waters      Uterine fibroid     Rik Leary, Midwife           Past Medical History:   Diagnosis Date    Adjustment disorder 08/2012    Dr. Patricia Viera. Joanie More, counselor    Allergic rhinitis 2016    Dr. Naomi Levi state, unspecified 01/25/07    Asthma 1980    Dr. Geovanna Martinez, pul. [facility] bertha inhalation.  Bilateral shoulder pain 2019    R partial RCT, OA GH and AC joint. Dr. Brando Arora Chickenpox childhood    Colon polyp     Dr. Jeanne Cleary Cutaneous wart 05/2015    Right thumb. [place]. Dr. Zeferino Fatima.  Depression     Digital mucous cyst 05/2015    Left IP joint. Dr. Genaro Washburn.  Diverticulosis 10/2018    pandiverticulosis. Dr. Gabe Julio    DJD (degenerative joint disease) of hip 09/19/12    Dr. Dylan Robles    Eczema 2016    Dr. Trino Holbrook liver 2006    Granulomatous lung disease ([facility] 2017    stable. Dr. Korina Barbosa.  Hearing aid worn 09/2014    Bilateral ears.       Hematuria 04/1988    Hepatitis B immune March 2014    Prior vaccine--[id number].  High cholesterol 1976    Hypothyroid 10/16/2006    Incidental pulmonary nodule 2019    RUL. Dr. Sera Borges.  Knee pain     having surgery on 2/12/13 left    Left hip pain 2013    Dr. Su Lopez Measles childhood    Menopause 11/2006    Mumps childhood    Ovarian cyst 02/21/2008    Left and Rt . Mrs. Antonella Waters    Pneumonia 1981    x 2    PONV (postoperative nausea and vomiting)     Scarlet fever 1971    Uterine fibroid 02/21/08    Donnell Wu, Midwife       Social History     Tobacco Use    Smoking status: Passive Smoke Exposure - Never Smoker    Smokeless tobacco: Never Used    Tobacco comment: grew up with smoker parents x 20 yrs   Vaping Use    Vaping Use: Never used   Substance Use Topics    Alcohol use: Yes     Comment: one beer every few months    Drug use: No       Outpatient Medications Marked as Taking for the 5/12/22 encounter (Office Visit) with Corina Kowalski PA-C   Medication Sig Dispense Refill    fexofenadine (ALLEGRA) 180 mg tablet Take 180 mg by mouth daily.  valsartan (DIOVAN) 80 mg tablet Take 1 Tablet by mouth daily. 90 Tablet 1    pravastatin (PRAVACHOL) 80 mg tablet Take 0.5 Tablets by mouth nightly. Indications: high cholesterol and high triglycerides 90 Tablet 1    Blood Pressure Monitor kit Check blood pressure daily. Dx I10 1 Kit 0    hydrOXYzine pamoate (VISTARIL) 25 mg capsule Take 1 Capsule by mouth nightly as needed for Itching or Anxiety. 90 Capsule 1    montelukast (SINGULAIR) 10 mg tablet TAKE 1 TABLET BY MOUTH DAILY FOR ALLERGIES AND ASTHMA. 90 Tablet 3    sertraline (ZOLOFT) 50 mg tablet Take 0.5 Tablets by mouth daily.  Indications: anxiousness associated with depression 45 Tablet 3    levothyroxine (SYNTHROID) 100 mcg tablet TAKE ONE TABLET BY MOUTH EVERY DAY (BEFORE BREAKFAST) 90 Tablet 3    ADVAIR DISKUS 500-50 mcg/dose diskus inhaler INHALE 1 PUFF TWICE A DAY 1 Inhaler 0    Vytfstiq-Tmwl-Tvkekj-Hyalur Ac 884-479-61-2 mg cap Take  by mouth.  cholecalciferol, VITAMIN D3, (VITAMIN D3) 5,000 unit tab tablet Take  by mouth daily.  multivitamin (ONE A DAY) tablet Take 1 Tab by mouth daily.  albuterol-ipratropium (DUO-NEB) 2.5 mg-0.5 mg/3 ml nebulizer solution 3 mL by Nebulization route every six (6) hours. 30 Vial 1    Nebulizer & Compressor machine 1 each by Does Not Apply route daily. 1 each 0    PROAIR HFA 90 mcg/actuation inhaler INHALE 1-2 PUFFS EVERY 4-6 HOURS AS NEEDED 1 Inhaler 5       Allergies   Allergen Reactions    Aspirin Other (comments)     Triggers asthma & causes chest tightness but takes 325 mg \"once in a while\"    Lipitor [Atorvastatin] Myalgia     Elevated CPK    Sulfa (Sulfonamide Antibiotics) Rash    Erythromycin Nausea and Vomiting    Penicillins Nausea and Vomiting     Many years ago       ROS:  ROS negative except as per HPI. PE:  Visit Vitals  /76 (BP 1 Location: Left arm, BP Patient Position: Sitting, BP Cuff Size: Adult)   Pulse 74   Temp 97.9 °F (36.6 °C) (Temporal)   Resp 18   Ht 5' 4\" (1.626 m)   Wt 214 lb 4.8 oz (97.2 kg)   SpO2 98%   BMI 36.78 kg/m²     Gen: alert, oriented, no acute distress  Head: normocephalic, atraumatic  Eyes: pupils equal round reactive to light, sclera clear, conjunctiva clear  Oral: masked  Neck: symmetric normal sized thyroid, no lymphadenopathy  Resp: no increase work of breathing, lungs clear to ausculation bilaterally, no wheezing, rales or rhonchi  CV: S1, S2 normal.  No murmurs, rubs, or gallops. Neuro: grossly intact  Skin: various nummular erythematous wheals on arms, legs and lower back  Extremities: no cyanosis or edema    No results found for this visit on 05/12/22.     Lab Results   Component Value Date/Time    WBC 5.5 04/13/2021 02:23 PM    Hemoglobin (POC) 11.3 (A) 02/12/2013 03:40 PM    HGB 13.6 04/13/2021 02:23 PM    HCT 42.0 04/13/2021 02:23 PM PLATELET 540 86/51/5998 02:23 PM    MCV 88.4 04/13/2021 02:23 PM     Lab Results   Component Value Date/Time    Sodium 142 10/22/2021 08:28 AM    Potassium 4.3 10/22/2021 08:28 AM    Chloride 107 10/22/2021 08:28 AM    CO2 31 10/22/2021 08:28 AM    Anion gap 4 (L) 10/22/2021 08:28 AM    Glucose 97 10/22/2021 08:28 AM    BUN 17 10/22/2021 08:28 AM    Creatinine 0.97 10/22/2021 08:28 AM    BUN/Creatinine ratio 18 10/22/2021 08:28 AM    GFR est AA >60 10/22/2021 08:28 AM    GFR est non-AA 58 (L) 10/22/2021 08:28 AM    Calcium 9.2 10/22/2021 08:28 AM    Bilirubin, total 0.6 10/22/2021 08:28 AM    Alk. phosphatase 86 10/22/2021 08:28 AM    Protein, total 7.0 10/22/2021 08:28 AM    Albumin 4.0 10/22/2021 08:28 AM    Globulin 3.0 10/22/2021 08:28 AM    A-G Ratio 1.3 10/22/2021 08:28 AM    ALT (SGPT) 30 10/22/2021 08:28 AM    AST (SGOT) 13 (L) 10/22/2021 08:28 AM     Lab Results   Component Value Date/Time    TSH 1.23 10/22/2021 08:28 AM    TSH, 3rd generation 0.05 (L) 04/13/2021 02:29 PM     Lab Results   Component Value Date/Time    Cholesterol, total 207 (H) 03/24/2022 08:05 AM    HDL Cholesterol 46 03/24/2022 08:05 AM    LDL, calculated 129 (H) 03/24/2022 08:05 AM    LDL, calculated 121 (H) 10/22/2021 08:28 AM    VLDL, calculated 32 03/24/2022 08:05 AM    VLDL, calculated 43 10/22/2021 08:28 AM    Triglyceride 178 (H) 03/24/2022 08:05 AM    CHOL/HDL Ratio 4.3 10/22/2021 08:28 AM       Assessment/Plan:      ICD-10-CM ICD-9-CM    1. Essential hypertension  L46 645.1 METABOLIC PANEL, BASIC      METABOLIC PANEL, BASIC   2. Urticaria  L50.9 708.9    3. Hypothyroidism due to acquired atrophy of thyroid  E03.4 244.8      246.8      1. Hypertension  Well-controlled on valsartan 80 mg (started 3/31/2022) with good tolerance  Continue same regimen  We will check BM P today    2.   Idiopathic urticaria  She has seen both her pulmonologist Dr. Janie Israel and her dermatologist Dr. Nimco Singletary  Current therapy: Xyzal every morning, Allegra midday, hydroxyzine QHS  She is having labs tomorrow by her dermatologist to include a thyroid check  She has a topical steroid cream to use from her dermatologist  I recommend that she can increase the Xyzal and Allegra to twice daily dosing, take 1 pill of each twice a day. She states both specialist have considered Xolair injections for her but are waiting the lab results    3. Hypothyroidism  She was euthyroid on current dose levothyroxine 10/2021  Of asked her to keep me informed of the lab results that she already has ordered by her dermatologist, if medication needs to be adjusted I will help her out with dosing        Health Maintenance reviewed - updated. Orders Placed This Encounter    METABOLIC PANEL, BASIC     Standing Status:   Future     Number of Occurrences:   1     Standing Expiration Date:   5/12/2023       There are no discontinued medications. Current Outpatient Medications   Medication Sig Dispense Refill    fexofenadine (ALLEGRA) 180 mg tablet Take 180 mg by mouth daily.  valsartan (DIOVAN) 80 mg tablet Take 1 Tablet by mouth daily. 90 Tablet 1    pravastatin (PRAVACHOL) 80 mg tablet Take 0.5 Tablets by mouth nightly. Indications: high cholesterol and high triglycerides 90 Tablet 1    Blood Pressure Monitor kit Check blood pressure daily. Dx I10 1 Kit 0    hydrOXYzine pamoate (VISTARIL) 25 mg capsule Take 1 Capsule by mouth nightly as needed for Itching or Anxiety. 90 Capsule 1    montelukast (SINGULAIR) 10 mg tablet TAKE 1 TABLET BY MOUTH DAILY FOR ALLERGIES AND ASTHMA. 90 Tablet 3    sertraline (ZOLOFT) 50 mg tablet Take 0.5 Tablets by mouth daily.  Indications: anxiousness associated with depression 45 Tablet 3    levothyroxine (SYNTHROID) 100 mcg tablet TAKE ONE TABLET BY MOUTH EVERY DAY (BEFORE BREAKFAST) 90 Tablet 3    ADVAIR DISKUS 500-50 mcg/dose diskus inhaler INHALE 1 PUFF TWICE A DAY 1 Inhaler 0    Aaorzahm-Xodg-Nfjlam-Hyalur Ac 570-870-37-2 mg cap Take  by mouth.      cholecalciferol, VITAMIN D3, (VITAMIN D3) 5,000 unit tab tablet Take  by mouth daily.  multivitamin (ONE A DAY) tablet Take 1 Tab by mouth daily.  albuterol-ipratropium (DUO-NEB) 2.5 mg-0.5 mg/3 ml nebulizer solution 3 mL by Nebulization route every six (6) hours. 30 Vial 1    Nebulizer & Compressor machine 1 each by Does Not Apply route daily. 1 each 0    PROAIR HFA 90 mcg/actuation inhaler INHALE 1-2 PUFFS EVERY 4-6 HOURS AS NEEDED 1 Inhaler 5    predniSONE (STERAPRED DS) 10 mg dose pack Take by mouth as directed until completion. (Patient not taking: Reported on 5/12/2022) 21 Tablet 0    cetirizine (ZYRTEC) 10 mg tablet Take 10 mg by mouth daily as needed. (Patient not taking: Reported on 5/12/2022)         Recommended healthy diet low in carbohydrates, fats, sodium and cholesterol. Recommended regular cardiovascular exercise 3-6 times per week for 30-60 minutes daily. Verbal and written instructions (see AVS) provided. Patient expresses understanding of diagnosis and treatment plan. Follow-up and Dispositions    · Return in about 3 months (around 8/12/2022) for HTN.        Future Appointments   Date Time Provider Shahla Farmer   8/15/2022  9:00 AM Corina Kowalski PA-C [facility]

## 2022-05-13 LAB
BUN SERPL-MCNC: 11 MG/DL (ref 8–27)
BUN/CREAT SERPL: 12 (ref 12–28)
CALCIUM SERPL-MCNC: 9.7 MG/DL (ref 8.7–10.3)
CHLORIDE SERPL-SCNC: 102 MMOL/L (ref 96–106)
CO2 SERPL-SCNC: 20 MMOL/L (ref 20–29)
CREAT SERPL-MCNC: 0.89 MG/DL (ref 0.57–1)
EGFR: 71 ML/MIN/1.73
GLUCOSE SERPL-MCNC: 62 MG/DL (ref 65–99)
POTASSIUM SERPL-SCNC: 4.3 MMOL/L (ref 3.5–5.2)
SODIUM SERPL-SCNC: 143 MMOL/L (ref 134–144)

## 2022-05-19 NOTE — PROGRESS NOTES
Luz Maria msg sentThe blood sugar was low, which would have corrected when you ate.   Otherwise the lab was normal.

## 2022-06-25 DIAGNOSIS — I10 ESSENTIAL HYPERTENSION: ICD-10-CM

## 2022-06-26 RX ORDER — VALSARTAN 80 MG/1
80 TABLET ORAL DAILY
Qty: 90 TABLET | Refills: 0 | Status: SHIPPED | OUTPATIENT
Start: 2022-06-26 | End: 2022-09-22

## 2022-08-15 ENCOUNTER — OFFICE VISIT (OUTPATIENT)
Dept: INTERNAL MEDICINE CLINIC | Age: 67
End: 2022-08-15
Payer: COMMERCIAL

## 2022-08-15 VITALS
BODY MASS INDEX: 33.99 KG/M2 | SYSTOLIC BLOOD PRESSURE: 109 MMHG | RESPIRATION RATE: 18 BRPM | WEIGHT: 199.1 LBS | HEIGHT: 64 IN | OXYGEN SATURATION: 97 % | HEART RATE: 68 BPM | DIASTOLIC BLOOD PRESSURE: 71 MMHG

## 2022-08-15 DIAGNOSIS — E78.5 DYSLIPIDEMIA: ICD-10-CM

## 2022-08-15 DIAGNOSIS — I10 ESSENTIAL HYPERTENSION: Primary | ICD-10-CM

## 2022-08-15 DIAGNOSIS — R73.02 IMPAIRED GLUCOSE TOLERANCE: ICD-10-CM

## 2022-08-15 DIAGNOSIS — L50.9 URTICARIA: ICD-10-CM

## 2022-08-15 DIAGNOSIS — E03.4 HYPOTHYROIDISM DUE TO ACQUIRED ATROPHY OF THYROID: ICD-10-CM

## 2022-08-15 PROCEDURE — 1123F ACP DISCUSS/DSCN MKR DOCD: CPT | Performed by: PHYSICIAN ASSISTANT

## 2022-08-15 PROCEDURE — 99214 OFFICE O/P EST MOD 30 MIN: CPT | Performed by: PHYSICIAN ASSISTANT

## 2022-08-15 NOTE — PROGRESS NOTES
Aki Boyd presents today at the clinic for follow up.      Chief Complaint   Patient presents with    Follow-up        Wt Readings from Last 3 Encounters:   08/15/22 199 lb 1.6 oz (90.3 kg)   05/12/22 214 lb 4.8 oz (97.2 kg)   04/25/22 212 lb 8.4 oz (96.4 kg)     Temp Readings from Last 3 Encounters:   05/12/22 97.9 °F (36.6 °C) (Temporal)   04/25/22 98 °F (36.7 °C)   03/31/22 97.1 °F (36.2 °C) (Temporal)     BP Readings from Last 3 Encounters:   05/12/22 128/76   04/25/22 (!) 145/76   03/31/22 (!) 146/92     Pulse Readings from Last 3 Encounters:   05/12/22 74   04/25/22 69   03/31/22 67       Health Maintenance Due   Topic    Bone Densitometry (Dexa) Screening     COVID-19 Vaccine (3 - Booster for Waller Peter series)         Learning Assessment:  :     Learning Assessment 1/27/2015   PRIMARY LEARNER Patient   HIGHEST LEVEL OF EDUCATION - PRIMARY LEARNER  > 4 YEARS OF COLLEGE   BARRIERS PRIMARY LEARNER HEARING   CO-LEARNER CAREGIVER No   PRIMARY LANGUAGE ENGLISH   LEARNER PREFERENCE PRIMARY DEMONSTRATION     VIDEOS     READING     LISTENING   ANSWERED BY patient   RELATIONSHIP SELF       Depression Screening:  :     3 most recent PHQ Screens 8/15/2022   Little interest or pleasure in doing things Not at all   Feeling down, depressed, irritable, or hopeless Not at all   Total Score PHQ 2 0   Trouble falling or staying asleep, or sleeping too much -   Feeling tired or having little energy -   Poor appetite, weight loss, or overeating -   Feeling bad about yourself - or that you are a failure or have let yourself or your family down -   Trouble concentrating on things such as school, work, reading, or watching TV -   Moving or speaking so slowly that other people could have noticed; or the opposite being so fidgety that others notice -   Thoughts of being better off dead, or hurting yourself in some way -   PHQ 9 Score -   How difficult have these problems made it for you to do your work, take care of your home and get along with others -       Fall Risk Assessment:  :     Fall Risk Assessment, last 12 mths 5/12/2022   Able to walk? Yes   Fall in past 12 months? 0   Do you feel unsteady? 0   Are you worried about falling 0       Abuse Screening:  :     Abuse Screening Questionnaire 5/12/2022 3/31/2022 4/13/2021 7/2/2019   Do you ever feel afraid of your partner? N N N N   Are you in a relationship with someone who physically or mentally threatens you? N N N N   Is it safe for you to go home? Deion Laura       Coordination of Care Questionnaire:  :     1. Have you been to the ER, urgent care clinic since your last visit? Hospitalized since your last visit? No    2. Have you seen or consulted any other health care providers outside of the [address] [place] [address] since your last visit? Include any pap smears or colon screening.  No

## 2022-08-15 NOTE — PROGRESS NOTES
HPI:  78 y.o.  presents for follow up appointment. No hospital, ER or specialist visits since last primary care visit except as noted below. Last visit 5/12/22    HTN  On valsartan 80 mg (started 3/31/22) with good tolerance  - home monitoring with her new monitor shows 130/70-80. No history of heart disease or stroke. No chest pain, no shortness of breath, no headaches, no lower extremity swelling. Hyperlipidemia  On pravastatin 40 mg (increased from 40 mg to 80 mg 3/31/22, but my prescription said 1/2 tab so she has been taking 40 mg still)  Last labs 3/24/22 showed total 207 (max 250), , HDL 46,  (max 158)  -of note, with dietary changes below, she has lost 17-18 lbs since 2/2022     Urticaria  3/31/22: C/O hives in the mornings x 3 wks  Reports same thing happened several yrs ago when under extreme stress, had work up and different meds with not relief, and resolved when she changed jobs  Current job is very stressful, so she feels that is causing the hives  She just started zyrtec taking periodically since the hives started  No insomnia, has not used hydroxyzine  Plan: Suspect secondary due to stress  switch zyrtec to xyzal, take qAM, may add hydroxyzine QHS if needed  Stress reduction techniques  She is on sertraline 25 mg daily, on this dose for about 20 years.   Consider increased dose if urticaria continues at next visit if secondary to stress is still being considered   -5/12/22: she was taking xyzal plus zyrtec and hydroxyzine at night  She started having wheezing, tight chest, like an asthma attack, and went to ER 4/25/22 and had a steroid dose pack with complete resolution but sxs returned after completing the steroid, and had F/U with her pulmonologist - Dr Diana Veliz, who recommended xyzal qAM, benadryl mid day instead of zyrtec,  hydroxyzine QHS, and to see her Derm, continue same Molly and Rosa Mireles  She wanted a \"full blood panel\"  Stop the benadryl since makes her drowsy and switch to allegra   --Now taking: Xyzal qAM, allegra mid day, hydroxyzine QHS  Derm also recommended a topical steroid cream that is on order from the pharmacy  TODAY - 8/15/22 - she has adjusted her diet to eliminate nightshades and foods that have histamines  -she declined Xolair since she did not want to be on an injeciton  -she self tested by eating pizza and had a reaction - so she needed prednisone   -she has been keeping a diary of foods that cause a reaction  -she had allergy shots in the past after having had 3-4 skin tests, but during the build up of allergy shots she would get sick and have to stop  -current regimen - pepcid BID, Allegra qAM, Zyrtec afternoon, benadryl QHS  -continues on her same Advair and Singulair    Hypothyroidism  On levo 100 mcg  Last TSH 1.23 on 10/2021    Prediabetes  History of A1c 5.8% in 3/2017  Most recent A1c's have been normal, 5.2% in 10/2021    Patient Active Problem List    Diagnosis    Chronic renal disease, stage III    Colon polyp     Dr. Andrzej Bragg      Severe obesity ([address]    Bilateral shoulder pain     R partial RCT, OA GH and AC joint. Dr. Austin Warren      Hyperglycemia    History of total hip replacement    Granulomatous lung disease ([address]     stable. Dr. Mayur Strong. Family history of osteoporosis    Family history of blood clots    Family history of dementia    Family history of stroke    Family history of diabetes mellitus (DM)    Family history of colon cancer     father      Family history of carcinoma in situ of anal canal     mom        Family history of osteoporosis in mother     and maternal grandmother      Hypothyroidism due to acquired atrophy of thyroid    Major depression in complete remission ([address]     stable on Zoloft      Dyslipidemia    Vitamin D deficiency    Eczema     Dr. Yasmine Narayan disorder     Dr. Shelly Teran.   Christy Raymundo, counselor      Obesity, Class I, BMI 30-34.9    Fatty liver Diverticulosis     Dr. Rakan Carter      Ovarian cyst     Left and Rt . Mrs. Waters      Uterine fibroid     Lynda Bourgeois, Midwife           Past Medical History:   Diagnosis Date    Adjustment disorder 08/2012    Dr. Sd Montes. Ioana Bradford, counselor    Allergic rhinitis 2016    Dr. Syed Tello state, unspecified 01/25/07    Asthma 1980    Dr. Michael Thurman, pulm. [id number] [facility] bertha inhalation. Bilateral shoulder pain 2019    R partial RCT, OA GH and AC joint. Dr. Donald Bello    Chickenpox childhood    Colon polyp     Dr. Edouard Marinelli    Cutaneous wart 05/2015    Right thumb. [place]. Dr. Henrry Canales. Depression     Digital mucous cyst 05/2015    Left IP joint. Dr. Caryl Patten. Diverticulosis 10/2018    pandiverticulosis. Dr. Rakan Carter    DJD (degenerative joint disease) of hip 09/19/12    Dr. Jael Pearl    Eczema 2016    Dr. Tiffany Dietrich liver 2006    Granulomatous lung disease ([facility] 2017    stable. Dr. Severino Degree. Hearing aid worn 09/2014    Bilateral ears. Hematuria 04/1988    Hepatitis B immune March 2014    Prior vaccine--[id number]. High cholesterol 1976    Hypothyroid 10/16/2006    Incidental pulmonary nodule 2019    RUL. Dr. Michael Thurman. Knee pain     having surgery on 2/12/13 left    Left hip pain 2013    Dr. Donald Bello    Measles childhood    Menopause 11/2006    Mumps childhood    Ovarian cyst 02/21/2008    Left and Rt . Mrs. Antonella Waters    Pneumonia 1981    x 2    PONV (postoperative nausea and vomiting)     Scarlet fever 1971    Uterine fibroid 02/21/08    Jonathan Hammondife       Social History     Tobacco Use    Smoking status: Passive Smoke Exposure - Never Smoker    Smokeless tobacco: Never    Tobacco comments:     grew up with smoker parents x 20 yrs   Vaping Use    Vaping Use: Never used   Substance Use Topics    Alcohol use: Yes     Comment: one beer every few months    Drug use: No       Outpatient Medications Marked as Taking for the 8/15/22 encounter (Office Visit) with Corina Kowalski PA-C   Medication Sig Dispense Refill    levothyroxine (SYNTHROID) 100 mcg tablet TAKE ONE TABLET BY MOUTH EVERY DAY (BEFORE BREAKFAST) 90 Tablet 0    sertraline (ZOLOFT) 50 mg tablet TAKE 1/2 TABLET DAILY 45 Tablet 0    valsartan (DIOVAN) 80 mg tablet Take 1 Tablet by mouth daily. 90 Tablet 0    fexofenadine (ALLEGRA) 180 mg tablet Take 180 mg by mouth daily. predniSONE (STERAPRED DS) 10 mg dose pack Take by mouth as directed until completion. 21 Tablet 0    pravastatin (PRAVACHOL) 80 mg tablet Take 0.5 Tablets by mouth nightly. Indications: high cholesterol and high triglycerides 90 Tablet 1    Blood Pressure Monitor kit Check blood pressure daily. Dx I10 1 Kit 0    hydrOXYzine pamoate (VISTARIL) 25 mg capsule Take 1 Capsule by mouth nightly as needed for Itching or Anxiety. 90 Capsule 1    montelukast (SINGULAIR) 10 mg tablet TAKE 1 TABLET BY MOUTH DAILY FOR ALLERGIES AND ASTHMA. 90 Tablet 3    ADVAIR DISKUS 500-50 mcg/dose diskus inhaler INHALE 1 PUFF TWICE A DAY 1 Inhaler 0    Psiiombf-Cglq-Jtuyfw-Hyalur Ac 690-594-61-2 mg cap Take  by mouth. cetirizine (ZYRTEC) 10 mg tablet Take 10 mg by mouth daily as needed. cholecalciferol, VITAMIN D3, (VITAMIN D3) 5,000 unit tab tablet Take  by mouth daily. multivitamin (ONE A DAY) tablet Take 1 Tab by mouth daily. albuterol-ipratropium (DUO-NEB) 2.5 mg-0.5 mg/3 ml nebulizer solution 3 mL by Nebulization route every six (6) hours. 30 Vial 1    Nebulizer & Compressor machine 1 each by Does Not Apply route daily.  1 each 0    PROAIR HFA 90 mcg/actuation inhaler INHALE 1-2 PUFFS EVERY 4-6 HOURS AS NEEDED 1 Inhaler 5       Allergies   Allergen Reactions    Aspirin Other (comments)     Triggers asthma & causes chest tightness but takes 325 mg \"once in a while\"    Lipitor [Atorvastatin] Myalgia     Elevated CPK    Sulfa (Sulfonamide Antibiotics) Rash    Erythromycin Nausea and Vomiting Penicillins Nausea and Vomiting     Many years ago       ROS:  ROS negative except as per HPI. PE:  Visit Vitals  /71 (BP 1 Location: Right arm, BP Patient Position: Sitting, BP Cuff Size: Large adult)   Pulse 68   Resp 18   Ht 5' 4\" (1.626 m)   Wt 199 lb 1.6 oz (90.3 kg)   SpO2 97%   BMI 34.18 kg/m²     Gen: alert, oriented, no acute distress  Head: normocephalic, atraumatic  Eyes: pupils equal round reactive to light, sclera clear, conjunctiva clear  Oral: masked  Neck: supple  Resp: no increase work of breathing, lungs clear to ausculation bilaterally, no wheezing, rales or rhonchi  CV: S1, S2 normal.  No murmurs, rubs, or gallops. Abd: soft, not tender, not distended. Normal bowel sounds. Neuro: grossly intact  Skin: no lesion or rash  Extremities: no cyanosis or edema    No results found for this visit on 08/15/22.     Results for orders placed or performed in visit on 02/05/83   METABOLIC PANEL, BASIC   Result Value Ref Range    Glucose 62 (L) 65 - 99 mg/dL    BUN 11 8 - 27 mg/dL    Creatinine 0.89 0.57 - 1.00 mg/dL    eGFR 71 >59 mL/min/1.73    BUN/Creatinine ratio 12 12 - 28    Sodium 143 134 - 144 mmol/L    Potassium 4.3 3.5 - 5.2 mmol/L    Chloride 102 96 - 106 mmol/L    CO2 20 20 - 29 mmol/L    Calcium 9.7 8.7 - 10.3 mg/dL     Lab Results   Component Value Date/Time    Cholesterol, total 207 (H) 03/24/2022 08:05 AM    HDL Cholesterol 46 03/24/2022 08:05 AM    LDL, calculated 129 (H) 03/24/2022 08:05 AM    LDL, calculated 121 (H) 10/22/2021 08:28 AM    VLDL, calculated 32 03/24/2022 08:05 AM    VLDL, calculated 43 10/22/2021 08:28 AM    Triglyceride 178 (H) 03/24/2022 08:05 AM    CHOL/HDL Ratio 4.3 10/22/2021 08:28 AM     Lab Results   Component Value Date/Time    WBC 5.5 04/13/2021 02:23 PM    Hemoglobin (POC) 11.3 (A) 02/12/2013 03:40 PM    HGB 13.6 04/13/2021 02:23 PM    HCT 42.0 04/13/2021 02:23 PM    PLATELET 298 70/57/6875 02:23 PM    MCV 88.4 04/13/2021 02:23 PM     Lab Results Component Value Date/Time    TSH 1.23 10/22/2021 08:28 AM    TSH, 3rd generation 0.05 (L) 04/13/2021 02:29 PM     Lab Results   Component Value Date/Time    Hemoglobin A1c 5.2 10/22/2021 08:28 AM         Assessment/Plan:      ICD-10-CM ICD-9-CM    1. Essential hypertension  D17 617.7 METABOLIC PANEL, COMPREHENSIVE      2. Dyslipidemia  E78.5 272.4 LIPID PANEL      METABOLIC PANEL, COMPREHENSIVE      3. Urticaria  L50.9 708.9       4. Hypothyroidism due to acquired atrophy of thyroid  E03.4 244.8      246.8       5. Impaired glucose tolerance  R73.02 790.22         1. Hypertension  Controlled on valsartan 80 mg, continue same    2. Hyperlipidemia  Current therapy: Pravastatin 40 mg  I had increased her to 80 mg, but hypocrite prescription stated half a tablet so she has continued on 40 mg this year  She has made dietary changes due to urticaria, see below, and has lost 17-18 pounds this year  Will check lipid panel now that she has had significant weight loss. She has been on the 40 mg dosage of pravastatin. We will make further adjustments after lab results    3. Urticaria  She had been working with her pulmonologist and dermatologist  She has regimen of Pepcid twice daily, [place] Tran in the morning, Zyrtec in the afternoon, Benadryl at bedtime  She continues on her Advair and Singulair  She has adjusted her diet and avoided nightshades, such as eggplant and tomatoes and has realized specific food triggers, in the process she has lost over 17 lbs this year  As she does her food experiment and notices a reaction, she will take prednisone from her pulmonologist, and I asked her to use caution with repeated steroids as well as triggering her reaction  She had allergy skin test in the remote past but difficulty with allergy injections and declines allergy testing again at this time  She is happy with her current regimen will follow-up with pulmonary as scheduled    4.   Hypothyroidism  Euthyroid on levothyroxine 100 mcg, continue same, recheck at next appointment    5. History of impaired glucose tolerance  A1c 5.8% in 2017  Most recent A1c's have been normal, 5.2% in 10/2021        Health Maintenance reviewed - updated. Orders Placed This Encounter    LIPID PANEL     Standing Status:   Future     Standing Expiration Date:   3/95/8448    METABOLIC PANEL, COMPREHENSIVE     Standing Status:   Future     Standing Expiration Date:   8/15/2023       Medications Discontinued During This Encounter   Medication Reason    hydrOXYzine pamoate (VISTARIL) 25 mg capsule Alternate Therapy       Current Outpatient Medications   Medication Sig Dispense Refill    levothyroxine (SYNTHROID) 100 mcg tablet TAKE ONE TABLET BY MOUTH EVERY DAY (BEFORE BREAKFAST) 90 Tablet 0    sertraline (ZOLOFT) 50 mg tablet TAKE 1/2 TABLET DAILY 45 Tablet 0    valsartan (DIOVAN) 80 mg tablet Take 1 Tablet by mouth daily. 90 Tablet 0    fexofenadine (ALLEGRA) 180 mg tablet Take 180 mg by mouth daily. predniSONE (STERAPRED DS) 10 mg dose pack Take by mouth as directed until completion. 21 Tablet 0    pravastatin (PRAVACHOL) 80 mg tablet Take 0.5 Tablets by mouth nightly. Indications: high cholesterol and high triglycerides 90 Tablet 1    Blood Pressure Monitor kit Check blood pressure daily. Dx I10 1 Kit 0    montelukast (SINGULAIR) 10 mg tablet TAKE 1 TABLET BY MOUTH DAILY FOR ALLERGIES AND ASTHMA. 90 Tablet 3    ADVAIR DISKUS 500-50 mcg/dose diskus inhaler INHALE 1 PUFF TWICE A DAY 1 Inhaler 0    Ccywczaf-Ltdp-Yrtasu-Hyalur Ac 770-329-61-2 mg cap Take  by mouth. cetirizine (ZYRTEC) 10 mg tablet Take 10 mg by mouth daily as needed. cholecalciferol, VITAMIN D3, (VITAMIN D3) 5,000 unit tab tablet Take  by mouth daily. multivitamin (ONE A DAY) tablet Take 1 Tab by mouth daily. albuterol-ipratropium (DUO-NEB) 2.5 mg-0.5 mg/3 ml nebulizer solution 3 mL by Nebulization route every six (6) hours.  30 Vial 1    Nebulizer & Compressor machine 1 each by Does Not Apply route daily. 1 each 0    PROAIR HFA 90 mcg/actuation inhaler INHALE 1-2 PUFFS EVERY 4-6 HOURS AS NEEDED 1 Inhaler 5       Recommended healthy diet low in carbohydrates, fats, sodium and cholesterol. Recommended regular cardiovascular exercise 3-6 times per week for 30-60 minutes daily. Verbal and written instructions (see AVS) provided. Patient expresses understanding of diagnosis and treatment plan. Follow-up and Dispositions    Return in about 3 months (around 11/15/2022) for HTN; fasting lab appt soon.        Future Appointments   Date Time Provider Shahla Farmer   8/19/2022  8:40 AM LAB ONLY [facility] BS AMB   11/21/2022  8:30 AM Corina Kowalski, PAMalindaC [facility]IS[facility] BS AMB

## 2022-08-19 ENCOUNTER — LAB ONLY (OUTPATIENT)
Dept: INTERNAL MEDICINE CLINIC | Age: 67
End: 2022-08-19

## 2022-08-19 DIAGNOSIS — E78.5 DYSLIPIDEMIA: ICD-10-CM

## 2022-08-19 DIAGNOSIS — I10 ESSENTIAL HYPERTENSION: ICD-10-CM

## 2022-08-20 LAB
ALB/GLOBRATIO, 58C: 2.1 (CALC) (ref 1–2.5)
ALBUMIN SERPL-MCNC: 4.6 G/DL (ref 3.6–5.1)
ALKALINE PHOSPHATASE, TOTAL, 25002000: 74 U/L (ref 37–153)
ALT SERPL-CCNC: 21 U/L (ref 6–29)
AST SERPL W P-5'-P-CCNC: 18 U/L (ref 10–35)
BILIRUB SERPL-MCNC: 0.6 MG/DL (ref 0.2–1.2)
BUN SERPL-MCNC: 15 MG/DL (ref 7–25)
BUN/CREATININE RATIO,BUCR: NORMAL (CALC) (ref 6–22)
CALCIUM SERPL-MCNC: 9.6 MG/DL (ref 8.6–10.4)
CHLORIDE SERPL-SCNC: 105 MMOL/L (ref 98–110)
CHOL/HDL RATIO,CHHDX: 4.3 (CALC)
CHOLEST SERPL-MCNC: 210 MG/DL
CO2 SERPL-SCNC: 28 MMOL/L (ref 20–32)
CREAT SERPL-MCNC: 0.96 MG/DL (ref 0.5–1.05)
EGFR: 65 ML/MIN/1.73M2
GLOBULIN,GLOB: 2.2 G/DL (CALC) (ref 1.9–3.7)
GLUCOSE SERPL-MCNC: 92 MG/DL (ref 65–99)
HDLC SERPL-MCNC: 49 MG/DL
LDL-CHOLESTEROL: 122 MG/DL (CALC)
NON-HDL CHOLESTEROL, 011976: 161 MG/DL (CALC)
POTASSIUM SERPL-SCNC: 4.4 MMOL/L (ref 3.5–5.3)
PROT SERPL-MCNC: 6.8 G/DL (ref 6.1–8.1)
SODIUM SERPL-SCNC: 142 MMOL/L (ref 135–146)
TRIGL SERPL-MCNC: 243 MG/DL (ref ?–150)

## 2022-09-22 DIAGNOSIS — I10 ESSENTIAL HYPERTENSION: ICD-10-CM

## 2022-09-22 DIAGNOSIS — E78.5 DYSLIPIDEMIA: Primary | ICD-10-CM

## 2022-09-22 RX ORDER — VALSARTAN 80 MG/1
80 TABLET ORAL DAILY
Qty: 90 TABLET | Refills: 1 | Status: SHIPPED | OUTPATIENT
Start: 2022-09-22

## 2022-09-22 RX ORDER — PRAVASTATIN SODIUM 80 MG/1
80 TABLET ORAL
Qty: 90 TABLET | Refills: 1 | Status: SHIPPED | OUTPATIENT
Start: 2022-09-22

## 2022-09-22 NOTE — PROGRESS NOTES
[employer] message sent  The cholesterol remains elevated, so I recommend going up on the pravastatin to 80 mg. I will be sure to send in the appropriate prescription this time. Remainder of labs look good. Recheck in 3 months.

## 2022-10-10 DIAGNOSIS — E03.4 HYPOTHYROIDISM DUE TO ACQUIRED ATROPHY OF THYROID: ICD-10-CM

## 2022-10-10 NOTE — TELEPHONE ENCOUNTER
PCP: Princella Severance, PA-C    Last appt: 8/15/2022  Future Appointments   Date Time Provider Shahla Farmer   10/13/2022 11:30 AM Corina Kowalski PA-C [facility] AMB   11/21/2022  8:30 AM Corina Kowalski PA-C [facility] AMB   12/28/2022  1:00 PM Adrien Jones MD [facility] AMB       Requested Prescriptions     Pending Prescriptions Disp Refills    levothyroxine (SYNTHROID) 100 mcg tablet 90 Tablet 0         Other Comments:

## 2022-10-10 NOTE — TELEPHONE ENCOUNTER
----- Message from [employer] Margaret Case [address] sent at 10/10/2022 10:22 AM EDT -----  Subject: Refill Request    QUESTIONS  Name of Medication? levothyroxine (SYNTHROID) 100 mcg tablet  Patient-reported dosage and instructions? 1 pill daily   How many days do you have left? 0  Preferred Pharmacy? [id number] phone number (if available)? [phone]  Additional Information for Provider? Needs refills. The patient needs this   filled by Thursday this week please because she is going out of town   Friday morning & will be out of the medication at that time. Please call   pt to update her on the request.   ---------------------------------------------------------------------------  --------------  CALL BACK INFO  What is the best way for the office to contact you? OK to leave message on   voicemail  Preferred Call Back Phone Number? [phone]  ---------------------------------------------------------------------------  --------------  SCRIPT ANSWERS  Relationship to Patient?  Self

## 2022-10-13 ENCOUNTER — OFFICE VISIT (OUTPATIENT)
Dept: INTERNAL MEDICINE CLINIC | Age: 67
End: 2022-10-13
Payer: COMMERCIAL

## 2022-10-13 VITALS
DIASTOLIC BLOOD PRESSURE: 88 MMHG | BODY MASS INDEX: 33.6 KG/M2 | RESPIRATION RATE: 18 BRPM | WEIGHT: 196.8 LBS | HEART RATE: 70 BPM | HEIGHT: 64 IN | OXYGEN SATURATION: 97 % | TEMPERATURE: 98.5 F | SYSTOLIC BLOOD PRESSURE: 134 MMHG

## 2022-10-13 DIAGNOSIS — Z23 NEEDS FLU SHOT: ICD-10-CM

## 2022-10-13 DIAGNOSIS — F43.23 ADJUSTMENT DISORDER WITH MIXED ANXIETY AND DEPRESSED MOOD: Primary | ICD-10-CM

## 2022-10-13 PROCEDURE — 90694 VACC AIIV4 NO PRSRV 0.5ML IM: CPT | Performed by: PHYSICIAN ASSISTANT

## 2022-10-13 PROCEDURE — 99213 OFFICE O/P EST LOW 20 MIN: CPT | Performed by: PHYSICIAN ASSISTANT

## 2022-10-13 PROCEDURE — 1123F ACP DISCUSS/DSCN MKR DOCD: CPT | Performed by: PHYSICIAN ASSISTANT

## 2022-10-13 PROCEDURE — 90471 IMMUNIZATION ADMIN: CPT | Performed by: PHYSICIAN ASSISTANT

## 2022-10-13 RX ORDER — EPINEPHRINE 0.3 MG/.3ML
INJECTION SUBCUTANEOUS
COMMUNITY
Start: 2022-10-11

## 2022-10-13 RX ORDER — OMALIZUMAB 150 MG/ML
INJECTION, SOLUTION SUBCUTANEOUS
COMMUNITY
Start: 2022-10-12

## 2022-10-13 RX ORDER — BUSPIRONE HYDROCHLORIDE 5 MG/1
TABLET ORAL
Qty: 270 TABLET | Refills: 1 | Status: SHIPPED | OUTPATIENT
Start: 2022-10-13

## 2022-10-13 NOTE — PATIENT INSTRUCTIONS
Vaccine Information Statement    Influenza (Flu) Vaccine (Inactivated or Recombinant): What You Need to Know    Many vaccine information statements are available in Japanese and other languages. See www.immunize.org/vis. Hojas de información sobre vacunas están disponibles en español y en muchos otros idiomas. Visite www.immunize.org/vis. 1. Why get vaccinated? Influenza vaccine can prevent influenza (flu). Flu is a contagious disease that spreads around the [place] [facility] every year, usually between October and May. Anyone can get the flu, but it is more dangerous for some people. Infants and young children, people 72 years and older, pregnant people, and people with certain health conditions or a weakened immune system are at greatest risk of flu complications. Pneumonia, bronchitis, sinus infections, and ear infections are examples of flu-related complications. If you have a medical condition, such as heart disease, cancer, or diabetes, flu can make it worse. Flu can cause fever and chills, sore throat, muscle aches, fatigue, cough, headache, and runny or stuffy nose. Some people may have vomiting and diarrhea, though this is more common in children than adults. In an average year, thousands of people in the [facility] die from flu, and many more are hospitalized. Flu vaccine prevents millions of illnesses and flu-related visits to the doctor each year. 2. Influenza vaccines     CDC recommends everyone 6 months and older get vaccinated every flu season. Children 6 months through 6years of age may need 2 doses during a single flu season. Everyone else needs only 1 dose each flu season. It takes about 2 weeks for protection to develop after vaccination. There are many flu viruses, and they are always changing. Each year a new flu vaccine is made to protect against the influenza viruses believed to be likely to cause disease in the upcoming flu season.  Even when the vaccine doesnt exactly match these viruses, it may still provide some protection. Influenza vaccine does not cause flu. Influenza vaccine may be given at the same time as other vaccines. 3. Talk with your health care provider    Tell your vaccination provider if the person getting the vaccine:  Has had an allergic reaction after a previous dose of influenza vaccine, or has any severe, life-threatening allergies   Has ever had Guillain-Barré Syndrome (also called GBS)    In some cases, your health care provider may decide to postpone influenza vaccination until a future visit. Influenza vaccine can be administered at any time during pregnancy. People who are or will be pregnant during influenza season should receive inactivated influenza vaccine. People with minor illnesses, such as a cold, may be vaccinated. People who are moderately or severely ill should usually wait until they recover before getting influenza vaccine. Your health care provider can give you more information. 4. Risks of a vaccine reaction    Soreness, redness, and swelling where the shot is given, fever, muscle aches, and headache can happen after influenza vaccination. There may be a very small increased risk of Guillain-Barré Syndrome (GBS) after inactivated influenza vaccine (the flu shot). Surendra Lewis children who get the flu shot along with pneumococcal vaccine (PCV13) and/or DTaP vaccine at the same time might be slightly more likely to have a seizure caused by fever. Tell your health care provider if a child who is getting flu vaccine has ever had a seizure. People sometimes faint after medical procedures, including vaccination. Tell your provider if you feel dizzy or have vision changes or ringing in the ears. As with any medicine, there is a very remote chance of a vaccine causing a severe allergic reaction, other serious injury, or death. 5. What if there is a serious problem?     An allergic reaction could occur after the vaccinated person leaves the clinic. If you see signs of a severe allergic reaction (hives, swelling of the face and throat, difficulty breathing, a fast heartbeat, dizziness, or weakness), call 9-1-1 and get the person to the nearest hospital.    For other signs that concern you, call your health care provider. Adverse reactions should be reported to the Vaccine Adverse Event Reporting System (VAERS). Your health care provider will usually file this report, or you can do it yourself. Visit the VAERS website at www.vaers. [facility].gov or call 2-732.685.1265. VAERS is only for reporting reactions, and VAERS staff members do not give medical advice. 6. The National Vaccine Injury Compensation Program    The [facility] Vaccine Injury Compensation Program (VICP) is a federal program that was created to compensate people who may have been injured by certain vaccines. Claims regarding alleged injury or death due to vaccination have a time limit for filing, which may be as short as two years. Visit the VICP website at www.[facility].gov/vaccinecompensation or call 5-186.301.6749 to learn about the program and about filing a claim. 7. How can I learn more? Ask your health care provider. Call your local or state health department. Visit the website of the Food and Drug Administration (FDA) for vaccine package inserts and additional information at www.fda.gov/vaccines-blood-biologics/vaccines. Contact the Centers for Disease Control and Prevention (CDC): Call 5-277.520.3709 (1-800-CDC-INFO) or  Visit CDCs influenza website at www.cdc.gov/flu. Vaccine Information Statement   Inactivated Influenza Vaccine   8/6/2021  42 VONNIE Obrien [id number]   Department of Health and Human Services  Centers for Disease Control and Prevention    Office Use Only

## 2022-10-13 NOTE — PROGRESS NOTES
HPI:  78 y.o.  presents for follow up appointment. No hospital, ER or specialist visits since last primary care visit except as noted below.     Stress, anxiety  On sertraline 25 mg for mood disorder, started 17 yrs ago when her mother passed away  She felt like her head was spinning on zoloft 50 mg in the past, she tried again recently and felt the same way; could not \"think straight\" on paxil prior to the zoloft  She feels stress from work and from the hives and itching so much  Her partner is on Buspar, so she tried buspar 5 mg BID over the last 4 days and already feels her anxiety is under control more and could focus at work    3 most recent [address] 10/13/2022   [address] interest or pleasure in doing things Not at all   Feeling down, depressed, irritable, or hopeless Not at all   Total Score PHQ 2 0   Trouble falling or staying asleep, or sleeping too much -   Feeling tired or having little energy -   Poor appetite, weight loss, or overeating -   Feeling bad about yourself - or that you are a failure or have let yourself or your family down -   Trouble concentrating on things such as school, work, reading, or watching TV -   Moving or speaking so slowly that other people could have noticed; or the opposite being so fidgety that others notice -   Thoughts of being better off dead, or hurting yourself in some way -   PHQ 9 Score -   How difficult have these problems made it for you to do your work, take care of your home and get along with others -       GAD7 score: 19  Feeling nervous, anxious or on edge: 3    Not being able to stop or control worrying:  3  Worrying too much about different things:  3  Trouble relaxing: 3   Being so restless that it is hard to sit still: 2  Becoming easily annoyed or irritable:  3  Feeling afraid as if something awful might happen: 1  If any of the above were scored more than 0, how difficult have these problems made it for you to do your work, take care of things at home, or get along with other people? 1  Not at all             Somewhat difficult            Very difficult             Extremely difficult        She is process of insurance approval for Xolair and may begin this soon    Patient Active Problem List    Diagnosis    Chronic renal disease, stage III    Colon polyp     Dr. Glenn Ramos      Severe obesity ([facility] [address]    Bilateral shoulder pain     R partial RCT, OA GH and AC joint. Dr. Prachi Rowe      Hyperglycemia    History of total hip replacement    Granulomatous lung disease ([facility] [address]     stable. Dr. Bernarda Davis. Family history of osteoporosis    Family history of blood clots    Family history of dementia    Family history of stroke    Family history of diabetes mellitus (DM)    Family history of colon cancer     father      Family history of carcinoma in situ of anal canal     mom        Family history of osteoporosis in mother     and maternal grandmother      Hypothyroidism due to acquired atrophy of thyroid    Major depression in complete remission ([facility] [address]     stable on Zoloft      Dyslipidemia    Vitamin D deficiency    Eczema     Dr. Alois Moritz disorder     Dr. Juliet Lazar. Jeanine Momin, counselor      Obesity, Class I, BMI 30-34.9    Fatty liver    Diverticulosis     Dr. Edy Angela      Ovarian cyst     Left and Rt . Mrs. Waters      Uterine fibroid     Fernie Brannon, Midwife           Past Medical History:   Diagnosis Date    Adjustment disorder 08/2012    Dr. Juliet Lazar. Jeanine Momin, counselor    Allergic rhinitis 2016    Dr. Junior Shankar state, unspecified 01/25/07    Asthma 1980    Dr. Calvin Rivas, pul. [address] bertha inhalation. Bilateral shoulder pain 2019    R partial RCT, OA GH and AC joint. Dr. Prachi Rowe    Chickenpox childhood    Colon polyp     Dr. Glenn Ramos    Cutaneous wart 05/2015    Right thumb. [facility]. Dr. Emma Negro. Depression     Digital mucous cyst 05/2015    Left IP joint. Dr. Borja Rm. Diverticulosis 10/2018    pandiverticulosis. Dr. Marcel Ross    DJD (degenerative joint disease) of hip 09/19/12    Dr. Tiana Dove    Eczema 2016    Dr. Aishwarya Peterson liver 2006    Granulomatous lung disease ([facility] [address] 2017    stable. Dr. Kary Linder. Hearing aid worn 09/2014    Bilateral ears. Hematuria 04/1988    Hepatitis B immune March 2014    Prior vaccine--[id number]. High cholesterol 1976    Hypothyroid 10/16/2006    Incidental pulmonary nodule 2019    RUL. Dr. Xi Edmondson. Knee pain     having surgery on 2/12/13 left    Left hip pain 2013    Dr. Stacey Marinelli    Measles childhood    Menopause 11/2006    Mumps childhood    Ovarian cyst 02/21/2008    Left and Rt . Mrs. Berman Bronjuliano    Pneumonia 1981    x 2    PONV (postoperative nausea and vomiting)     Scarlet fever 1971    Uterine fibroid 02/21/08    Janett Pack, Midwife       Social History     Tobacco Use    Smoking status: Never     Passive exposure: Yes    Smokeless tobacco: Never    Tobacco comments:     grew up with smoker parents x 20 yrs   Vaping Use    Vaping Use: Never used   Substance Use Topics    Alcohol use: Yes     Comment: one beer every few months    Drug use: No       Outpatient Medications Marked as Taking for the 10/13/22 encounter (Office Visit) with Mayda Sharp, Corina WALLS PA-C   Medication Sig Dispense Refill    valsartan (DIOVAN) 80 mg tablet Take 1 Tablet by mouth daily. 90 Tablet 1    pravastatin (PRAVACHOL) 80 mg tablet Take 1 Tablet by mouth nightly. (THIS IS A NEW AND HIGHER DOSE) 90 Tablet 1    levothyroxine (SYNTHROID) 100 mcg tablet TAKE ONE TABLET BY MOUTH EVERY DAY (BEFORE BREAKFAST) 90 Tablet 0    sertraline (ZOLOFT) 50 mg tablet TAKE 1/2 TABLET DAILY 45 Tablet 0    fexofenadine (ALLEGRA) 180 mg tablet Take 180 mg by mouth daily. predniSONE (STERAPRED DS) 10 mg dose pack Take by mouth as directed until completion. 21 Tablet 0    Blood Pressure Monitor kit Check blood pressure daily.  Dx I10 1 Kit 0    montelukast (SINGULAIR) 10 mg tablet TAKE 1 TABLET BY MOUTH DAILY FOR ALLERGIES AND ASTHMA. 90 Tablet 3    ADVAIR DISKUS 500-50 mcg/dose diskus inhaler INHALE 1 PUFF TWICE A DAY 1 Inhaler 0    Nspyqocp-Qhyv-Ujneqy-Hyalur Ac 670-820-26-2 mg cap Take  by mouth. cetirizine (ZYRTEC) 10 mg tablet Take 10 mg by mouth daily as needed. cholecalciferol, VITAMIN D3, (VITAMIN D3) 5,000 unit tab tablet Take  by mouth daily. multivitamin (ONE A DAY) tablet Take 1 Tab by mouth daily. albuterol-ipratropium (DUO-NEB) 2.5 mg-0.5 mg/3 ml nebulizer solution 3 mL by Nebulization route every six (6) hours. 30 Vial 1    Nebulizer & Compressor machine 1 each by Does Not Apply route daily. 1 each 0    PROAIR HFA 90 mcg/actuation inhaler INHALE 1-2 PUFFS EVERY 4-6 HOURS AS NEEDED 1 Inhaler 5       Allergies   Allergen Reactions    Aspirin Other (comments)     Triggers asthma & causes chest tightness but takes 325 mg \"once in a while\"    Lipitor [Atorvastatin] Myalgia     Elevated CPK    Sulfa (Sulfonamide Antibiotics) Rash    Erythromycin Nausea and Vomiting    Penicillins Nausea and Vomiting     Many years ago       ROS:  ROS negative except as per HPI. PE:  Visit Vitals  /88 (BP 1 Location: Left arm, BP Patient Position: Sitting, BP Cuff Size: Adult)   Pulse 70   Temp 98.5 °F (36.9 °C) (Oral)   Resp 18   Ht 5' 4\" (1.626 m)   Wt 196 lb 12.8 oz (89.3 kg)   SpO2 97%   BMI 33.78 kg/m²     Gen: alert, oriented, no acute distress  Head: normocephalic, atraumatic  Eyes: pupils equal round reactive to light, sclera clear, conjunctiva clear  Neck: supple  Resp: no increase work of breathing, lungs clear to ausculation bilaterally, no wheezing, rales or rhonchi  CV: S1, S2 normal.  No murmurs, rubs, or gallops.      Neuro: grossly intact  Skin: no lesion or rash  Extremities: no cyanosis or edema  Psych:  alert, oriented to person, place, and time, normal mood, behavior, speech, dress, motor activity, and thought processes, no suicidal ideations      No results found for this visit on 10/13/22. Results for orders placed or performed in visit on 08/19/22   LIPID PANEL   Result Value Ref Range    Cholesterol, total 210 (H) <200 mg/dL    HDL Cholesterol 49 (L) > OR = 50 mg/dL    Triglyceride 243 (H) <150 mg/dL    LDL-CHOLESTEROL 122 (H) mg/dL (calc)    Cholesterol/HDL ratio 4.3 <5.0 (calc)    Non-HDL Cholesterol 161 (H) <130 mg/dL (calc)   METABOLIC PANEL, COMPREHENSIVE   Result Value Ref Range    Glucose 92 65 - 99 mg/dL    BUN 15 7 - 25 mg/dL    Creatinine 0.96 0.50 - 1.05 mg/dL    eGFR 65 > OR = 60 mL/min/1.73m2    BUN/Creatinine ratio NOT APPLICABLE 6 - 22 (calc)    Sodium 142 135 - 146 mmol/L    Potassium 4.4 3.5 - 5.3 mmol/L    Chloride 105 98 - 110 mmol/L    CO2 28 20 - 32 mmol/L    Calcium 9.6 8.6 - 10.4 mg/dL    Protein, total 6.8 6.1 - 8.1 g/dL    Albumin 4.6 3.6 - 5.1 g/dL    Globulin 2.2 1.9 - 3.7 g/dL (calc)    ALB/GLOBRATIO 2.1 1.0 - 2.5 (calc)    Bilirubin, total 0.6 0.2 - 1.2 mg/dL    Alkaline Phosphatase, total 74 37 - 153 U/L    AST (SGOT) 18 10 - 35 U/L    ALT (SGPT) 21 6 - 29 U/L       Assessment/Plan:      ICD-10-CM ICD-9-CM    1. Adjustment disorder with mixed anxiety and depressed mood  F43.23 309.28 busPIRone (BUSPAR) 5 mg tablet      2. Needs flu shot  Z23 V04.81 INFLUENZA, FLUAD, (AGE 65 Y+), IM, PF, 0.5 ML        1. Mixed anxiety depression  Currently on Zoloft 25 mg, and cannot tolerate higher dosing in the past  GHASSAN-7 score is 19 today, PHQ-9 score is 0  She has tried some of her partners BuSpar with benefit  We will add BuSpar to current Zoloft  New start BuSpar 5 mg every 8-12 hours    2. Flu shot today        Health Maintenance reviewed - updated.     Orders Placed This Encounter    Influenza, FLUAD, (age 72 y+), IM, PF, 0.5 mL    busPIRone (BUSPAR) 5 mg tablet     Sig: Take 1 tab po every 8-12 hours for anxiety     Dispense:  270 Tablet     Refill:  1       There are no discontinued medications. Current Outpatient Medications   Medication Sig Dispense Refill    busPIRone (BUSPAR) 5 mg tablet Take 1 tab po every 8-12 hours for anxiety 270 Tablet 1    valsartan (DIOVAN) 80 mg tablet Take 1 Tablet by mouth daily. 90 Tablet 1    pravastatin (PRAVACHOL) 80 mg tablet Take 1 Tablet by mouth nightly. (THIS IS A NEW AND HIGHER DOSE) 90 Tablet 1    sertraline (ZOLOFT) 50 mg tablet TAKE 1/2 TABLET DAILY 45 Tablet 0    fexofenadine (ALLEGRA) 180 mg tablet Take 180 mg by mouth daily. predniSONE (STERAPRED DS) 10 mg dose pack Take by mouth as directed until completion. 21 Tablet 0    Blood Pressure Monitor kit Check blood pressure daily. Dx I10 1 Kit 0    montelukast (SINGULAIR) 10 mg tablet TAKE 1 TABLET BY MOUTH DAILY FOR ALLERGIES AND ASTHMA. 90 Tablet 3    ADVAIR DISKUS 500-50 mcg/dose diskus inhaler INHALE 1 PUFF TWICE A DAY 1 Inhaler 0    Ipsfzven-Ojrw-Eaesxr-Hyalur Ac 517-899-82-2 mg cap Take  by mouth. cetirizine (ZYRTEC) 10 mg tablet Take 10 mg by mouth daily as needed. cholecalciferol, VITAMIN D3, (VITAMIN D3) 5,000 unit tab tablet Take  by mouth daily. multivitamin (ONE A DAY) tablet Take 1 Tab by mouth daily. albuterol-ipratropium (DUO-NEB) 2.5 mg-0.5 mg/3 ml nebulizer solution 3 mL by Nebulization route every six (6) hours. 30 Vial 1    Nebulizer & Compressor machine 1 each by Does Not Apply route daily. 1 each 0    PROAIR HFA 90 mcg/actuation inhaler INHALE 1-2 PUFFS EVERY 4-6 HOURS AS NEEDED 1 Inhaler 5    levothyroxine (SYNTHROID) 100 mcg tablet Take 1 Tablet by mouth Daily (before breakfast). 90 Tablet 1    EPINEPHrine (EPIPEN) 0.3 mg/0.3 mL injection       Xolair 150 mg/mL syrg          Recommended healthy diet low in carbohydrates, fats, sodium and cholesterol. Recommended regular cardiovascular exercise 3-6 times per week for 30-60 minutes daily. Verbal and written instructions (see AVS) provided.   Patient expresses understanding of diagnosis and treatment plan.         Follow-up as previously scheduled

## 2022-10-13 NOTE — PROGRESS NOTES
HIPAA verified by two patient identifiers. Ramon Bell is a 78 y.o. female    Chief Complaint   Patient presents with    Anxiety     Zoloft not working      patient states she started taking buspirone hcl 5mg on Saturday  ( its her  partners)  and has noticed a difference. Visit Vitals  BP (!) 149/92 (BP 1 Location: Left upper arm, BP Patient Position: Sitting, BP Cuff Size: Small adult)   Pulse 70   Temp 98.5 °F (36.9 °C) (Oral)   Resp 18   Ht 5' 4\" (1.626 m)   Wt 196 lb 12.8 oz (89.3 kg)   SpO2 97%   BMI 33.78 kg/m²       Pain Scale: 0 - No pain/10  Pain Location:       Health Maintenance Due   Topic Date Due    Bone Densitometry (Dexa) Screening  03/31/2021    COVID-19 Vaccine (3 - Booster for Pfizer series) 02/07/2022    Flu Vaccine (1) 08/01/2022         Coordination of Care Questionnaire:  :   1) Have you been to an emergency room, urgent care, or hospitalized since your last visit? If yes, where when, and reason for visit? no       2. Have seen or consulted any other health care provider since your last visit? If yes, where when, and reason for visit? NO      Patient is accompanied by self I have received verbal consent from Ramon Bell to discuss any/all medical information while they are present in the room.

## 2022-10-13 NOTE — PROGRESS NOTES
Buzznick Díazon a 78 y.o. female who is present for routine immunizations. Prior to vaccine administration After obtaining patient consent and per orders of Dr. Vidya George, injection of flu high dose in right deltoid given by Rocky Joy. Risks and adverse reactions were discussed. The patient was provided the VIS and they were given an opportunity to ask questions, all questions addressed. Order and injection/medication verified by second nurse/ma review by janeen díaz lpn. Patient tolerated procedure well. No reactions noted. Patient was advised to seek medical or call the office with any questions or concerns post vaccination. Patient verbalized understanding.  Rocky Joy

## 2022-10-14 RX ORDER — LEVOTHYROXINE SODIUM 100 UG/1
100 TABLET ORAL
Qty: 90 TABLET | Refills: 1 | Status: SHIPPED | OUTPATIENT
Start: 2022-10-14

## 2022-11-21 ENCOUNTER — OFFICE VISIT (OUTPATIENT)
Dept: INTERNAL MEDICINE CLINIC | Age: 67
End: 2022-11-21
Payer: COMMERCIAL

## 2022-11-21 VITALS
SYSTOLIC BLOOD PRESSURE: 133 MMHG | WEIGHT: 195.7 LBS | DIASTOLIC BLOOD PRESSURE: 84 MMHG | TEMPERATURE: 98.6 F | HEART RATE: 74 BPM | OXYGEN SATURATION: 93 % | BODY MASS INDEX: 33.41 KG/M2 | RESPIRATION RATE: 18 BRPM | HEIGHT: 64 IN

## 2022-11-21 DIAGNOSIS — E78.5 DYSLIPIDEMIA: ICD-10-CM

## 2022-11-21 DIAGNOSIS — E03.4 HYPOTHYROIDISM DUE TO ACQUIRED ATROPHY OF THYROID: ICD-10-CM

## 2022-11-21 DIAGNOSIS — F43.23 ADJUSTMENT DISORDER WITH MIXED ANXIETY AND DEPRESSED MOOD: ICD-10-CM

## 2022-11-21 DIAGNOSIS — I10 ESSENTIAL HYPERTENSION: Primary | ICD-10-CM

## 2022-11-21 DIAGNOSIS — Z12.31 ENCOUNTER FOR SCREENING MAMMOGRAM FOR MALIGNANT NEOPLASM OF BREAST: ICD-10-CM

## 2022-11-21 DIAGNOSIS — R73.02 IMPAIRED GLUCOSE TOLERANCE: ICD-10-CM

## 2022-11-21 PROCEDURE — 3078F DIAST BP <80 MM HG: CPT | Performed by: PHYSICIAN ASSISTANT

## 2022-11-21 PROCEDURE — 3074F SYST BP LT 130 MM HG: CPT | Performed by: PHYSICIAN ASSISTANT

## 2022-11-21 PROCEDURE — 99214 OFFICE O/P EST MOD 30 MIN: CPT | Performed by: PHYSICIAN ASSISTANT

## 2022-11-21 PROCEDURE — 1123F ACP DISCUSS/DSCN MKR DOCD: CPT | Performed by: PHYSICIAN ASSISTANT

## 2022-11-21 RX ORDER — SERTRALINE HYDROCHLORIDE 50 MG/1
TABLET, FILM COATED ORAL
Qty: 45 TABLET | Refills: 1 | Status: SHIPPED | OUTPATIENT
Start: 2022-11-21

## 2022-11-21 NOTE — PROGRESS NOTES
HPI:  78 y.o.  presents for follow up appointment. No hospital, ER or specialist visits since last primary care visit except as noted below. HTN  On valsartan 80 mg (started 3/31/22) with good tolerance  - home monitoring with her new monitor shows 130/70-80. No history of heart disease or stroke. No chest pain, no shortness of breath, no headaches, no lower extremity swelling. Hyperlipidemia  On pravastatin 80 mg (increased from 40 mg 8/2022)  Labs 3/24/22 showed total 207 (max 250), , HDL 46,  (max 158)  -of note, with dietary changes, she has lost 17-18 lbs from 2/2022 - 8/2022  -labs 8/19/2022 showed total 210, , HDL 49, -at which time pravastatin was increased from 40 mg up to 80 mg    Urticaria  Now on Xolair from pulmonary Dr Yun Martínez, feels much better and \"I'm not itching under my skin anymore\"    Anxiety with depression  On zoloft 25 mg  Plus buspar 5 mg added 10/13/22; she takes it qAM and QHS daily and some days may take a dose mid-day if she is having extra stress  -this regimen is working well for her    Hypothyroidism  On levo 100 mcg daily    Prediabetes  Noted A1C 5.8% in 3/2017, has been normal since then  Most recent A1C 5.2% on 10/2021  Diet controlled      Patient Active Problem List    Diagnosis    Chronic renal disease, stage III    Colon polyp     Dr. Kye Bergeron      Severe obesity ([facility] [address]    Bilateral shoulder pain     R partial RCT, OA GH and AC joint. Dr. Johnston      Hyperglycemia    History of total hip replacement    Granulomatous lung disease ([address]     stable. Dr. Kimber Barnhart.       Family history of osteoporosis    Family history of blood clots    Family history of dementia    Family history of stroke    Family history of diabetes mellitus (DM)    Family history of colon cancer     father      Family history of carcinoma in situ of anal canal     mom        Family history of osteoporosis in mother     and maternal grandmother Hypothyroidism due to acquired atrophy of thyroid    Major depression in complete remission ([facility] [address]     stable on Zoloft      Dyslipidemia    Vitamin D deficiency    Eczema      Keyshawn Castellani disorder     Dr. Henri Salguero. Carlos Dixon, counselor      Obesity, Class I, BMI 30-34.9    Fatty liver    Diverticulosis     Dr. Meme Hollins      Ovarian cyst     Left and Rt . Mrs. Waters      Uterine fibroid     [facility]           Past Medical History:   Diagnosis Date    Adjustment disorder 08/2012    Dr. Henri Salguero. Carlos Dixon, counselor    Allergic rhinitis 2016    Dr. Harriette Goldmann state, unspecified 01/25/07    Asthma 1980    Dr. Brooklynn Thorne, pulm. [facility] bertha inhalation. Bilateral shoulder pain 2019    R partial RCT, OA GH and AC joint. Dr. Roseline Iniguez    Chickenpox childhood    Colon polyp     Dr. Gopi Blas    Cutaneous wart 05/2015    Right thumb. [facility]. Dr. Willy Rome. Depression     Digital mucous cyst 05/2015    Left IP joint. Dr. Jt Huggins. Diverticulosis 10/2018    pandiverticulosis. Dr. Meme Hollins    DJD (degenerative joint disease) of hip 09/19/12    Dr. Robin Bullion    Eczema 2016    Dr. Victoria Schaumann liver 2006    Granulomatous lung disease ([facility] [address] 2017    stable. Dr. Rakesh Perez. Hearing aid worn 09/2014    Bilateral ears. Hematuria 04/1988    Hepatitis B immune March 2014    Prior vaccine--1625-1285. High cholesterol 1976    Hypothyroid 10/16/2006    Incidental pulmonary nodule 2019    RUL. Dr. Brooklynn Thorne. Knee pain     having surgery on 2/12/13 left    Left hip pain 2013    Dr. Roseline Iniguez    Measles childhood    Menopause 11/2006    Mumps childhood    Ovarian cyst 02/21/2008    Left and Rt . Mrs. Antonella Waters    Pneumonia 1981    x 2    PONV (postoperative nausea and vomiting)     Scarlet fever 1971    Uterine fibroid 02/21/08    [facility]       Social History     Tobacco Use Smoking status: Never     Passive exposure: Yes    Smokeless tobacco: Never    Tobacco comments:     grew up with smoker parents x 20 yrs   Vaping Use    Vaping Use: Never used   Substance Use Topics    Alcohol use: Yes     Comment: one beer every few months    Drug use: No       Outpatient Medications Marked as Taking for the 11/21/22 encounter (Office Visit) with Corina Rae PA-C   Medication Sig Dispense Refill    levothyroxine (SYNTHROID) 100 mcg tablet Take 1 Tablet by mouth Daily (before breakfast). 90 Tablet 1    EPINEPHrine (EPIPEN) 0.3 mg/0.3 mL injection       Xolair 150 mg/mL syrg       busPIRone (BUSPAR) 5 mg tablet Take 1 tab po every 8-12 hours for anxiety 270 Tablet 1    valsartan (DIOVAN) 80 mg tablet Take 1 Tablet by mouth daily. 90 Tablet 1    pravastatin (PRAVACHOL) 80 mg tablet Take 1 Tablet by mouth nightly. (THIS IS A NEW AND HIGHER DOSE) 90 Tablet 1    sertraline (ZOLOFT) 50 mg tablet TAKE 1/2 TABLET DAILY 45 Tablet 0    fexofenadine (ALLEGRA) 180 mg tablet Take 180 mg by mouth daily. Blood Pressure Monitor kit Check blood pressure daily. Dx I10 1 Kit 0    montelukast (SINGULAIR) 10 mg tablet TAKE 1 TABLET BY MOUTH DAILY FOR ALLERGIES AND ASTHMA. 90 Tablet 3    ADVAIR DISKUS 500-50 mcg/dose diskus inhaler INHALE 1 PUFF TWICE A DAY 1 Inhaler 0    Rwcjsuno-Zcha-Mwmymk-Hyalur Ac 973-330-12-2 mg cap Take  by mouth. cetirizine (ZYRTEC) 10 mg tablet Take 10 mg by mouth daily as needed. cholecalciferol, VITAMIN D3, (VITAMIN D3) 5,000 unit tab tablet Take  by mouth daily. multivitamin (ONE A DAY) tablet Take 1 Tab by mouth daily. albuterol-ipratropium (DUO-NEB) 2.5 mg-0.5 mg/3 ml nebulizer solution 3 mL by Nebulization route every six (6) hours. 30 Vial 1    Nebulizer & Compressor machine 1 each by Does Not Apply route daily.  1 each 0    PROAIR HFA 90 mcg/actuation inhaler INHALE 1-2 PUFFS EVERY 4-6 HOURS AS NEEDED 1 Inhaler 5       Allergies   Allergen Reactions    Aspirin Other (comments)     Triggers asthma & causes chest tightness but takes 325 mg \"once in a while\"    Lipitor [Atorvastatin] Myalgia     Elevated CPK    Sulfa (Sulfonamide Antibiotics) Rash    Erythromycin Nausea and Vomiting    Penicillins Nausea and Vomiting     Many years ago       ROS:  ROS negative except as per HPI. PE:  Visit Vitals  /84 (BP 1 Location: Left upper arm, BP Patient Position: Sitting, BP Cuff Size: Adult long)   Pulse 74   Temp 98.6 °F (37 °C) (Oral)   Resp 18   Ht 5' 4\" (1.626 m)   Wt 195 lb 11.2 oz (88.8 kg)   SpO2 93%   BMI 33.59 kg/m²     Gen: alert, oriented, no acute distress  Head: normocephalic, atraumatic  Eyes: pupils equal round reactive to light, sclera clear, conjunctiva clear  Neck: symmetric normal sized thyroid, no carotid bruits, no jugular vein distention  Resp: no increase work of breathing, lungs clear to ausculation bilaterally, no wheezing, rales or rhonchi  CV: S1, S2 normal.  No murmurs, rubs, or gallops. Abd: soft, not tender, not distended. Normal bowel sounds. Neuro: grossly intact  Skin: no lesion or rash  Extremities: no cyanosis or edema    No results found for this visit on 11/21/22.     Results for orders placed or performed in visit on 08/19/22   LIPID PANEL   Result Value Ref Range    Cholesterol, total 210 (H) <200 mg/dL    HDL Cholesterol 49 (L) > OR = 50 mg/dL    Triglyceride 243 (H) <150 mg/dL    LDL-CHOLESTEROL 122 (H) mg/dL (calc)    Cholesterol/HDL ratio 4.3 <5.0 (calc)    Non-HDL Cholesterol 161 (H) <130 mg/dL (calc)   METABOLIC PANEL, COMPREHENSIVE   Result Value Ref Range    Glucose 92 65 - 99 mg/dL    BUN 15 7 - 25 mg/dL    Creatinine 0.96 0.50 - 1.05 mg/dL    eGFR 65 > OR = 60 mL/min/1.73m2    BUN/Creatinine ratio NOT APPLICABLE 6 - 22 (calc)    Sodium 142 135 - 146 mmol/L    Potassium 4.4 3.5 - 5.3 mmol/L    Chloride 105 98 - 110 mmol/L    CO2 28 20 - 32 mmol/L    Calcium 9.6 8.6 - 10.4 mg/dL    Protein, total 6.8 6.1 - 8.1 g/dL Albumin 4.6 3.6 - 5.1 g/dL    Globulin 2.2 1.9 - 3.7 g/dL (calc)    ALB/GLOBRATIO 2.1 1.0 - 2.5 (calc)    Bilirubin, total 0.6 0.2 - 1.2 mg/dL    Alkaline Phosphatase, total 74 37 - 153 U/L    AST (SGOT) 18 10 - 35 U/L    ALT (SGPT) 21 6 - 29 U/L     Lab Results   Component Value Date/Time    Hemoglobin A1c 5.2 10/22/2021 08:28 AM     Lab Results   Component Value Date/Time    TSH 1.23 10/22/2021 08:28 AM    TSH, 3rd generation 0.05 (L) 04/13/2021 02:29 PM         Assessment/Plan:      ICD-10-CM ICD-9-CM    1. Essential hypertension  X16 277.0 METABOLIC PANEL, COMPREHENSIVE      CBC WITH AUTOMATED DIFF      2. Dyslipidemia  E78.5 272.4 LIPID PANEL      METABOLIC PANEL, COMPREHENSIVE      3. Adjustment disorder with mixed anxiety and depressed mood  F43.23 309.28 sertraline (ZOLOFT) 50 mg tablet    stable on Zoloft 25 mg daily      4. Encounter for screening mammogram for malignant neoplasm of breast  Z12.31 V76.12 SURESH 3D OMA W MAMMO BI SCREENING INCL CAD      5. Hypothyroidism due to acquired atrophy of thyroid  E03.4 244.8 TSH 3RD GENERATION     246.8       6. Impaired glucose tolerance  R73.02 790.22 HEMOGLOBIN A1C WITH EAG        1. Hypertension  Controlled on valsartan 80 mg, continue same    2. Hyperlipidemia  On pravastatin 80 mg (increased from 40 mg 8/2022)  We will check fasting lipids today and make further recommendations    3. Anxiety with depression  On Zoloft 25 mg plus BuSpar 5 mg added 10/13/2022  She is taking the BuSpar twice a day every day and will sometimes add a midday dose with good success in alleviating her stress  Continue current regimen    4. Routine breast cancer screening, mammogram ordered    5. Hypothyroidism  Has been controlled on levothyroxine 100 mcg daily, continue same and check TSH today    6.   History of impaired glucose tolerance  A1c 5.8% in 3/2017 but has been normal since then  Most recent A1c 5.2% on 10/2021  We will check A1c today  Reviewed diet and she has been doing well with dietary changes        Health Maintenance reviewed - updated. Orders Placed This Encounter    SURESH 3D OMA W MAMMO BI SCREENING INCL CAD     Standing Status:   Future     Standing Expiration Date:   12/21/2023    TSH 3RD GENERATION     Standing Status:   Future     Standing Expiration Date:   11/21/2023    LIPID PANEL     Standing Status:   Future     Standing Expiration Date:   01/64/4447    METABOLIC PANEL, COMPREHENSIVE     Standing Status:   Future     Standing Expiration Date:   11/21/2023    CBC WITH AUTOMATED DIFF     Standing Status:   Future     Standing Expiration Date:   11/21/2023    HEMOGLOBIN A1C WITH EAG     Standing Status:   Future     Standing Expiration Date:   11/21/2023    sertraline (ZOLOFT) 50 mg tablet     Sig: TAKE 1/2 TABLET DAILY     Dispense:  45 Tablet     Refill:  1       Medications Discontinued During This Encounter   Medication Reason    sertraline (ZOLOFT) 50 mg tablet REORDER       Current Outpatient Medications   Medication Sig Dispense Refill    sertraline (ZOLOFT) 50 mg tablet TAKE 1/2 TABLET DAILY 45 Tablet 1    levothyroxine (SYNTHROID) 100 mcg tablet Take 1 Tablet by mouth Daily (before breakfast). 90 Tablet 1    EPINEPHrine (EPIPEN) 0.3 mg/0.3 mL injection       Xolair 150 mg/mL syrg       busPIRone (BUSPAR) 5 mg tablet Take 1 tab po every 8-12 hours for anxiety 270 Tablet 1    valsartan (DIOVAN) 80 mg tablet Take 1 Tablet by mouth daily. 90 Tablet 1    pravastatin (PRAVACHOL) 80 mg tablet Take 1 Tablet by mouth nightly. (THIS IS A NEW AND HIGHER DOSE) 90 Tablet 1    fexofenadine (ALLEGRA) 180 mg tablet Take 180 mg by mouth daily. Blood Pressure Monitor kit Check blood pressure daily. Dx I10 1 Kit 0    montelukast (SINGULAIR) 10 mg tablet TAKE 1 TABLET BY MOUTH DAILY FOR ALLERGIES AND ASTHMA.  90 Tablet 3    ADVAIR DISKUS 500-50 mcg/dose diskus inhaler INHALE 1 PUFF TWICE A DAY 1 Inhaler 0    Grwdycuz-Ucgf-Vydehp-Hyalur Ac 819-957-92-2 mg cap Take  by mouth.      cetirizine (ZYRTEC) 10 mg tablet Take 10 mg by mouth daily as needed. cholecalciferol, VITAMIN D3, (VITAMIN D3) 5,000 unit tab tablet Take  by mouth daily. multivitamin (ONE A DAY) tablet Take 1 Tab by mouth daily. albuterol-ipratropium (DUO-NEB) 2.5 mg-0.5 mg/3 ml nebulizer solution 3 mL by Nebulization route every six (6) hours. 30 Vial 1    Nebulizer & Compressor machine 1 each by Does Not Apply route daily. 1 each 0    PROAIR HFA 90 mcg/actuation inhaler INHALE 1-2 PUFFS EVERY 4-6 HOURS AS NEEDED 1 Inhaler 5    predniSONE (STERAPRED DS) 10 mg dose pack Take by mouth as directed until completion. (Patient not taking: Reported on 11/21/2022) 21 Tablet 0       Recommended healthy diet low in carbohydrates, fats, sodium and cholesterol. Recommended regular cardiovascular exercise 3-6 times per week for 30-60 minutes daily. Verbal and written instructions (see AVS) provided. Patient expresses understanding of diagnosis and treatment plan.       Follow-up as previously scheduled    Future Appointments   Date Time Provider hSahla Farmer   12/28/2022  1:00 PM Nirav Singh MD [facility]

## 2022-11-21 NOTE — PROGRESS NOTES
HIPAA verified by two patient identifiers. Kennedy Cuadra is a 78 y.o. female    Chief Complaint   Patient presents with    Cholesterol Problem     3 months       Visit Vitals  /84 (BP 1 Location: Left upper arm, BP Patient Position: Sitting, BP Cuff Size: Adult long)   Pulse 74   Temp 98.6 °F (37 °C) (Oral)   Resp 18   Ht 5' 4\" (1.626 m)   Wt 195 lb 11.2 oz (88.8 kg)   SpO2 93%   BMI 33.59 kg/m²       Pain Scale: 0 - No pain/10  Pain Location:       Health Maintenance Due   Topic Date Due    Shingrix Vaccine Age 49> (1 of 2) Never done    Bone Densitometry (Dexa) Screening  03/31/2021    COVID-19 Vaccine (3 - Booster for Pfizer series) 11/02/2021         Coordination of Care Questionnaire:  :   1) Have you been to an emergency room, urgent care, or hospitalized since your last visit? If yes, where when, and reason for visit? no       2. Have seen or consulted any other health care provider since your last visit? If yes, where when, and reason for visit? NO      Patient is accompanied by self I have received verbal consent from Kennedy Cuadra to discuss any/all medical information while they are present in the room.

## 2022-11-22 LAB
ALB/GLOBRATIO, 58C: 2 (CALC) (ref 1–2.5)
ALBUMIN SERPL-MCNC: 4.3 G/DL (ref 3.6–5.1)
ALKALINE PHOSPHATASE, TOTAL, 25002000: 62 U/L (ref 37–153)
ALT SERPL-CCNC: 21 U/L (ref 6–29)
AST SERPL W P-5'-P-CCNC: 20 U/L (ref 10–35)
BASOPHILS # BLD: 11 CELLS/UL (ref 0–200)
BASOPHILS NFR BLD: 0.2 %
BILIRUB SERPL-MCNC: 0.7 MG/DL (ref 0.2–1.2)
BUN SERPL-MCNC: 17 MG/DL (ref 7–25)
BUN/CREATININE RATIO,BUCR: NORMAL (CALC) (ref 6–22)
CALCIUM SERPL-MCNC: 9.4 MG/DL (ref 8.6–10.4)
CHLORIDE SERPL-SCNC: 106 MMOL/L (ref 98–110)
CHOL/HDL RATIO,CHHDX: 4.3 (CALC)
CHOLEST SERPL-MCNC: 178 MG/DL
CO2 SERPL-SCNC: 29 MMOL/L (ref 20–32)
CREAT SERPL-MCNC: 0.83 MG/DL (ref 0.5–1.05)
EAG (MG/DL),9916804: 103 MG/DL
EAG (MMOL/L),9916805: 5.7 MMOL/L
EGFR: 78 ML/MIN/1.73M2
EOSINOPHIL # BLD: 140 CELLS/UL (ref 15–500)
EOSINOPHIL NFR BLD: 2.6 %
ERYTHROCYTE [DISTWIDTH] IN BLOOD BY AUTOMATED COUNT: 12.8 % (ref 11–15)
GLOBULIN,GLOB: 2.1 G/DL (CALC) (ref 1.9–3.7)
GLUCOSE SERPL-MCNC: 79 MG/DL (ref 65–99)
HBA1C MFR BLD HPLC: 5.2 % OF TOTAL HGB
HCT VFR BLD AUTO: 42.4 % (ref 35–45)
HDLC SERPL-MCNC: 41 MG/DL
HGB BLD-MCNC: 14 G/DL (ref 11.7–15.5)
LDL-CHOLESTEROL: 107 MG/DL (CALC)
LYMPHOCYTES # BLD: 1312 CELLS/UL (ref 850–3900)
LYMPHOCYTES NFR BLD: 24.3 %
MCH RBC QN AUTO: 28.5 PG (ref 27–33)
MCHC RBC AUTO-ENTMCNC: 33 G/DL (ref 32–36)
MCV RBC AUTO: 86.2 FL (ref 80–100)
MONOCYTES # BLD: 383 CELLS/UL (ref 200–950)
MONOCYTES NFR BLD: 7.1 %
NEUTROPHILS # BLD AUTO: 3553 CELLS/UL (ref 1500–7800)
NEUTROPHILS # BLD: 65.8 %
NON-HDL CHOLESTEROL, 011976: 137 MG/DL (CALC)
PLATELET # BLD AUTO: 240 THOUSAND/UL (ref 140–400)
PMV BLD AUTO: 10.3 FL (ref 7.5–12.5)
POTASSIUM SERPL-SCNC: 4.4 MMOL/L (ref 3.5–5.3)
PROT SERPL-MCNC: 6.4 G/DL (ref 6.1–8.1)
RBC # BLD AUTO: 4.92 MILLION/UL (ref 3.8–5.1)
SODIUM SERPL-SCNC: 142 MMOL/L (ref 135–146)
TRIGL SERPL-MCNC: 181 MG/DL (ref ?–150)
TSH SERPL DL<=0.005 MIU/L-ACNC: 0.74 MIU/L (ref 0.4–4.5)
WBC # BLD AUTO: 5.4 THOUSAND/UL (ref 3.8–10.8)

## 2022-12-21 PROBLEM — N18.30 CHRONIC RENAL DISEASE, STAGE III (HCC): Status: RESOLVED | Noted: 2022-05-12 | Resolved: 2022-12-21

## 2022-12-21 NOTE — PROGRESS NOTES
ICD-10-CM ICD-9-CM    1. Routine adult health maintenance  Z00.00 V70.0       2. Essential hypertension  I10 401.9       3. Dyslipidemia  E78.5 272.4       4. Hypothyroidism due to acquired atrophy of thyroid  E03.4 244.8      246.8       5. Adjustment disorder with mixed anxiety and depressed mood  F43.23 309.28                Subjective:     Chief Complaint   Patient presents with    [address]       Shae Horn is a 78 y.o. F.  She is a previous patient of Corina Perez. I reviewed and updated the medical record. This patient was seen by her previous primary care provider most recently in late November. She is noted to have a history of hypothyroidism, hypercholesterolemia, and hypertension. At the time, she was generally feeling fine. She was using valsartan 80 mg with good tolerance, and overall good control of her blood pressure. She was felt to be tolerating pravastatin 80 mg daily for her hypercholesterolemia. She was also noted to be using Xolair as directed by pulmonary medicine, for a history of chronic urticaria. Zoloft 25 mg daily was felt to be appropriate for her history of anxiety with depression, in addition to buspirone. Overall, this regimen was felt to be working well for her. No significant changes were made to her medication regimen other than increasing her sertraline to 50 mg daily from 25 mg daily. Routine laboratory studies were ordered and she was referred for routine screening mammography. Today, the patient comes in for establishment and routine follow-up on her chronic medical concerns. He reports feeling fine overall today and has no significant acute somatic complaints. Since her last visit, she did not increase the sertraline to 50 mg daily, but has instead continued on sertraline 25 mg daily in addition to buspirone.   She feels that this current regimen is overall effective for her and she denies any worsening anxiety or depression, and denies any suicidal or homicidal ideation. She denies having had any side effects associated with these medications. She continues on Xolair in addition to numerous other medications including numerous antihistamines as prescribed to her by her pulmonary specialist for her history of urticaria. She continues to have daily itching, but overall feels that the symptoms are overall stable. She denies any excess sedation, or dry mouth, or any unexplained infections associated with her current medication regimen. She has a history of hypercholesterolemia. Recently, her previous primary care provider had rechecked her lipid panel, with her LDL being relatively close to target less than 100 mg/dL while on a maximum dose of pravastatin 80 mg daily. She denies having had any muscle aches or pains or GI symptoms with this with this medication. She continues on Diovan for her hypertension and denies having had any lightheadedness or dizziness associate with this medication. No focal neurological symptoms. No recent change in exercise tolerance. No recent chest pain, shortness breath, or orthopnea or paroxysmal nocturnal dyspnea. No recent increased use of her rescue inhaler. Review of systems otherwise negative. Routine Healthcare Maintenance issues are reviewed and discussed with the patient as noted below. Orders to update gaps in healthcare maintenance were placed as noted below in the Assessment and Plan, where applicable. Past Medical History:  Past Medical History:   Diagnosis Date    Adjustment disorder 08/2012    Dr. Guerda Lau. Aaron Saucedo, counselor    Allergic rhinitis 2016    Dr. Mere Fleming, pulm. [address] [facility] [address] [facility]. Bilateral shoulder pain 2019    R partial RCT, OA GH and AC joint. Dr. Johnston    Depression     Diverticulosis 10/2018    pandiverticulosis.   Dr. Tasha Li    DJD (degenerative joint disease) of hip 09/19/2012    Dr. Chad Meadows Hilda    Eczema 2016    Dr. George Moore    Granulomatous lung disease ([facility] [address] 2017    stable. Dr. Onesimo Keith. Hearing aid worn 09/2014    Bilateral ears. History of bacterial pneumonia 1981    x 2    History of chickenpox childhood    History of colon polyps     Dr. Devante Deluca    History of common wart 05/2015    Cutaneous wart - Right thumb. [place]. Dr. Armond Rader. History of echocardiogram 12/2020    TTE 12/20 - LV: Estimated LVEF is >70%. Biplane method used to measure ejection fraction. Normal cavity size and systolic function (ejection fraction normal). Mild concentric hypertrophy. Wall motion: normal. Mild (grade 1) left ventricular diastolic dysfunction. Mild MR. History of hematuria 04/1988    History of mumps childhood    History of ovarian cyst 02/21/2008    Left and Rt . Mrs. Dominique Moraes    History of receipt of hepatitis B immune globulin 03/2014    Prior vaccine--4592-0550. History of scarlet fever 1971    History of sebaceous cyst 05/2015    Digital mucous cyst Left IP joint. Dr. Chance Siddiqui. Hypercholesterolemia 1976    Hypothyroidism (acquired) 10/16/2006    Incidental pulmonary nodule 2019    RUL. Dr. Eran Jama. Measles childhood    NAFL (nonalcoholic fatty liver) 9688    Osteoarthritis, multiple sites     having surgery on 2/12/13 left    Osteopenia     PONV (postoperative nausea and vomiting)     Postmenopausal 11/2006    Uterine fibroid 02/21/2008    Dominique Moraes, Midwife       Past Surgical Histor:  Past Surgical History:   Procedure Laterality Date    COLONOSCOPY N/A 10/5/2018    Dr. Radha Palma MD at [address] [facility]. due q 5 yrs. HX COLONOSCOPY  04/07/08    Dr. Shawna Joseph. due q 5 yrs. HX HIP REPLACEMENT Left 02/12/13    Left.  with Anterior approach. Dr. Any España. HX ORTHOPAEDIC Left 05/18/15    Thumb. IP joint MUCOUS CYST REMOVED. Dr. Chance Siddiqui. HX ORTHOPAEDIC Right 05/18/2015    Thumb. Dr. Domonique Champagne.     HX SHOULDER REPLACEMENT Right 04/2021    HX SHOULDER REPLACEMENT Right 04/2021    HX TONSILLECTOMY  1962       Allergies: Allergies   Allergen Reactions    Aspirin Other (comments)     Triggers asthma & causes chest tightness but takes 325 mg \"once in a while\"    Lipitor [Atorvastatin] Myalgia     Elevated CPK    Sulfa (Sulfonamide Antibiotics) Rash    Erythromycin Nausea and Vomiting    Penicillins Nausea and Vomiting     Many years ago       Medications:  Current Outpatient Medications   Medication Sig Dispense Refill    sertraline (ZOLOFT) 50 mg tablet TAKE 1/2 TABLET DAILY 45 Tablet 1    levothyroxine (SYNTHROID) 100 mcg tablet Take 1 Tablet by mouth Daily (before breakfast). 90 Tablet 1    EPINEPHrine (EPIPEN) 0.3 mg/0.3 mL injection       Xolair 150 mg/mL syrg       busPIRone (BUSPAR) 5 mg tablet Take 1 tab po every 8-12 hours for anxiety 270 Tablet 1    valsartan (DIOVAN) 80 mg tablet Take 1 Tablet by mouth daily. 90 Tablet 1    pravastatin (PRAVACHOL) 80 mg tablet Take 1 Tablet by mouth nightly. (THIS IS A NEW AND HIGHER DOSE) 90 Tablet 1    fexofenadine (ALLEGRA) 180 mg tablet Take 180 mg by mouth daily. Blood Pressure Monitor kit Check blood pressure daily. Dx I10 1 Kit 0    montelukast (SINGULAIR) 10 mg tablet TAKE 1 TABLET BY MOUTH DAILY FOR ALLERGIES AND ASTHMA. 90 Tablet 3    ADVAIR DISKUS 500-50 mcg/dose diskus inhaler INHALE 1 PUFF TWICE A DAY 1 Inhaler 0    Mxvgicrr-Etxw-Gwyxqs-Hyalur Ac 805-019-17-2 mg cap Take  by mouth. cetirizine (ZYRTEC) 10 mg tablet Take 10 mg by mouth daily as needed. cholecalciferol, VITAMIN D3, (VITAMIN D3) 5,000 unit tab tablet Take  by mouth daily. multivitamin (ONE A DAY) tablet Take 1 Tab by mouth daily. albuterol-ipratropium (DUO-NEB) 2.5 mg-0.5 mg/3 ml nebulizer solution 3 mL by Nebulization route every six (6) hours. 30 Vial 1    Nebulizer & Compressor machine 1 each by Does Not Apply route daily.  1 each 0    PROAIR HFA 90 mcg/actuation inhaler INHALE 1-2 PUFFS EVERY 4-6 HOURS AS NEEDED 1 Inhaler 5    predniSONE (STERAPRED DS) 10 mg dose pack Take by mouth as directed until completion.  (Patient not taking: No sig reported) 21 Tablet 0       Social History:  Social History     Socioeconomic History    Marital status:    Tobacco Use    Smoking status: Never     Passive exposure: Yes    Smokeless tobacco: Never    Tobacco comments:     grew up with smoker parents x 20 yrs   Vaping Use    Vaping Use: Never used   Substance and Sexual Activity    Alcohol use: Yes     Comment: one beer every few months    Drug use: No    Sexual activity: Never     Partners: Female     Birth control/protection: Abstinence     Social Determinants of Health     Financial Resource Strain: Low Risk     Difficulty of Paying Living Expenses: Not hard at all   Food Insecurity: No Food Insecurity    Worried About Running Out of Food in the Last Year: Never true    Ran Out of Food in the Last Year: Never true       Family History:  Family History   Problem Relation Age of Onset    Cancer Mother         Squamous Cell Anal CA with mets (including skin)    High Cholesterol Mother     Osteoporosis Mother     Colon Cancer Father     Hypertension Father     Cancer Maternal Grandmother         cervical    Stroke Maternal Grandmother         TIAs    Other Maternal Grandmother         multiple blood clots    Osteoporosis Maternal Grandmother     OSTEOARTHRITIS Maternal Grandmother     Dementia Maternal Grandmother         Alzheimers    Diabetes Paternal Grandfather        Immunizations:  Immunization History   Administered Date(s) Administered    (RETIRED) Pneumococcal Vaccine (Unspecified Type) 12/01/1998    COVID-19, PFIZER PURPLE top, DILUTE for use, (age 15 y+), IM, 30mcg/0.3mL 08/16/2021, 09/07/2021    Hep A Vaccine (Adult) 03/05/2014    Hepatitis B Vaccine 10/14/2010, 11/14/2010, 04/29/2011    Influenza Vaccine 10/25/2016, 08/16/2021    Influenza Vaccine Split 11/22/2011, 10/09/2012 Influenza Vaccine Whole 10/14/2010    Influenza, AFLURIA (age 11-32 mo), IM, MDV, 0.25 mL, Fluzone (age 10 mo+), AFLURIA (age 1 y+), IM, MDV, 0.5mL 01/15/2016    Influenza, FLUAD, (age 72 y+), Adjuvanted 10/13/2022    Influenza, FLUARIX, FLULAVAL, FLUZONE (age 10 mo+) AND AFLURIA, (age 1 y+), PF, 0.5mL 01/07/2020    Pneumococcal Polysaccharide (PPSV-23) 10/14/2021    TD Vaccine 10/01/1999    TDAP Vaccine 10/18/2010    Tdap 10/22/2021        Healthcare Maintenance:  Health Maintenance   Topic Date Due    Shingles Vaccine (1 of 2) Never done    Bone Densitometry (Dexa) Screening  03/31/2021    COVID-19 Vaccine (3 - Booster for Pfizer series) 11/02/2021    Breast Cancer Screen Mammogram  12/28/2022    Colorectal Cancer Screening Combo  10/05/2023    Depression Monitoring  11/21/2023    Lipid Screen  11/21/2023    DTaP/Tdap/Td series (3 - Td or Tdap) 10/22/2031    Hepatitis C Screening  Completed    Flu Vaccine  Completed    Pneumococcal 65+ years  Completed        Review of Systems:  ROS:  Review of Systems   Constitutional: Negative. HENT: Negative. Eyes: Negative. Respiratory: Negative. Cardiovascular: Negative. Gastrointestinal: Negative. Genitourinary: Negative. Musculoskeletal: Negative. Skin:  Positive for itching. Neurological: Negative. Endo/Heme/Allergies: Negative. Psychiatric/Behavioral: Negative. ROS otherwise negative      Objective:     Vital Signs:  Visit Vitals  /82 (BP 1 Location: Left upper arm, BP Patient Position: Sitting, BP Cuff Size: Large adult)   Pulse 62   Resp 18   Ht 5' 4\" (1.626 m)   Wt 195 lb 9.6 oz (88.7 kg)   SpO2 98%   BMI 33.57 kg/m²       BMI:  Body mass index is 33.57 kg/m². Physical Examination:  Physical Exam  Constitutional:       Appearance: Normal appearance. She is obese. HENT:      Head: Normocephalic and atraumatic.       Right Ear: External ear normal.      Left Ear: External ear normal.      Nose: Nose normal.      Mouth/Throat: Mouth: Mucous membranes are moist.      Pharynx: Oropharynx is clear. No oropharyngeal exudate or posterior oropharyngeal erythema. Cardiovascular:      Rate and Rhythm: Normal rate and regular rhythm. Pulses: Normal pulses. Heart sounds: Normal heart sounds. No murmur heard. No friction rub. No gallop. Pulmonary:      Effort: Pulmonary effort is normal. No respiratory distress. Breath sounds: Normal breath sounds. No wheezing, rhonchi or rales. Abdominal:      General: Abdomen is flat. Bowel sounds are normal. There is no distension. Palpations: Abdomen is soft. Tenderness: There is no abdominal tenderness. There is no guarding. Musculoskeletal:         General: No swelling, tenderness or deformity. Normal range of motion. Cervical back: Normal range of motion and neck supple. No rigidity or tenderness. Skin:     General: Skin is warm and dry. Findings: No erythema, lesion or rash. Neurological:      General: No focal deficit present. Mental Status: She is alert and oriented to person, place, and time. Mental status is at baseline. Sensory: No sensory deficit. Motor: No weakness. Gait: Gait normal.      Deep Tendon Reflexes: Reflexes normal.   Psychiatric:         Mood and Affect: Mood normal.         Behavior: Behavior normal.         Judgment: Judgment normal.        Physical exam otherwise negative    Diagnostic Testing:    Laboratory Studies:  Office Visit on 11/21/2022   Component Date Value Ref Range Status    Hemoglobin A1c 11/21/2022 5.2  <5.7 % of total Hgb Final    Comment: For the purpose of screening for the presence of  diabetes:     <5.7%       Consistent with the absence of diabetes  5.7-6.4%    Consistent with increased risk for diabetes              (prediabetes)  > or =6.5%  Consistent with diabetes     This assay result is consistent with a decreased risk  of diabetes.      Currently, no consensus exists regarding use of  hemoglobin A1c for diagnosis of diabetes in children. According to American Diabetes Association (ADA)  guidelines, hemoglobin A1c <7.0% represents optimal  control in non-pregnant diabetic patients. Different  metrics may apply to specific patient populations. Standards of Medical Care in Diabetes(ADA). eAG (mg/dL) 11/21/2022 103  mg/dL Final    eAG (mmol/L) 11/21/2022 5.7  mmol/L Final    WBC 11/21/2022 5.4  3.8 - 10.8 Thousand/uL Final    RBC 11/21/2022 4.92  3.80 - 5.10 Million/uL Final    HGB 11/21/2022 14.0  11.7 - 15.5 g/dL Final    HCT 11/21/2022 42.4  35.0 - 45.0 % Final    MCV 11/21/2022 86.2  80.0 - 100.0 fL Final    MCH 11/21/2022 28.5  27.0 - 33.0 pg Final    MCHC 11/21/2022 33.0  32.0 - 36.0 g/dL Final    RDW 11/21/2022 12.8  11.0 - 15.0 % Final    PLATELET 26/62/6536 149  140 - 400 Thousand/uL Final    MEAN PLATELET VOLUME 76/87/7364 10.3  7.5 - 12.5 fL Final    ABS. NEUTROPHILS 11/21/2022 3,553  1,500 - 7,800 cells/uL Final    ABS. LYMPHOCYTES 11/21/2022 1,312  850 - 3,900 cells/uL Final    ABS. MONOCYTES 11/21/2022 383  200 - 950 cells/uL Final    ABS. EOSINOPHILS 11/21/2022 140  15 - 500 cells/uL Final    ABS. BASOPHILS 11/21/2022 11  0 - 200 cells/uL Final    Neutrophils 11/21/2022 65.8  % Final    LYMPHOCYTES 11/21/2022 24.3  % Final    MONOCYTES 11/21/2022 7.1  % Final    EOSINOPHILS 11/21/2022 2.6  % Final    BASOPHILS 11/21/2022 0.2  % Final    Glucose 11/21/2022 79  65 - 99 mg/dL Final    Comment:               Fasting reference interval         BUN 11/21/2022 17  7 - 25 mg/dL Final    Creatinine 11/21/2022 0.83  0.50 - 1.05 mg/dL Final    eGFR 11/21/2022 78  > OR = 60 mL/min/1.73m2 Final    Comment: The eGFR is based on the CKD-EPI 2021 equation. To calculate   the new eGFR from a previous Creatinine or Cystatin C  result, go to Sd.at. org/professionals/  kdoqi/gfr%5Fcalculator      BUN/Creatinine ratio 98/87/2559 NOT APPLICABLE  6 - 22 (calc) Final    Sodium 11/21/2022 142  135 - 146 mmol/L Final    Potassium 11/21/2022 4.4  3.5 - 5.3 mmol/L Final    Chloride 11/21/2022 106  98 - 110 mmol/L Final    CO2 11/21/2022 29  20 - 32 mmol/L Final    Calcium 11/21/2022 9.4  8.6 - 10.4 mg/dL Final    Protein, total 11/21/2022 6.4  6.1 - 8.1 g/dL Final    Albumin 11/21/2022 4.3  3.6 - 5.1 g/dL Final    Globulin 11/21/2022 2.1  1.9 - 3.7 g/dL (calc) Final    ALB/GLOBRATIO 11/21/2022 2.0  1.0 - 2.5 (calc) Final    Bilirubin, total 11/21/2022 0.7  0.2 - 1.2 mg/dL Final    Alkaline Phosphatase, total 11/21/2022 62  37 - 153 U/L Final    AST (SGOT) 11/21/2022 20  10 - 35 U/L Final    ALT (SGPT) 11/21/2022 21  6 - 29 U/L Final    Cholesterol, total 11/21/2022 178  <200 mg/dL Final    HDL Cholesterol 11/21/2022 41 (A)  > OR = 50 mg/dL Final    Triglyceride 11/21/2022 181 (A)  <150 mg/dL Final    LDL-CHOLESTEROL 11/21/2022 107 (A)  mg/dL (calc) Final    Comment: Reference range: <100     Desirable range <100 mg/dL for primary prevention;    <70 mg/dL for patients with CHD or diabetic patients   with > or = 2 CHD risk factors. LDL-C is now calculated using the Srinivas-Dai   calculation, which is a validated novel method providing   better accuracy than the Friedewald equation in the   estimation of LDL-C. Adry Roy al. [employer]. 3257;224(69): 2927-7824   (http://education. [employer]. com/faq/IDM296)      Cholesterol/HDL ratio 11/21/2022 4.3  <5.0 (calc) Final    Non-HDL Cholesterol 11/21/2022 137 (A)  <130 mg/dL (calc) Final    Comment: For patients with diabetes plus 1 major ASCVD risk   factor, treating to a non-HDL-C goal of <100 mg/dL   (LDL-C of <70 mg/dL) is considered a therapeutic   option.       TSH 11/21/2022 0.74  0.40 - 4.50 mIU/L Final   Lab Only on 08/19/2022   Component Date Value Ref Range Status    Cholesterol, total 08/19/2022 210 (A)  <200 mg/dL Final    HDL Cholesterol 08/19/2022 49 (A)  > OR = 50 mg/dL Final    Triglyceride 08/19/2022 243 (A)  <150 mg/dL Final    Comment:    If a non-fasting specimen was collected, consider  repeat triglyceride testing on a fasting specimen  if clinically indicated. Matthieu Arauz et al. J. of Clin. Lipidol. 5251;9:485-855. LDL-CHOLESTEROL 08/19/2022 122 (A)  mg/dL (calc) Final    Comment: Reference range: <100     Desirable range <100 mg/dL for primary prevention;    <70 mg/dL for patients with CHD or diabetic patients   with > or = 2 CHD risk factors. LDL-C is now calculated using the Srinivas-Dai   calculation, which is a validated novel method providing   better accuracy than the Friedewald equation in the   estimation of LDL-C. Chely Loera et al. [facility]. [facility];813(97): 7515-1840   (http://education. [employer]/faq/DFJ587)      Cholesterol/HDL ratio 08/19/2022 4.3  <5.0 (calc) Final    Non-HDL Cholesterol 08/19/2022 161 (A)  <130 mg/dL (calc) Final    Comment: For patients with diabetes plus 1 major ASCVD risk   factor, treating to a non-HDL-C goal of <100 mg/dL   (LDL-C of <70 mg/dL) is considered a therapeutic   option. Glucose 08/19/2022 92  65 - 99 mg/dL Final    Comment:               Fasting reference interval         BUN 08/19/2022 15  7 - 25 mg/dL Final    Creatinine 08/19/2022 0.96  0.50 - 1.05 mg/dL Final    eGFR 08/19/2022 65  > OR = 60 mL/min/1.73m2 Final    Comment: The eGFR is based on the CKD-EPI 2021 equation. To calculate   the new eGFR from a previous Creatinine or Cystatin C  result, go to [email or web address]. org/professionals/  kdoqi/gfr%5Fcalculator      BUN/Creatinine ratio 97/16/9097 NOT APPLICABLE  6 - 22 (calc) Final    Sodium 08/19/2022 142  135 - 146 mmol/L Final    Potassium 08/19/2022 4.4  3.5 - 5.3 mmol/L Final    Chloride 08/19/2022 105  98 - 110 mmol/L Final    CO2 08/19/2022 28  20 - 32 mmol/L Final    Calcium 08/19/2022 9.6  8.6 - 10.4 mg/dL Final    Protein, total 08/19/2022 6.8  6.1 - 8.1 g/dL Final    Albumin 08/19/2022 4.6  3.6 - 5.1 g/dL Final    Globulin 08/19/2022 2.2 1.9 - 3.7 g/dL (calc) Final    ALB/GLOBRATIO 08/19/2022 2.1  1.0 - 2.5 (calc) Final    Bilirubin, total 08/19/2022 0.6  0.2 - 1.2 mg/dL Final    Alkaline Phosphatase, total 08/19/2022 74  37 - 153 U/L Final    AST (SGOT) 08/19/2022 18  10 - 35 U/L Final    ALT (SGPT) 08/19/2022 21  6 - 29 U/L Final         Radiographic Studies:  XR Results (most recent):  Results from Hospital Encounter encounter on 04/25/22    XR CHEST PORT    Narrative  Indication: Shortness of breath    Comparison: 3/2/2020    Portable exam of the chest obtained at 12:30 demonstrates normal heart size. There is no acute process in the lung fields. Dextroconvex scoliosis of the  thoracic spine is again demonstrated. The patient is status post right shoulder  replacement. Impression  No acute process. [facility] Results (most recent):  Results from [place] encounter on 12/28/20    [facility] 3D OMA W MAMMO BI SCREENING INCL CAD    Narrative  STUDY: Bilateral digital screening mammogram with 3-D tomosynthesis    INDICATION:  Screening. COMPARISON: 8/7/2019 through 11/22/2013. BREAST COMPOSITION: There are scattered areas of fibroglandular density. FINDINGS: Bilateral digital screening mammography was performed and is  interpreted in conjunction with a computer assisted detection (CAD) system. Additionally, tomosynthesis of both breasts in the CC and MLO projections was  performed. No suspicious masses or calcifications are identified. There has been  no significant change. Impression  IMPRESSION:  BI-RADS 1: Negative. No mammographic evidence of malignancy. RECOMMENDATIONS:  Next screening mammogram is recommended in one year. The patient will be notified of these results. CT Results (most recent):  No results found for this or any previous visit.      DEXA Results (most recent):  Results from Hospital Encounter encounter on 02/08/16    DEXA BONE DENSITY STUDY AXIAL    Narrative  **Final Report**      ICD Codes / Adm.Diagnosis: V76.10  Z12.39 / Symptomatic menopausal or fema  Family history of osteoporos  Examination:  MM DEXA AXIAL SKELETON  - [id number] - Feb 8 2016  7:59AM  Accession No:  [id number]  Reason:  hypothyroid, fam his osteoporosis      REPORT:  Bone Mineral Density    Indication:  hypothyroid, family history osteoporosis  Age: 62  Sex: Female. Menopause status:         postmenopausal.  Hormone replacement therapy: No    Risk factors for fall:   None. Risk factors for osteoporosis:  none confirmed    Current medication for osteoporosis: Vitamin D. Comparison: None. Technique: Imaging was performed on the                  [address] [place] [address] meta-analysis fracture risk calculator (FRAX) analysis  was performed for 10 year fracture risk probability assessment    Excluded sites: L2 and L3 for spondylitic change and left hip for prior  surgery    Findings:    Femoral Neck:  Right  Bone mineral density (gm/cm2):? 0.887  % of peak bone mass: 85  % for age matched controls:? 92  T-score: -1.1  Z-score: -0.6    Total Hip: Right  Bone mineral density (gm/cm2):  0.990  % of peak bone mass:   98  % for age matched controls:  100  T-score:   -0.1  Z-score:  0    Lumbar Spine:  L1 plus L4  Bone mineral density (gm/cm2):  1.236  % of peak bone mass:  104  % for age matched controls:  105  T-score:  0.4  Z-score:  0.5    Impression  : This patient is osteopenic using the World Health Organization criteria  10 year probability of major osteoporotic fracture:  6.5%  10 year probability of hip fracture:  0.4%    Recommendations:  Therapy recommendations need to be tailored to each individual patient.   Using the [address]) FRAX absolute fracture algorithm,  the [address] [place] [address] recommends beginning pharmacological  therapy in postmenopausal women and men over the age of 48 with a 8 year  probability of a hip fracture of >3% OR with the 10 year probability of a  major osteoporotic fracture of >20%. Please reconsider testing based on risk factors. Currently, Medicare will  only reimburse for a central DXA examination every two years, unless the  patient is on chronic glucocorticoid therapy. Note: Please note that reliable, valid comparisons cannot be made between  studies which have been performed on machines from different manufacturers. If clinically warranted, a follow up study performed at this site, on the  same unit, would allow the most sensitive assessment of change in bone  mineral density. Signing/Reading Doctor: Maria Isabel Tubbs ([id number])  Approved: Maria Isabel Tubbs ([id number])  Feb 10 2016  3:42PM     MRI Results (most recent):  No results found for this or any previous visit. Assessment/Plan:       ICD-10-CM ICD-9-CM    1. Routine adult health maintenance  Z00.00 V70.0       2. Essential hypertension  I10 401.9       3. Dyslipidemia  E78.5 272.4       4. Hypothyroidism due to acquired atrophy of thyroid  E03.4 244.8      246.8       5. Adjustment disorder with mixed anxiety and depressed mood  F43.23 309.28                Healthcare Maintenance:  - Preventive measures are reviewed as per above  - Up to date on routine interventions except as noted above  - Orders placed to update gaps as noted  - Notes: Repeat bone mineral density testing ordered given history of osteopenia, reviewed previous laboratory studies as ordered by prior primary care provider at bedside with patient, continuing otherwise current care, shingles vaccination prescription provided    Essential Hypertension/Blood Pressure Management:   - Home BP Readings: not doing   - Current Control: optimal   - Target BP: <130/80 mmHg   - Relevant BP Meds:  Key CAD CHF Meds               valsartan (DIOVAN) 80 mg tablet (Taking) Take 1 Tablet by mouth daily. pravastatin (PRAVACHOL) 80 mg tablet (Taking) Take 1 Tablet by mouth nightly.  (THIS IS A NEW AND HIGHER DOSE)               - Plan: continue current treatment regimen, continue current meds, dietary sodium restriction, regular aerobic exercise, weight loss   - Notes: [facility] [place]    Hyperlipidemia/Dyslipidemia:   - Summary of Cardiovascular Risks and Goals:     LDL goal is under 100  hypertension  hyperlipidemia  obese   -   Lab Results   Component Value Date/Time    LDL, calculated 129 (H) 03/24/2022 08:05 AM    LDL, calculated 121 (H) 10/22/2021 08:28 AM    HDL Cholesterol 41 (L) 11/21/2022 09:54 AM       - Relevant Cholesterol Meds:  Key Antihyperlipidemia Meds               pravastatin (PRAVACHOL) 80 mg tablet (Taking) Take 1 Tablet by mouth nightly. (THIS IS A NEW AND HIGHER DOSE)              - Cholesterol at target: no   - Does patient meet USPSTF and ACC/AHA indications for pharmacotherapy (e.g., statin): yes   - GI symptoms with meds: NO   - Muscle aches with meds: NO   - Other Adverse effects with meds: NO   - Medication Plan: continue   - Notes: Reasonably close to target, continuing with current care, consider ezetimibe if not at target in future visits    Anxiety/Depression:   - Current PHQ: PHQ 9 Score: 0 (5/12/2022  1:12 PM)    - Current RX:   Key Psychotherapeutic Meds               sertraline (ZOLOFT) 50 mg tablet (Taking) TAKE 1/2 TABLET DAILY    busPIRone (BUSPAR) 5 mg tablet (Taking) Take 1 tab po every 8-12 hours for anxiety           Key Neurological Meds       The patient is on no neurologic meds. - Psychology/Psychiatry Co-managing? No   - Asymptomatic, tolerating current medications well, continuing with current care      I have reviewed the patient's medical history in detail and updated the computerized patient record. We had a prolonged discussion about these complex clinical issues and went over the various important aspects to consider. All questions were answered.       Advised the patient to call back or return to office if symptoms do not improve, change in nature, or persist.     The patient was given an after visit summary or informed of [facility] which includes patient instructions, diagnoses, current medications, & vitals. she expressed understanding with the diagnosis and plan. Bibiana Muse MD    Please note that this dictation was completed with [employer], the computer voice recognition software. Quite often unanticipated grammatical, syntax, homophones, and other interpretive errors are inadvertently transcribed by the computer software. Please disregard these errors. Please excuse any errors that have escaped final proofreading.

## 2022-12-28 ENCOUNTER — OFFICE VISIT (OUTPATIENT)
Dept: INTERNAL MEDICINE CLINIC | Age: 67
End: 2022-12-28
Payer: COMMERCIAL

## 2022-12-28 VITALS
RESPIRATION RATE: 18 BRPM | DIASTOLIC BLOOD PRESSURE: 82 MMHG | OXYGEN SATURATION: 98 % | WEIGHT: 195.6 LBS | HEIGHT: 64 IN | BODY MASS INDEX: 33.39 KG/M2 | SYSTOLIC BLOOD PRESSURE: 122 MMHG | HEART RATE: 62 BPM

## 2022-12-28 DIAGNOSIS — E03.4 HYPOTHYROIDISM DUE TO ACQUIRED ATROPHY OF THYROID: ICD-10-CM

## 2022-12-28 DIAGNOSIS — E78.5 DYSLIPIDEMIA: ICD-10-CM

## 2022-12-28 DIAGNOSIS — Z78.0 POSTMENOPAUSAL: ICD-10-CM

## 2022-12-28 DIAGNOSIS — Z00.00 ROUTINE ADULT HEALTH MAINTENANCE: Primary | ICD-10-CM

## 2022-12-28 DIAGNOSIS — F43.23 ADJUSTMENT DISORDER WITH MIXED ANXIETY AND DEPRESSED MOOD: ICD-10-CM

## 2022-12-28 DIAGNOSIS — I10 ESSENTIAL HYPERTENSION: ICD-10-CM

## 2022-12-28 DIAGNOSIS — Z23 ENCOUNTER FOR IMMUNIZATION: ICD-10-CM

## 2022-12-28 PROCEDURE — 1123F ACP DISCUSS/DSCN MKR DOCD: CPT | Performed by: INTERNAL MEDICINE

## 2022-12-28 PROCEDURE — 3078F DIAST BP <80 MM HG: CPT | Performed by: INTERNAL MEDICINE

## 2022-12-28 PROCEDURE — 3074F SYST BP LT 130 MM HG: CPT | Performed by: INTERNAL MEDICINE

## 2022-12-28 PROCEDURE — 99214 OFFICE O/P EST MOD 30 MIN: CPT | Performed by: INTERNAL MEDICINE

## 2022-12-28 RX ORDER — ZOSTER VACCINE RECOMBINANT, ADJUVANTED 50 MCG/0.5
0.5 KIT INTRAMUSCULAR ONCE
Qty: 1 EACH | Refills: 0 | Status: SHIPPED | OUTPATIENT
Start: 2022-12-28 | End: 2022-12-28

## 2022-12-28 NOTE — PROGRESS NOTES
Chief Complaint   Patient presents with    Establish Care       1. \"Have you been to the ER, urgent care clinic since your last visit? Hospitalized since your last visit? \" No    2. \"Have you seen or consulted any other health care providers outside of the [address] [place] [address] since your last visit? \" No     3. For patients aged 39-70: Has the patient had a colonoscopy / FIT/ Cologuard? No      If the patient is female:    4. For patients aged 41-77: Has the patient had a mammogram within the past 2 years? No      5. For patients aged 21-65: Has the patient had a pap smear?  No

## 2022-12-28 NOTE — PATIENT INSTRUCTIONS
Dual-Energy X-Ray Absorptiometry (DXA) Test: About This Test  What is it? Dual-energy X-ray absorptiometry (DXA) is a test that uses two different X-ray beams to check bone thickness (density) in your spine and hip. This information is used to estimate the strength of your bones. Why is this test done? Bone density testing is often done for:  People who are at risk for osteoporosis, including:  Women who are age 72 and older. Older men. People who take some medications, such as corticosteroids. People who have certain medical conditions, such as hyperparathyroidism. Younger women who have osteoporosis and are at high risk for broken bones. People who have osteoporosis, to see how well treatment is working. How is the test done? You will lie on your back on a padded table. You probably can leave your clothes on, but you will remove any metal buttons or jennifer for the test.  You may need to lie with your legs straight or with your lower legs resting on a platform built into the table. The machine will scan your bones and measure the amount of radiation they absorb. During this test you are exposed to a very low dose of radiation. How long does the test take? The DXA scan, which scans the hip and lower spine, takes about 20 minutes. Other kinds of bone density scans may take 30 to 45 minutes. What happens after the test?  You will probably be able to go home right away. You can go back to your usual activities right away. Follow-up care is a key part of your treatment and safety. Be sure to make and go to all appointments, and call your doctor if you are having problems. It's also a good idea to keep a list of the medicines you take. Ask your doctor when you can expect to have your test results. Where can you learn more?   Go to http://www.gray.com/  Enter G233 in the search box to learn more about \"Dual-Energy X-Ray Absorptiometry (DXA) Test: About This Test.\"  Current as of: May 4, 2022               Content Version: 13.4  © 4837-8905 Healthwise, [employer]. Care instructions adapted under license by [employer] (which disclaims liability or warranty for this information). If you have questions about a medical condition or this instruction, always ask your healthcare professional. [address] any warranty or liability for your use of this information. Learning About the [address] Diet  What is the Mediterranean diet? The Mediterranean diet is a style of eating rather than a diet plan. It features foods eaten in [place], [place], [place] and [place], and other countries along the [facility]. It emphasizes eating foods like fish, fruits, vegetables, beans, high-fiber breads and whole grains, nuts, and olive oil. This style of eating includes limited red meat, cheese, and sweets. Why choose the Mediterranean diet? A Mediterranean-style diet may improve heart health. It contains more fat than other heart-healthy diets. But the fats are mainly from nuts, unsaturated oils (such as fish oils and olive oil), and certain nut or seed oils (such as canola, soybean, or flaxseed oil). These fats may help protect the heart and blood vessels. How can you get started on the Mediterranean diet? Here are some things you can do to switch to a more Mediterranean way of eating. What to eat  Eat a variety of fruits and vegetables each day, such as grapes, blueberries, tomatoes, broccoli, peppers, figs, olives, spinach, eggplant, beans, lentils, and chickpeas. Eat a variety of whole-grain foods each day, such as oats, brown rice, and whole wheat bread, pasta, and couscous. Eat fish at least 2 times a week. Try tuna, salmon, mackerel, lake trout, herring, or sardines. Eat moderate amounts of low-fat dairy products, such as milk, cheese, or yogurt. Eat moderate amounts of poultry and eggs.   Choose healthy (unsaturated) fats, such as nuts, olive oil, and certain nut or seed oils like canola, soybean, and flaxseed. Limit unhealthy (saturated) fats, such as butter, palm oil, and coconut oil. And limit fats found in animal products, such as meat and dairy products made with whole milk. Try to eat red meat only a few times a month in very small amounts. Limit sweets and desserts to only a few times a week. This includes sugar-sweetened drinks like soda. The Mediterranean diet may also include red wine with your meal--1 glass each day for women and up to 2 glasses a day for men. Tips for eating at home  Use herbs, spices, garlic, lemon zest, and citrus juice instead of salt to add flavor to foods. Add avocado slices to your sandwich instead of bingham. Have fish for lunch or dinner instead of red meat. Brush the fish with olive oil, and broil or grill it. Sprinkle your salad with seeds or nuts instead of cheese. Cook with olive or canola oil instead of butter or oils that are high in saturated fat. Switch from 2% milk or whole milk to 1% or fat-free milk. Dip raw vegetables in a vinaigrette dressing or hummus instead of dips made from mayonnaise or sour cream.  Have a piece of fruit for dessert instead of a piece of cake. Try baked apples, or have some dried fruit. Tips for eating out  Try broiled, grilled, baked, or poached fish instead of having it fried or breaded. Ask your  to have your meals prepared with olive oil instead of butter. Order dishes made with marinara sauce or sauces made from olive oil. Avoid sauces made from cream or mayonnaise. Choose whole-grain breads, whole wheat pasta and pizza crust, brown rice, beans, and lentils. Cut back on butter or margarine on bread. Instead, you can dip your bread in a small amount of olive oil. Ask for a side salad or grilled vegetables instead of french fries or chips. Where can you learn more?   Go to http://www.gray.com/  Enter O407 in the search box to learn more about \"Learning About the Mediterranean Diet. \"  Current as of: May 9, 2022               Content Version: 13.4  © 4472-2902 Healthwise, [employer]. Care instructions adapted under license by [employer] (which disclaims liability or warranty for this information). If you have questions about a medical condition or this instruction, always ask your healthcare professional. [address] any warranty or liability for your use of this information.

## 2023-01-17 ENCOUNTER — HOSPITAL ENCOUNTER (OUTPATIENT)
Dept: MAMMOGRAPHY | Age: 68
Discharge: HOME | End: 2023-01-17
Payer: COMMERCIAL

## 2023-01-17 DIAGNOSIS — Z12.31 ENCOUNTER FOR SCREENING MAMMOGRAM FOR MALIGNANT NEOPLASM OF BREAST: ICD-10-CM

## 2023-01-17 PROCEDURE — 77063 BREAST TOMOSYNTHESIS BI: CPT

## 2023-01-20 ENCOUNTER — OFFICE VISIT (OUTPATIENT)
Dept: INTERNAL MEDICINE CLINIC | Age: 68
End: 2023-01-20
Payer: COMMERCIAL

## 2023-01-20 VITALS
WEIGHT: 195.5 LBS | RESPIRATION RATE: 15 BRPM | TEMPERATURE: 98.7 F | HEIGHT: 64 IN | OXYGEN SATURATION: 96 % | HEART RATE: 84 BPM | DIASTOLIC BLOOD PRESSURE: 78 MMHG | BODY MASS INDEX: 33.38 KG/M2 | SYSTOLIC BLOOD PRESSURE: 130 MMHG

## 2023-01-20 DIAGNOSIS — L50.9 URTICARIA: ICD-10-CM

## 2023-01-20 DIAGNOSIS — J45.41 MODERATE PERSISTENT ASTHMATIC BRONCHITIS WITH ACUTE EXACERBATION: Primary | ICD-10-CM

## 2023-01-20 PROCEDURE — 99213 OFFICE O/P EST LOW 20 MIN: CPT | Performed by: PHYSICIAN ASSISTANT

## 2023-01-20 PROCEDURE — 1123F ACP DISCUSS/DSCN MKR DOCD: CPT | Performed by: PHYSICIAN ASSISTANT

## 2023-01-20 RX ORDER — AZITHROMYCIN 250 MG/1
TABLET, FILM COATED ORAL
Qty: 6 TABLET | Refills: 0 | Status: SHIPPED | OUTPATIENT
Start: 2023-01-20

## 2023-01-20 RX ORDER — PREDNISONE 10 MG/1
TABLET ORAL
Qty: 43 TABLET | Refills: 0 | Status: SHIPPED | OUTPATIENT
Start: 2023-01-20

## 2023-01-20 RX ORDER — NYSTATIN 100000 [USP'U]/ML
500000 SUSPENSION ORAL 4 TIMES DAILY
Qty: 200 ML | Refills: 0 | Status: SHIPPED | OUTPATIENT
Start: 2023-01-20 | End: 2023-01-30

## 2023-01-20 NOTE — PROGRESS NOTES
Subjective:   Sydney David is a 78 y.o. female who complains of coryza, congestion, sore throat, nasal blockage, post nasal drip, productive cough, and cough described as productive of brown sputum, painful, wheezing at night for 12 days, gradually worsening since that time. She denies a history of chills, fevers, and shortness of breath. Tried OTC cold remedies with temporary relief. No evaluation to date. Home covid test negative  Using albuterol nebulizer TID-QID x 1 wk  Taking mucinex BID  Receives Xolair shots for urticaria, sees allergist  Has asthma followed by pulmonary, Dr Aliyah Rodriguez  On Advair 500 BID, Allegra qaM, Zyrtec qPM, Benadryl 1-2 pills QHS (for the urticaria), Singulair 10 mg  Before the pandemic, she typically would get bronchitis requiring steroid 3-4x/year, but was quiescent during the lockdown. Last had steroids for the urticaria treatment in fall 2022 perhaps. Typically requires the high dose prednisone for 12-14 days or she has a relapse  -wonders if she has a thrush already since her tongue looked white this morning    Past Medical History:   Diagnosis Date    Adjustment disorder 08/2012    Dr. Tierra Frazier. Manoj Bowen, counselor    Allergic rhinitis 2016    Dr. Granados Late    Dr. Yoni Guerrero, pul. [facility]. Bilateral shoulder pain 2019    R partial RCT, OA GH and AC joint. Dr. Suzanne Linder    Depression     Diverticulosis 10/2018    pandiverticulosis. Dr. Unruly PIZARROD (degenerative joint disease) of hip 09/19/2012    Dr. Ankit Briseno    Eczema 2016    Dr. Zuly Cole    Granulomatous lung disease ([facility] [address] 2017    stable. Dr. Ralph Cisneros. Hearing aid worn 09/2014    Bilateral ears. History of bacterial pneumonia 1981    x 2    History of chickenpox childhood    History of colon polyps     Dr. Gabriela aCsanova    History of common wart 05/2015    Cutaneous wart - Right thumb. [facility]. Dr. Cameron Bonner.     History of echocardiogram 12/2020    TTE 12/20 - LV: Estimated LVEF is >70%. Biplane method used to measure ejection fraction. Normal cavity size and systolic function (ejection fraction normal). Mild concentric hypertrophy. Wall motion: normal. Mild (grade 1) left ventricular diastolic dysfunction. Mild MR. History of hematuria 04/1988    History of mumps childhood    History of ovarian cyst 02/21/2008    Left and Rt . Mrs. Kodak Woodall    History of receipt of hepatitis B immune globulin 03/2014    Prior vaccine--[id number]. History of scarlet fever 1971    History of sebaceous cyst 05/2015    Digital mucous cyst Left IP joint. Dr. Florecita Johnson. Hypercholesterolemia 1976    Hypothyroidism (acquired) 10/16/2006    Incidental pulmonary nodule 2019    RUL. Dr. Obed Medellin. Measles childhood    NAFL (nonalcoholic fatty liver) 6674    Osteoarthritis, multiple sites     having surgery on 2/12/13 left    Osteopenia     PONV (postoperative nausea and vomiting)     Postmenopausal 11/2006    Uterine fibroid 02/21/2008    Kodak Woodall, Midwife    Vitamin D deficiency      Social History     Tobacco Use    Smoking status: Never     Passive exposure: Yes    Smokeless tobacco: Never    Tobacco comments:     grew up with smoker parents x 20 yrs   Vaping Use    Vaping Use: Never used   Substance Use Topics    Alcohol use: Yes     Comment: one beer every few months    Drug use: No     Outpatient Medications Marked as Taking for the 1/20/23 encounter (Office Visit) with Corina Burnette PA-C   Medication Sig Dispense Refill    sertraline (ZOLOFT) 50 mg tablet TAKE 1/2 TABLET DAILY 45 Tablet 1    levothyroxine (SYNTHROID) 100 mcg tablet Take 1 Tablet by mouth Daily (before breakfast). 90 Tablet 1    EPINEPHrine (EPIPEN) 0.3 mg/0.3 mL injection       Xolair 150 mg/mL syrg       busPIRone (BUSPAR) 5 mg tablet Take 1 tab po every 8-12 hours for anxiety 270 Tablet 1    valsartan (DIOVAN) 80 mg tablet Take 1 Tablet by mouth daily.  [address] Tablet 1    pravastatin (PRAVACHOL) 80 mg tablet Take 1 Tablet by mouth nightly. (THIS IS A NEW AND HIGHER DOSE) 90 Tablet 1    fexofenadine (ALLEGRA) 180 mg tablet Take 180 mg by mouth daily. Blood Pressure Monitor kit Check blood pressure daily. Dx I10 1 Kit 0    montelukast (SINGULAIR) 10 mg tablet TAKE 1 TABLET BY MOUTH DAILY FOR ALLERGIES AND ASTHMA. 90 Tablet 3    ADVAIR DISKUS 500-50 mcg/dose diskus inhaler INHALE 1 PUFF TWICE A DAY 1 Inhaler 0    Rmmofuaa-Pdcr-Mcpiyo-Hyalur Ac 693-205-81-2 mg cap Take  by mouth. cetirizine (ZYRTEC) 10 mg tablet Take 10 mg by mouth daily as needed. cholecalciferol, VITAMIN D3, (VITAMIN D3) 5,000 unit tab tablet Take  by mouth daily. multivitamin (ONE A DAY) tablet Take 1 Tab by mouth daily. albuterol-ipratropium (DUO-NEB) 2.5 mg-0.5 mg/3 ml nebulizer solution 3 mL by Nebulization route every six (6) hours. 30 Vial 1    Nebulizer & Compressor machine 1 each by Does Not Apply route daily. 1 each 0    PROAIR HFA 90 mcg/actuation inhaler INHALE 1-2 PUFFS EVERY 4-6 HOURS AS NEEDED 1 Inhaler 5     Allergies   Allergen Reactions    Aspirin Other (comments)     Triggers asthma & causes chest tightness but takes 325 mg \"once in a while\"    Lipitor [Atorvastatin] Myalgia     Elevated CPK    Sulfa (Sulfonamide Antibiotics) Rash    Erythromycin Nausea and Vomiting    Penicillins Nausea and Vomiting     Many years ago        Review of Systems  A comprehensive review of systems was negative except for that written in the HPI. Objective:     Visit Vitals  /78 (BP 1 Location: Left upper arm, BP Patient Position: Sitting, BP Cuff Size: Large adult)   Pulse 84   Temp 98.7 °F (37.1 °C) (Oral)   Resp 15   Ht 5' 4\" (1.626 m)   Wt 195 lb 8 oz (88.7 kg)   SpO2 96%   BMI 33.56 kg/m²     General:   alert, cooperative   Eyes: conjunctivae/scleras clear.  PERRL, EOM's intact   Ears: External auditory canals clear, tympanic membranes clear   Sinuses/Nose: No maxillary or frontal tenderness. clear rhinorrhea present. Mouth:  No oral lesions, no pharyngeal erythema, no exudates  No plaques or white film of tongue to suggest a thrush   Neck: Supple, trachea midline   Heart: S1 and S2 normal,no murmurs noted    Lungs: Mostly clear to auscultation bilaterally with a (+)scattered end expiratory wheeze, no increased work of breathing   Abdomen: Soft, nontender. Normal bowel sounds   Extremities: No edema or cyanosis          No results found for this visit on 01/20/23. Assessment/Plan:     1. Moderate persistent asthmatic bronchitis with acute exacerbation    2. Urticaria      Symptoms consistent with exacerbation of asthmatic bronchitis  She does have significant asthma and urticaria followed by both allergy and pulmonary, on Xolair, Advair, Singulair, Allegra in the morning and Zyrtec at night, Benadryl at night  She is to continue all baseline medications and Mucinex  Will add Z-Jefferson and prednisone taper  She will follow-up if worsening and otherwise as needed  Chest x-ray deferred today since she has not been having fevers and no localizing lung sounds, but low threshold to obtain CXR if she is not improving as expected  No thrush on exam but has history of same, increased risk now on both an antibiotic and steroids. She will eat yogurt daily and I sent in prescription for nystatin in case she develops symptoms of thrush. Orders Placed This Encounter    azithromycin (ZITHROMAX) 250 mg tablet     Sig: Take 2 tablets today, then take 1 tablet daily. Dispense:  6 Tablet     Refill:  0    predniSONE (DELTASONE) 10 mg tablet     Sig: Take 6 tabs po daily x 2 d, 5 tabs x 2d, 4 tabs x 2d, 3 tabs x 2d, 2 tabs x 2d, 1 tab x 2d, then 1/2 tab x 2d, then stop     Dispense:  43 Tablet     Refill:  0    nystatin (MYCOSTATIN) 100,000 unit/mL suspension     Sig: Take 5 mL by mouth four (4) times daily for 10 days.  swish and spit     Dispense:  200 mL     Refill:  0         Verbal and written instructions (see AVS) provided. Patient expresses understanding of diagnosis and treatment plan.

## 2023-01-20 NOTE — PROGRESS NOTES
Chief Complaint   Patient presents with    Sinus Infection     Patient states she has an sinus infection for a week and starting to move down to chest, SOB. Covid test is neg. Patient had use nebulizer, mucinex and normal medication that she was prescribe d.       1. \"Have you been to the ER, urgent care clinic since your last visit? Hospitalized since your last visit? \" No    2. \"Have you seen or consulted any other health care providers outside of the [address] [place] [address] since your last visit? \" No     3. For patients aged 39-70: Has the patient had a colonoscopy / FIT/ Cologuard? No      If the patient is female:    4. For patients aged 41-77: Has the patient had a mammogram within the past 2 years? No      5. For patients aged 21-65: Has the patient had a pap smear?  No

## 2023-03-02 DIAGNOSIS — J30.1 SEASONAL ALLERGIC RHINITIS DUE TO POLLEN: ICD-10-CM

## 2023-03-02 RX ORDER — MONTELUKAST SODIUM 10 MG/1
TABLET ORAL
Qty: 30 TABLET | Refills: 0 | Status: SHIPPED | OUTPATIENT
Start: 2023-03-02

## 2023-03-13 DIAGNOSIS — I10 ESSENTIAL HYPERTENSION: Primary | ICD-10-CM

## 2023-03-13 RX ORDER — AMLODIPINE BESYLATE 2.5 MG/1
2.5 TABLET ORAL DAILY
Qty: 90 TABLET | Refills: 3 | Status: SHIPPED | OUTPATIENT
Start: 2023-03-13

## 2023-03-20 DIAGNOSIS — E03.4 HYPOTHYROIDISM DUE TO ACQUIRED ATROPHY OF THYROID: ICD-10-CM

## 2023-03-20 DIAGNOSIS — I10 ESSENTIAL HYPERTENSION: ICD-10-CM

## 2023-03-21 DIAGNOSIS — I10 ESSENTIAL HYPERTENSION: ICD-10-CM

## 2023-03-21 RX ORDER — VALSARTAN 80 MG/1
80 TABLET ORAL DAILY
Qty: 90 TABLET | Refills: 1 | Status: SHIPPED | OUTPATIENT
Start: 2023-03-21 | End: 2023-03-22

## 2023-03-21 NOTE — TELEPHONE ENCOUNTER
PCP: Lenora Singer MD    Last appt: 1/20/2023  Future Appointments   Date Time Provider Shahla Farmer   1/5/2024  8:00 AM Lenora Singer MD [facility] AMB       Requested Prescriptions     Pending Prescriptions Disp Refills    valsartan (DIOVAN) 80 mg tablet 90 Tablet 1     Sig: Take 1 Tablet by mouth daily.        Prior labs and Blood pressures:  BP Readings from Last 3 Encounters:   01/20/23 130/78   12/28/22 122/82   11/21/22 133/84     Lab Results   Component Value Date/Time    Sodium 142 11/21/2022 09:54 AM    Potassium 4.4 11/21/2022 09:54 AM    Chloride 106 11/21/2022 09:54 AM    CO2 29 11/21/2022 09:54 AM    Anion gap 4 (L) 10/22/2021 08:28 AM    Glucose 79 11/21/2022 09:54 AM    BUN 17 11/21/2022 09:54 AM    Creatinine 0.83 11/21/2022 09:54 AM    BUN/Creatinine ratio NOT APPLICABLE 97/63/7972 32:29 AM    GFR est AA >60 10/22/2021 08:28 AM    GFR est non-AA 58 (L) 10/22/2021 08:28 AM    Calcium 9.4 11/21/2022 09:54 AM     Lab Results   Component Value Date/Time    Hemoglobin A1c 5.2 11/21/2022 09:54 AM     Lab Results   Component Value Date/Time    Cholesterol, total 178 11/21/2022 09:54 AM    HDL Cholesterol 41 (L) 11/21/2022 09:54 AM    LDL-CHOLESTEROL 107 (H) 11/21/2022 09:54 AM    LDL, calculated 129 (H) 03/24/2022 08:05 AM    LDL, calculated 121 (H) 10/22/2021 08:28 AM    VLDL, calculated 32 03/24/2022 08:05 AM    VLDL, calculated 43 10/22/2021 08:28 AM    Triglyceride 181 (H) 11/21/2022 09:54 AM    CHOL/HDL Ratio 4.3 10/22/2021 08:28 AM    Cholesterol/HDL ratio 4.3 11/21/2022 09:54 AM     Lab Results   Component Value Date/Time    Vitamin D 25-Hydroxy 43.8 06/21/2011 08:00 AM    VITAMIN D, 25-HYDROXY 38.5 08/19/2020 08:56 AM       Lab Results   Component Value Date/Time    TSH 0.74 11/21/2022 09:54 AM    TSH, 3rd generation 0.05 (L) 04/13/2021 02:29 PM

## 2023-03-22 RX ORDER — VALSARTAN 80 MG/1
80 TABLET ORAL DAILY
Qty: 90 TABLET | Refills: 0 | Status: SHIPPED | OUTPATIENT
Start: 2023-03-22

## 2023-03-22 RX ORDER — LEVOTHYROXINE SODIUM 100 UG/1
TABLET ORAL
Qty: 30 TABLET | Refills: 0 | Status: SHIPPED | OUTPATIENT
Start: 2023-03-22

## 2023-03-30 DIAGNOSIS — E78.5 DYSLIPIDEMIA: ICD-10-CM

## 2023-03-30 RX ORDER — PRAVASTATIN SODIUM 80 MG/1
TABLET ORAL
Qty: 30 TABLET | Refills: 0 | Status: SHIPPED | OUTPATIENT
Start: 2023-03-30

## 2023-04-28 DIAGNOSIS — E78.5 DYSLIPIDEMIA: ICD-10-CM

## 2023-04-28 DIAGNOSIS — F43.23 ADJUSTMENT DISORDER WITH MIXED ANXIETY AND DEPRESSED MOOD: ICD-10-CM

## 2023-04-28 DIAGNOSIS — J30.1 SEASONAL ALLERGIC RHINITIS DUE TO POLLEN: ICD-10-CM

## 2023-04-28 DIAGNOSIS — I10 ESSENTIAL HYPERTENSION: ICD-10-CM

## 2023-04-28 DIAGNOSIS — E03.4 HYPOTHYROIDISM DUE TO ACQUIRED ATROPHY OF THYROID: ICD-10-CM

## 2023-04-28 RX ORDER — PRAVASTATIN SODIUM 80 MG/1
80 TABLET ORAL DAILY
Qty: 30 TABLET | Refills: 0 | Status: SHIPPED | OUTPATIENT
Start: 2023-04-28

## 2023-04-28 RX ORDER — VALSARTAN 80 MG/1
80 TABLET ORAL DAILY
Qty: 90 TABLET | Refills: 0 | Status: SHIPPED | OUTPATIENT
Start: 2023-04-28

## 2023-04-28 RX ORDER — BUSPIRONE HYDROCHLORIDE 5 MG/1
TABLET ORAL
Qty: 270 TABLET | Refills: 1 | Status: SHIPPED | OUTPATIENT
Start: 2023-04-28

## 2023-04-28 RX ORDER — LEVOTHYROXINE SODIUM 100 UG/1
TABLET ORAL
Qty: 30 TABLET | Refills: 0 | Status: SHIPPED | OUTPATIENT
Start: 2023-04-28

## 2023-04-28 RX ORDER — AMLODIPINE BESYLATE 2.5 MG/1
2.5 TABLET ORAL DAILY
Qty: 90 TABLET | Refills: 3 | Status: SHIPPED | OUTPATIENT
Start: 2023-04-28

## 2023-04-28 RX ORDER — SERTRALINE HYDROCHLORIDE 50 MG/1
TABLET, FILM COATED ORAL
Qty: 45 TABLET | Refills: 1 | Status: SHIPPED | OUTPATIENT
Start: 2023-04-28

## 2023-04-28 RX ORDER — MONTELUKAST SODIUM 10 MG/1
TABLET ORAL
Qty: 30 TABLET | Refills: 0 | Status: SHIPPED | OUTPATIENT
Start: 2023-04-28

## 2023-04-28 NOTE — TELEPHONE ENCOUNTER
PCP: Lavern Orr MD    Last appt: 1/20/2023  No future appointments. Requested Prescriptions     Pending Prescriptions Disp Refills    valsartan (DIOVAN) 80 mg tablet 90 Tablet 0     Sig: Take 1 Tablet by mouth daily. pravastatin (PRAVACHOL) 80 mg tablet 30 Tablet 0    busPIRone (BUSPAR) 5 mg tablet 270 Tablet 1     Sig: Take 1 tab po every 8-12 hours for anxiety    montelukast (SINGULAIR) 10 mg tablet 30 Tablet 0     Sig: TAKE 1 TABLET BY MOUTH DAILY FOR ALLERGIES ANDASTHMA.    amLODIPine (NORVASC) 2.5 mg tablet 90 Tablet 3     Sig: Take 1 Tablet by mouth daily.     sertraline (ZOLOFT) 50 mg tablet 45 Tablet 1     Sig: TAKE 1/2 TABLET DAILY    levothyroxine (SYNTHROID) 100 mcg tablet 30 Tablet 0       Prior labs and Blood pressures:  BP Readings from Last 3 Encounters:   01/20/23 130/78   12/28/22 122/82   11/21/22 133/84     Lab Results   Component Value Date/Time    Sodium 142 11/21/2022 09:54 AM    Potassium 4.4 11/21/2022 09:54 AM    Chloride 106 11/21/2022 09:54 AM    CO2 29 11/21/2022 09:54 AM    Anion gap 4 (L) 10/22/2021 08:28 AM    Glucose 79 11/21/2022 09:54 AM    BUN 17 11/21/2022 09:54 AM    Creatinine 0.83 11/21/2022 09:54 AM    BUN/Creatinine ratio NOT APPLICABLE 37/91/0012 08:65 AM    GFR est AA >60 10/22/2021 08:28 AM    GFR est non-AA 58 (L) 10/22/2021 08:28 AM    Calcium 9.4 11/21/2022 09:54 AM     Lab Results   Component Value Date/Time    Hemoglobin A1c 5.2 11/21/2022 09:54 AM     Lab Results   Component Value Date/Time    Cholesterol, total 178 11/21/2022 09:54 AM    HDL Cholesterol 41 (L) 11/21/2022 09:54 AM    LDL-CHOLESTEROL 107 (H) 11/21/2022 09:54 AM    LDL, calculated 129 (H) 03/24/2022 08:05 AM    LDL, calculated 121 (H) 10/22/2021 08:28 AM    VLDL, calculated 32 03/24/2022 08:05 AM    VLDL, calculated 43 10/22/2021 08:28 AM    Triglyceride 181 (H) 11/21/2022 09:54 AM    CHOL/HDL Ratio 4.3 10/22/2021 08:28 AM    Cholesterol/HDL ratio 4.3 11/21/2022 09:54 AM     Lab Results Component Value Date/Time    Vitamin D 25-Hydroxy 43.8 06/21/2011 08:00 AM    VITAMIN D, 25-HYDROXY 38.5 08/19/2020 08:56 AM       Lab Results   Component Value Date/Time    TSH 0.74 11/21/2022 09:54 AM    TSH, 3rd generation 0.05 (L) 04/13/2021 02:29 PM

## 2023-04-28 NOTE — TELEPHONE ENCOUNTER
Please adjust all prescriptions to 90 day supply per patient this is only what is accepted by her insurance.

## 2023-05-09 RX ORDER — PRAVASTATIN SODIUM 80 MG/1
80 TABLET ORAL DAILY
Qty: 90 TABLET | Refills: 3 | Status: SHIPPED | OUTPATIENT
Start: 2023-05-09

## 2023-05-09 RX ORDER — MONTELUKAST SODIUM 10 MG/1
TABLET ORAL
Qty: 90 TABLET | Refills: 3 | Status: SHIPPED | OUTPATIENT
Start: 2023-05-09

## 2023-05-09 RX ORDER — LEVOTHYROXINE SODIUM 0.1 MG/1
100 TABLET ORAL
Qty: 90 TABLET | Refills: 3 | Status: SHIPPED | OUTPATIENT
Start: 2023-05-09

## 2023-05-09 NOTE — TELEPHONE ENCOUNTER
Requested Prescriptions     Pending Prescriptions Disp Refills    levothyroxine (SYNTHROID) 100 MCG tablet 90 tablet 3     Sig: Take 1 tablet by mouth every morning (before breakfast)    montelukast (SINGULAIR) 10 MG tablet 90 tablet 3     Sig: TAKE 1 TABLET BY MOUTH DAILY FOR ALLERGIES ANDASTHMA.    pravastatin (PRAVACHOL) 80 MG tablet 90 tablet 3     Sig: Take 1 tablet by mouth daily       RX refill request from the patient/pharmacy. Patient last seen 1/20/23 without labs, and next appt. scheduled for 1/5/24.

## 2023-11-27 NOTE — TELEPHONE ENCOUNTER
Pharmacy: [place] [id number]  Patient has been out of sertraline medication for three days. States she is noticing some symptoms.    Patient given contact info to schedule appointment with [facility].    valsartan (DIOVAN) 80 MG tablet [[id number]]     Order Details  Dose: -- Route: Oral Frequency: DAILY   Dispense Quantity: -- Refills: --          Sig: Take by mouth daily     sertraline (ZOLOFT) 50 MG tablet [[id number]]     Order Details  Dose, Route, Frequency: As Directed   Dispense Quantity: -- Refills: --          Sig: TAKE 1/2 TABLET DAILY

## 2023-11-27 NOTE — TELEPHONE ENCOUNTER
PCP: Estela Villalpando MD    Last appt: 12/28/2022  No future appointments.     Requested Prescriptions     Pending Prescriptions Disp Refills    valsartan (DIOVAN) 80 MG tablet 90 tablet 1     Sig: Take 1 tablet by mouth daily    sertraline (ZOLOFT) 50 MG tablet 90 tablet 0     Sig: Take 0.5 tablets by mouth daily       Prior labs and Blood pressures:  BP Readings from Last 3 Encounters:   01/20/23 130/78   12/28/22 122/82   11/21/22 133/84     Lab Results   Component Value Date/Time     11/21/2022 09:54 AM    K 4.4 11/21/2022 09:54 AM     11/21/2022 09:54 AM    CO2 29 11/21/2022 09:54 AM    BUN 17 11/21/2022 09:54 AM    GFRAA >60 10/22/2021 08:28 AM     No results found for: \"HBA1C\", \"[id number]\"  Lab Results   Component Value Date/Time    CHOL 178 11/21/2022 09:54 AM    HDL 41 11/21/2022 09:54 AM    VLDL 32 03/24/2022 08:05 AM    VLDL 50 04/13/2021 02:29 PM     No results found for: \"VITD3\", \"VD3RIA\"        Lab Results   Component Value Date/Time    TSH 0.74 11/21/2022 09:54 AM

## 2023-11-28 RX ORDER — VALSARTAN 80 MG/1
80 TABLET ORAL DAILY
Qty: 90 TABLET | Refills: 1 | Status: SHIPPED | OUTPATIENT
Start: 2023-11-28

## 2024-03-12 ENCOUNTER — TRANSCRIBE ORDERS (OUTPATIENT)
Facility: HOSPITAL | Age: 69
End: 2024-03-12

## 2024-03-12 DIAGNOSIS — Z12.31 VISIT FOR SCREENING MAMMOGRAM: Primary | ICD-10-CM

## 2024-03-27 ENCOUNTER — HOSPITAL ENCOUNTER (OUTPATIENT)
Facility: HOSPITAL | Age: 69
Discharge: HOME | End: 2024-03-30
Attending: STUDENT IN AN ORGANIZED HEALTH CARE EDUCATION/TRAINING PROGRAM
Payer: MEDICARE

## 2024-03-27 VITALS — WEIGHT: 195 LBS | BODY MASS INDEX: 33.29 KG/M2 | HEIGHT: 64 IN

## 2024-03-27 DIAGNOSIS — Z12.31 VISIT FOR SCREENING MAMMOGRAM: ICD-10-CM

## 2024-03-27 PROCEDURE — 77063 BREAST TOMOSYNTHESIS BI: CPT

## 2024-03-28 ENCOUNTER — TRANSCRIBE ORDERS (OUTPATIENT)
Facility: HOSPITAL | Age: 69
End: 2024-03-28

## 2024-03-28 ENCOUNTER — HOSPITAL ENCOUNTER (OUTPATIENT)
Facility: HOSPITAL | Age: 69
Discharge: HOME | End: 2024-03-28
Attending: ORTHOPAEDIC SURGERY
Payer: MEDICARE

## 2024-03-28 DIAGNOSIS — S63.412A: ICD-10-CM

## 2024-03-28 DIAGNOSIS — S63.412A: Primary | ICD-10-CM

## 2024-03-28 PROCEDURE — 73218 MRI UPPER EXTREMITY W/O DYE: CPT

## 2024-04-11 ENCOUNTER — OFFICE VISIT (OUTPATIENT)
Age: 69
End: 2024-04-11
Payer: MEDICARE

## 2024-04-11 VITALS
DIASTOLIC BLOOD PRESSURE: 72 MMHG | HEIGHT: 64 IN | WEIGHT: 193 LBS | OXYGEN SATURATION: 98 % | BODY MASS INDEX: 32.95 KG/M2 | SYSTOLIC BLOOD PRESSURE: 110 MMHG | HEART RATE: 66 BPM | RESPIRATION RATE: 18 BRPM | TEMPERATURE: 98.3 F

## 2024-04-11 DIAGNOSIS — K76.0 FATTY LIVER: ICD-10-CM

## 2024-04-11 DIAGNOSIS — I10 PRIMARY HYPERTENSION: ICD-10-CM

## 2024-04-11 DIAGNOSIS — Z12.11 ENCOUNTER FOR SCREENING FOR MALIGNANT NEOPLASM OF COLON: ICD-10-CM

## 2024-04-11 DIAGNOSIS — J84.10 GRANULOMATOUS LUNG DISEASE (HCC): ICD-10-CM

## 2024-04-11 DIAGNOSIS — F32.5 MAJOR DEPRESSIVE DISORDER, SINGLE EPISODE, IN FULL REMISSION (HCC): ICD-10-CM

## 2024-04-11 DIAGNOSIS — E03.4 HYPOTHYROIDISM DUE TO ACQUIRED ATROPHY OF THYROID: Primary | ICD-10-CM

## 2024-04-11 DIAGNOSIS — E78.5 DYSLIPIDEMIA: ICD-10-CM

## 2024-04-11 LAB
ALBUMIN SERPL-MCNC: 4.4 G/DL (ref 3.5–5)
ALBUMIN/GLOB SERPL: 1.6 (ref 1.1–2.2)
ALP SERPL-CCNC: 64 U/L (ref 45–117)
ALT SERPL-CCNC: 38 U/L (ref 12–78)
ANION GAP SERPL CALC-SCNC: 4 MMOL/L (ref 5–15)
AST SERPL-CCNC: 16 U/L (ref 15–37)
BASOPHILS # BLD: 0 K/UL (ref 0–0.1)
BASOPHILS NFR BLD: 0 % (ref 0–1)
BILIRUB SERPL-MCNC: 0.6 MG/DL (ref 0.2–1)
BUN SERPL-MCNC: 21 MG/DL (ref 6–20)
BUN/CREAT SERPL: 21 (ref 12–20)
CALCIUM SERPL-MCNC: 9.9 MG/DL (ref 8.5–10.1)
CHLORIDE SERPL-SCNC: 107 MMOL/L (ref 97–108)
CHOLEST SERPL-MCNC: 221 MG/DL
CO2 SERPL-SCNC: 28 MMOL/L (ref 21–32)
CREAT SERPL-MCNC: 1.01 MG/DL (ref 0.55–1.02)
DIFFERENTIAL METHOD BLD: NORMAL
EOSINOPHIL # BLD: 0.1 K/UL (ref 0–0.4)
EOSINOPHIL NFR BLD: 3 % (ref 0–7)
ERYTHROCYTE [DISTWIDTH] IN BLOOD BY AUTOMATED COUNT: 12.7 % (ref 11.5–14.5)
GLOBULIN SER CALC-MCNC: 2.8 G/DL (ref 2–4)
GLUCOSE SERPL-MCNC: 98 MG/DL (ref 65–100)
HCT VFR BLD AUTO: 43.3 % (ref 35–47)
HDLC SERPL-MCNC: 60 MG/DL
HDLC SERPL: 3.7 (ref 0–5)
HGB BLD-MCNC: 14 G/DL (ref 11.5–16)
IMM GRANULOCYTES # BLD AUTO: 0 K/UL (ref 0–0.04)
IMM GRANULOCYTES NFR BLD AUTO: 0 % (ref 0–0.5)
LDLC SERPL CALC-MCNC: 137.6 MG/DL (ref 0–100)
LYMPHOCYTES # BLD: 1.5 K/UL (ref 0.8–3.5)
LYMPHOCYTES NFR BLD: 30 % (ref 12–49)
MCH RBC QN AUTO: 28.9 PG (ref 26–34)
MCHC RBC AUTO-ENTMCNC: 32.3 G/DL (ref 30–36.5)
MCV RBC AUTO: 89.5 FL (ref 80–99)
MONOCYTES # BLD: 0.4 K/UL (ref 0–1)
MONOCYTES NFR BLD: 7 % (ref 5–13)
NEUTS SEG # BLD: 3 K/UL (ref 1.8–8)
NEUTS SEG NFR BLD: 60 % (ref 32–75)
NRBC # BLD: 0 K/UL (ref 0–0.01)
NRBC BLD-RTO: 0 PER 100 WBC
PLATELET # BLD AUTO: 219 K/UL (ref 150–400)
PMV BLD AUTO: 10.3 FL (ref 8.9–12.9)
POTASSIUM SERPL-SCNC: 4.4 MMOL/L (ref 3.5–5.1)
PROT SERPL-MCNC: 7.2 G/DL (ref 6.4–8.2)
RBC # BLD AUTO: 4.84 M/UL (ref 3.8–5.2)
SODIUM SERPL-SCNC: 139 MMOL/L (ref 136–145)
T4 FREE SERPL-MCNC: 1 NG/DL (ref 0.8–1.5)
TRIGL SERPL-MCNC: 117 MG/DL
TSH SERPL DL<=0.05 MIU/L-ACNC: 1.02 UIU/ML (ref 0.36–3.74)
VLDLC SERPL CALC-MCNC: 23.4 MG/DL
WBC # BLD AUTO: 5 K/UL (ref 3.6–11)

## 2024-04-11 PROCEDURE — 99214 OFFICE O/P EST MOD 30 MIN: CPT | Performed by: STUDENT IN AN ORGANIZED HEALTH CARE EDUCATION/TRAINING PROGRAM

## 2024-04-11 PROCEDURE — G8417 CALC BMI ABV UP PARAM F/U: HCPCS | Performed by: STUDENT IN AN ORGANIZED HEALTH CARE EDUCATION/TRAINING PROGRAM

## 2024-04-11 PROCEDURE — 3074F SYST BP LT 130 MM HG: CPT | Performed by: STUDENT IN AN ORGANIZED HEALTH CARE EDUCATION/TRAINING PROGRAM

## 2024-04-11 PROCEDURE — 1090F PRES/ABSN URINE INCON ASSESS: CPT | Performed by: STUDENT IN AN ORGANIZED HEALTH CARE EDUCATION/TRAINING PROGRAM

## 2024-04-11 PROCEDURE — 3017F COLORECTAL CA SCREEN DOC REV: CPT | Performed by: STUDENT IN AN ORGANIZED HEALTH CARE EDUCATION/TRAINING PROGRAM

## 2024-04-11 PROCEDURE — G8427 DOCREV CUR MEDS BY ELIG CLIN: HCPCS | Performed by: STUDENT IN AN ORGANIZED HEALTH CARE EDUCATION/TRAINING PROGRAM

## 2024-04-11 PROCEDURE — 1123F ACP DISCUSS/DSCN MKR DOCD: CPT | Performed by: STUDENT IN AN ORGANIZED HEALTH CARE EDUCATION/TRAINING PROGRAM

## 2024-04-11 PROCEDURE — 3078F DIAST BP <80 MM HG: CPT | Performed by: STUDENT IN AN ORGANIZED HEALTH CARE EDUCATION/TRAINING PROGRAM

## 2024-04-11 PROCEDURE — G8399 PT W/DXA RESULTS DOCUMENT: HCPCS | Performed by: STUDENT IN AN ORGANIZED HEALTH CARE EDUCATION/TRAINING PROGRAM

## 2024-04-11 PROCEDURE — 1036F TOBACCO NON-USER: CPT | Performed by: STUDENT IN AN ORGANIZED HEALTH CARE EDUCATION/TRAINING PROGRAM

## 2024-04-11 RX ORDER — FAMOTIDINE 10 MG
10 TABLET ORAL 2 TIMES DAILY
COMMUNITY

## 2024-04-11 RX ORDER — AMLODIPINE BESYLATE 2.5 MG/1
2.5 TABLET ORAL DAILY
COMMUNITY
Start: 2023-04-28

## 2024-04-11 SDOH — ECONOMIC STABILITY: FOOD INSECURITY: WITHIN THE PAST 12 MONTHS, YOU WORRIED THAT YOUR FOOD WOULD RUN OUT BEFORE YOU GOT MONEY TO BUY MORE.: NEVER TRUE

## 2024-04-11 SDOH — ECONOMIC STABILITY: INCOME INSECURITY: HOW HARD IS IT FOR YOU TO PAY FOR THE VERY BASICS LIKE FOOD, HOUSING, MEDICAL CARE, AND HEATING?: NOT HARD AT ALL

## 2024-04-11 SDOH — ECONOMIC STABILITY: HOUSING INSECURITY
IN THE LAST 12 MONTHS, WAS THERE A TIME WHEN YOU DID NOT HAVE A STEADY PLACE TO SLEEP OR SLEPT IN A SHELTER (INCLUDING NOW)?: NO

## 2024-04-11 SDOH — ECONOMIC STABILITY: FOOD INSECURITY: WITHIN THE PAST 12 MONTHS, THE FOOD YOU BOUGHT JUST DIDN'T LAST AND YOU DIDN'T HAVE MONEY TO GET MORE.: NEVER TRUE

## 2024-04-11 ASSESSMENT — PATIENT HEALTH QUESTIONNAIRE - PHQ9
5. POOR APPETITE OR OVEREATING: NOT AT ALL
4. FEELING TIRED OR HAVING LITTLE ENERGY: NOT AT ALL
SUM OF ALL RESPONSES TO PHQ QUESTIONS 1-9: 0
9. THOUGHTS THAT YOU WOULD BE BETTER OFF DEAD, OR OF HURTING YOURSELF: NOT AT ALL
SUM OF ALL RESPONSES TO PHQ QUESTIONS 1-9: 0
6. FEELING BAD ABOUT YOURSELF - OR THAT YOU ARE A FAILURE OR HAVE LET YOURSELF OR YOUR FAMILY DOWN: NOT AT ALL
10. IF YOU CHECKED OFF ANY PROBLEMS, HOW DIFFICULT HAVE THESE PROBLEMS MADE IT FOR YOU TO DO YOUR WORK, TAKE CARE OF THINGS AT HOME, OR GET ALONG WITH OTHER PEOPLE: NOT DIFFICULT AT ALL
SUM OF ALL RESPONSES TO PHQ9 QUESTIONS 1 & 2: 0
SUM OF ALL RESPONSES TO PHQ QUESTIONS 1-9: 0
2. FEELING DOWN, DEPRESSED OR HOPELESS: NOT AT ALL
SUM OF ALL RESPONSES TO PHQ QUESTIONS 1-9: 0
3. TROUBLE FALLING OR STAYING ASLEEP: NOT AT ALL
7. TROUBLE CONCENTRATING ON THINGS, SUCH AS READING THE NEWSPAPER OR WATCHING TELEVISION: NOT AT ALL
8. MOVING OR SPEAKING SO SLOWLY THAT OTHER PEOPLE COULD HAVE NOTICED. OR THE OPPOSITE, BEING SO FIGETY OR RESTLESS THAT YOU HAVE BEEN MOVING AROUND A LOT MORE THAN USUAL: NOT AT ALL
1. LITTLE INTEREST OR PLEASURE IN DOING THINGS: NOT AT ALL

## 2024-04-11 NOTE — PROGRESS NOTES
\"Have you been to the ER, urgent care clinic since your last visit?  Hospitalized since your last visit?\"    NO    “Have you seen or consulted any other health care providers outside of [facility] since your last visit?”    NO        “Have you had a colorectal cancer screening such as a colonoscopy/FIT/Cologuard?    NO    Date of last Colonoscopy: 10/5/2018  No cologuard on file  No FIT/FOBT on file   No flexible sigmoidoscopy on file         Click Here for Release of Records Request    
  microalbumin:  foot exam:  eye exam:  dentist:  BECKV:       Active Ambulatory Problems     Diagnosis Date Noted    Uterine fibroid 02/21/2008    Fatty liver     Family history of diabetes mellitus (DM) 01/15/2016    Bilateral shoulder pain 01/01/2019    Obesity, Class I, BMI 30-34.9 07/12/2012    Eczema 01/01/2016    Hypothyroidism due to acquired atrophy of thyroid 01/15/2016    Dyslipidemia 01/15/2016    History of total hip replacement 02/21/2017    Hyperglycemia 08/22/2017    Diverticulosis     Severe obesity (HCC) 07/31/2019    Family history of colon cancer 01/15/2016    Family history of dementia 01/15/2016    Family history of blood clots 01/15/2016    Family history of carcinoma in situ of anal canal 01/15/2016    Family history of osteoporosis in mother 01/15/2016    Major depression in complete remission (HCC) 01/15/2016    Colon polyp     Granulomatous lung disease (HCC) 01/01/2017    Family history of osteoporosis 01/15/2016    Ovarian cyst 02/21/2008    Vitamin D deficiency 01/15/2016    Family history of stroke 01/15/2016    Adjustment disorder 08/01/2012     Resolved Ambulatory Problems     Diagnosis Date Noted    Chronic renal disease, stage III (HCC) 05/12/2022     Past Medical History:   Diagnosis Date    Allergic rhinitis 2016    Asthma 1980    Depression     DJD (degenerative joint disease) of hip 09/19/2012    Hearing aid worn 09/2014    History of bacterial pneumonia 1981    History of chickenpox childhood    History of colon polyps     History of common wart 05/2015    History of echocardiogram 12/2020    History of hematuria 04/1988    History of mumps childhood    History of ovarian cyst 02/21/2008    History of receipt of hepatitis B immune globulin 03/2014    History of scarlet fever 1971    History of sebaceous cyst 05/2015    Hypercholesterolemia 1976    Hypothyroidism (acquired) 10/16/2006    Incidental pulmonary nodule 2019    Measles childhood    NAFL (nonalcoholic fatty liver)

## 2024-04-12 NOTE — RESULT ENCOUNTER NOTE
Your cholesterol is a little elevated still, continue to work on diet and exercise and we can discuss further at your next visit. Electrolytes, kidney function, liver labs all look ok. Blood counts and thyroid labs are normal.

## 2024-04-16 RX ORDER — PRAVASTATIN SODIUM 80 MG/1
TABLET ORAL
Qty: 90 TABLET | Refills: 3 | Status: SHIPPED | OUTPATIENT
Start: 2024-04-16

## 2024-04-16 RX ORDER — LEVOTHYROXINE SODIUM 0.1 MG/1
TABLET ORAL
Qty: 90 TABLET | Refills: 3 | Status: SHIPPED | OUTPATIENT
Start: 2024-04-16

## 2024-04-16 RX ORDER — BUSPIRONE HYDROCHLORIDE 5 MG/1
TABLET ORAL
Qty: 270 TABLET | Refills: 1 | Status: SHIPPED | OUTPATIENT
Start: 2024-04-16

## 2024-04-16 NOTE — TELEPHONE ENCOUNTER
PCP: Osmany Norris MD    Last appt: Visit date not found    No future appointments.    Requested Prescriptions     Pending Prescriptions Disp Refills    sertraline (ZOLOFT) 50 MG tablet [Pharmacy Med Name: SERTRALINE HCL 50 MG TABLET] 45 tablet 1     Sig: TAKE 1/2 TABLET BY MOUTH DAILY

## 2024-05-10 ENCOUNTER — TELEPHONE (OUTPATIENT)
Age: 69
End: 2024-05-10

## 2024-05-10 DIAGNOSIS — T75.3XXA SEA SICKNESS, INITIAL ENCOUNTER: Primary | ICD-10-CM

## 2024-05-14 DIAGNOSIS — I10 PRIMARY HYPERTENSION: Primary | ICD-10-CM

## 2024-05-14 RX ORDER — AMLODIPINE BESYLATE 2.5 MG/1
2.5 TABLET ORAL DAILY
Qty: 90 TABLET | Refills: 3 | Status: SHIPPED | OUTPATIENT
Start: 2024-05-14

## 2024-05-14 NOTE — TELEPHONE ENCOUNTER
PCP: Osmany Norris MD    Last appt: 4/11/2024  No future appointments.    Requested Prescriptions     Pending Prescriptions Disp Refills    amLODIPine (NORVASC) 2.5 MG tablet 90 tablet 1     Sig: Take 1 tablet by mouth daily

## 2024-05-14 NOTE — TELEPHONE ENCOUNTER
amLODIPine (NORVASC) 2.5 MG tablet      [facility]/pharmacy #[id number] [place] [id number] [facility] - P [phone] - f [phone]

## 2024-05-23 RX ORDER — VALSARTAN 80 MG/1
80 TABLET ORAL DAILY
Qty: 90 TABLET | Refills: 3 | Status: SHIPPED | OUTPATIENT
Start: 2024-05-23

## 2024-05-23 NOTE — TELEPHONE ENCOUNTER
PCP: Osmany Norris MD    Last appt: Visit date not found    No future appointments.    Requested Prescriptions     Pending Prescriptions Disp Refills    valsartan (DIOVAN) 80 MG tablet [Pharmacy Med Name: VALSARTAN 80 MG TABLET] 90 tablet 1     Sig: TAKE 1 TABLET BY MOUTH EVERY DAY

## 2024-05-30 NOTE — TELEPHONE ENCOUNTER
Patient states she is calling to get an update on request for Motion Sickness Patches prior to her Cruise. Patient states it's been Several years since been on a Cruise but has used patches before because she does get motion sickness on a Boat [employer] Plane. Please call to discuss. Thank you      Patient leaves on Cruise Tuesday, 6/4/24 so needs Approval Asap & No Later please than Monday, 6/3/24

## 2024-05-31 RX ORDER — SCOLOPAMINE TRANSDERMAL SYSTEM 1 MG/1
1 PATCH, EXTENDED RELEASE TRANSDERMAL
Qty: 3 PATCH | Refills: 1 | Status: SHIPPED | OUTPATIENT
Start: 2024-05-31

## 2024-05-31 NOTE — TELEPHONE ENCOUNTER
1. Sea sickness, initial encounter  -     scopolamine (TRANSDERM-SCOP) transdermal patch; Place 1 patch onto the skin every 72 hours Apply 1 patch behind ear at least 4 hours prior to required effect for use up to 72 hours, Disp-3 patch, R-1Normal

## 2024-06-17 ENCOUNTER — TRANSCRIBE ORDERS (OUTPATIENT)
Facility: HOSPITAL | Age: 69
End: 2024-06-17

## 2024-06-17 ENCOUNTER — HOSPITAL ENCOUNTER (OUTPATIENT)
Facility: HOSPITAL | Age: 69
Discharge: HOME | End: 2024-06-20
Payer: MEDICARE

## 2024-06-17 DIAGNOSIS — R05.9 COUGH, UNSPECIFIED TYPE: ICD-10-CM

## 2024-06-17 DIAGNOSIS — R05.9 COUGH, UNSPECIFIED TYPE: Primary | ICD-10-CM

## 2024-06-17 PROCEDURE — 71046 X-RAY EXAM CHEST 2 VIEWS: CPT

## 2024-08-05 RX ORDER — BUSPIRONE HYDROCHLORIDE 5 MG/1
TABLET ORAL
Qty: 270 TABLET | Refills: 1 | Status: SHIPPED | OUTPATIENT
Start: 2024-08-05

## 2024-08-27 ENCOUNTER — OFFICE VISIT (OUTPATIENT)
Age: 69
End: 2024-08-27
Payer: MEDICARE

## 2024-08-27 VITALS
DIASTOLIC BLOOD PRESSURE: 72 MMHG | SYSTOLIC BLOOD PRESSURE: 137 MMHG | RESPIRATION RATE: 16 BRPM | TEMPERATURE: 98.1 F | OXYGEN SATURATION: 97 % | BODY MASS INDEX: 33.46 KG/M2 | WEIGHT: 196 LBS | HEIGHT: 64 IN | HEART RATE: 76 BPM

## 2024-08-27 DIAGNOSIS — I10 PRIMARY HYPERTENSION: Primary | ICD-10-CM

## 2024-08-27 DIAGNOSIS — E78.5 DYSLIPIDEMIA: ICD-10-CM

## 2024-08-27 DIAGNOSIS — Z12.11 ENCOUNTER FOR SCREENING FOR MALIGNANT NEOPLASM OF COLON: ICD-10-CM

## 2024-08-27 DIAGNOSIS — E03.4 HYPOTHYROIDISM DUE TO ACQUIRED ATROPHY OF THYROID: ICD-10-CM

## 2024-08-27 PROCEDURE — 3017F COLORECTAL CA SCREEN DOC REV: CPT | Performed by: STUDENT IN AN ORGANIZED HEALTH CARE EDUCATION/TRAINING PROGRAM

## 2024-08-27 PROCEDURE — 1036F TOBACCO NON-USER: CPT | Performed by: STUDENT IN AN ORGANIZED HEALTH CARE EDUCATION/TRAINING PROGRAM

## 2024-08-27 PROCEDURE — 3075F SYST BP GE 130 - 139MM HG: CPT | Performed by: STUDENT IN AN ORGANIZED HEALTH CARE EDUCATION/TRAINING PROGRAM

## 2024-08-27 PROCEDURE — 99214 OFFICE O/P EST MOD 30 MIN: CPT | Performed by: STUDENT IN AN ORGANIZED HEALTH CARE EDUCATION/TRAINING PROGRAM

## 2024-08-27 PROCEDURE — 3078F DIAST BP <80 MM HG: CPT | Performed by: STUDENT IN AN ORGANIZED HEALTH CARE EDUCATION/TRAINING PROGRAM

## 2024-08-27 PROCEDURE — G8417 CALC BMI ABV UP PARAM F/U: HCPCS | Performed by: STUDENT IN AN ORGANIZED HEALTH CARE EDUCATION/TRAINING PROGRAM

## 2024-08-27 PROCEDURE — G8399 PT W/DXA RESULTS DOCUMENT: HCPCS | Performed by: STUDENT IN AN ORGANIZED HEALTH CARE EDUCATION/TRAINING PROGRAM

## 2024-08-27 PROCEDURE — G8427 DOCREV CUR MEDS BY ELIG CLIN: HCPCS | Performed by: STUDENT IN AN ORGANIZED HEALTH CARE EDUCATION/TRAINING PROGRAM

## 2024-08-27 PROCEDURE — 1123F ACP DISCUSS/DSCN MKR DOCD: CPT | Performed by: STUDENT IN AN ORGANIZED HEALTH CARE EDUCATION/TRAINING PROGRAM

## 2024-08-27 PROCEDURE — 1090F PRES/ABSN URINE INCON ASSESS: CPT | Performed by: STUDENT IN AN ORGANIZED HEALTH CARE EDUCATION/TRAINING PROGRAM

## 2024-08-27 RX ORDER — ROSUVASTATIN CALCIUM 10 MG/1
10 TABLET, COATED ORAL DAILY
Qty: 90 TABLET | Refills: 1 | Status: SHIPPED | OUTPATIENT
Start: 2024-08-27

## 2024-08-27 ASSESSMENT — PATIENT HEALTH QUESTIONNAIRE - PHQ9
SUM OF ALL RESPONSES TO PHQ QUESTIONS 1-9: 0
2. FEELING DOWN, DEPRESSED OR HOPELESS: NOT AT ALL
SUM OF ALL RESPONSES TO PHQ QUESTIONS 1-9: 0
SUM OF ALL RESPONSES TO PHQ9 QUESTIONS 1 & 2: 0
SUM OF ALL RESPONSES TO PHQ QUESTIONS 1-9: 0
SUM OF ALL RESPONSES TO PHQ QUESTIONS 1-9: 0
1. LITTLE INTEREST OR PLEASURE IN DOING THINGS: NOT AT ALL

## 2024-08-27 NOTE — ASSESSMENT & PLAN NOTE
Uncontrolled, will attempt to escalate to rosuvastatin, did not tolerate atorvastatin previously.

## 2024-08-27 NOTE — PROGRESS NOTES
HISTORY OF PRESENT ILLNESS   Denisha Herrera is a 69 y.o. female who  has a past medical history of Adjustment disorder, Allergic rhinitis, Asthma, Bilateral shoulder pain, Depression, Diverticulosis, DJD (degenerative joint disease) of hip, Eczema, Granulomatous lung disease (HCC), Hearing aid worn, History of bacterial pneumonia, History of chickenpox, History of colon polyps, History of common wart, History of echocardiogram, History of hematuria, History of mumps, History of ovarian cyst, History of receipt of hepatitis B immune globulin, History of scarlet fever, History of sebaceous cyst, Hypercholesterolemia, Hypothyroidism (acquired), Incidental pulmonary nodule, Measles, NAFL (nonalcoholic fatty liver), Osteoarthritis, multiple sites, Osteopenia, PONV (postoperative nausea and vomiting), Postmenopausal, Uterine fibroid, and Vitamin D deficiency.     Pt presents for follow up for hypertension and depression.     She has been eating healthier and lost 25 lb since 2022.     Hypothyroidism: on levothyroxine, TSH was 0.754 in 11/21/22.   Patient denies fatigue, heat/cold intolerance, weight gain, constipation/diarrhea, depression/axiety, muscle pain,     HLD: on pravastatin 80 mg.  11/21/22.   4/11/24 ldl 137      HTN: valsartan 80 mg, amlodipine 2.5   Checking 2x/d. Typically running similarly at home, never over 135/87. Lowest 108/70.  Patient denies chest pain/chest pressure, worsening shortness of breath, shortness of breath with lying flat, LE swelling, palpitations, lightheadedness, dizziness, passing out, headaches.     Anxiety and depression: improved since switching jobs. Sertraline 50 mg (0.5 tab) and buspar 5 mg bid. Tried dc singulair but was unable due to worsening allergies.   8/27/24 symptoms well controlled with this regimen.     Pulmonary nodule  Asthma, allergies, urticaria: Going to Dr. Connor. On xolair, working well. Zyrtec, singulair, and benadryl at night, allegra in the morning.  rosuvastatin (CRESTOR) 10 MG tablet; Take 1 tablet by mouth daily, Disp-90 tablet, R-1Normal  3. Hypothyroidism due to acquired atrophy of thyroid  Assessment & Plan:   Well-controlled, continue current medications  4. Encounter for screening for malignant neoplasm of colon  -     AFL - Steve Rock MD, Gastroenterology, [place]         Follow up:  Return in about 2 months (around 10/27/2024) for MAWV.    Osmany Norris MD

## 2024-08-27 NOTE — PROGRESS NOTES
\"Have you been to the ER, urgent care clinic since your last visit?  Hospitalized since your last visit?\"    NO    “Have you seen or consulted any other health care providers outside of [facility] since your last visit?”    [facility]// asthma and hives   xolair        “Have you had a colorectal cancer screening such as a colonoscopy/FIT/Cologuard?    Unknown pt states due  Dr. Carranza    Date of last Colonoscopy: 10/5/2018  No cologuard on file  No FIT/FOBT on file   No flexible sigmoidoscopy on file         Click Here for Release of Records Request

## 2024-08-27 NOTE — PATIENT INSTRUCTIONS
Stop taking pravastatin and start taking rosuvastatin.    Make an appointment with the gastroenterologist.   
5

## 2024-12-21 DIAGNOSIS — E78.5 DYSLIPIDEMIA: ICD-10-CM

## 2024-12-23 RX ORDER — ROSUVASTATIN CALCIUM 10 MG/1
10 TABLET, COATED ORAL DAILY
Qty: 90 TABLET | Refills: 3 | Status: SHIPPED | OUTPATIENT
Start: 2024-12-23

## 2025-02-12 ENCOUNTER — ANESTHESIA EVENT (OUTPATIENT)
Facility: HOSPITAL | Age: 70
End: 2025-02-12
Payer: MEDICARE

## 2025-02-12 NOTE — ANESTHESIA PRE PROCEDURE
Evaluation  Patient summary reviewed and Nursing notes reviewed   history of anesthetic complications: PONV.  Airway: Mallampati: I     Neck ROM: full  Mouth opening: > = 3 FB   Dental:      Comment: Multiple chipped teeth    Pulmonary:normal exam    (+)           asthma:                           ROS comment: Granulomatous lung disease   Cardiovascular:  Exercise tolerance: good (>4 METS)  (+) hypertension:, hyperlipidemia      ECG reviewed      Echocardiogram reviewed               ROS comment:   ECG (4/25/22):  Normal sinus rhythm   Normal ECG   When compared with ECG of 26-JAN-2016 18:09,   No significant change was found     TTE (12/7/20):  · LV: Estimated LVEF is >70%. Biplane method used to measure ejection fraction. Normal cavity size and systolic function (ejection fraction normal). Mild concentric hypertrophy. Wall motion: normal. Mild (grade 1) left ventricular diastolic dysfunction.  · LA: Mildly dilated left atrium.  · RV: Not well visualized.  · AV: Probably trileaflet aortic valve.  · MV: Mild mitral valve regurgitation is present.       Neuro/Psych:   (+) psychiatric history:depression/anxiety             GI/Hepatic/Renal:   (+) liver disease (NAFL (nonalcoholic fatty liver)):         ROS comment: history of colon cancer   Diverticulosis  Obesity  Colon polyp   Fatty liver    .   Endo/Other:    (+) hypothyroidism::..                  ROS comment: Uterine fibroid   Bilateral shoulder pain   Obesity, Class I, BMI 30-34.9   Eczema   Hypothyroidism due to acquired atrophy of thyroid   Dyslipidemia   History of total hip replacement   Hyperglycemia   Ovarian cyst   Vitamin D deficiency        Abdominal:             Vascular:          Other Findings:       Anesthesia Plan      TIVA     ASA 3       Induction: intravenous.  continuous noninvasive hemodynamic monitor    Anesthetic plan and risks discussed with patient.      Plan discussed with CRNA.                Naren Nunez MD   2/12/2025

## 2025-02-13 RX ORDER — FLUTICASONE PROPIONATE AND SALMETEROL 500; 50 UG/1; UG/1
1 POWDER RESPIRATORY (INHALATION) EVERY 12 HOURS
COMMUNITY

## 2025-02-14 ENCOUNTER — ANESTHESIA (OUTPATIENT)
Facility: HOSPITAL | Age: 70
End: 2025-02-14
Payer: MEDICARE

## 2025-02-14 ENCOUNTER — HOSPITAL ENCOUNTER (OUTPATIENT)
Facility: HOSPITAL | Age: 70
Setting detail: OUTPATIENT SURGERY
Discharge: HOME | End: 2025-02-14
Attending: INTERNAL MEDICINE | Admitting: INTERNAL MEDICINE
Payer: MEDICARE

## 2025-02-14 VITALS
RESPIRATION RATE: 15 BRPM | HEART RATE: 76 BPM | TEMPERATURE: 97.8 F | SYSTOLIC BLOOD PRESSURE: 121 MMHG | WEIGHT: 197 LBS | OXYGEN SATURATION: 96 % | DIASTOLIC BLOOD PRESSURE: 68 MMHG | HEIGHT: 64 IN | BODY MASS INDEX: 33.63 KG/M2

## 2025-02-14 PROCEDURE — 7100000011 HC PHASE II RECOVERY - ADDTL 15 MIN: Performed by: INTERNAL MEDICINE

## 2025-02-14 PROCEDURE — 3700000000 HC ANESTHESIA ATTENDED CARE: Performed by: INTERNAL MEDICINE

## 2025-02-14 PROCEDURE — 2580000003 HC RX 258: Performed by: INTERNAL MEDICINE

## 2025-02-14 PROCEDURE — 3600007502: Performed by: INTERNAL MEDICINE

## 2025-02-14 PROCEDURE — 3700000001 HC ADD 15 MINUTES (ANESTHESIA): Performed by: INTERNAL MEDICINE

## 2025-02-14 PROCEDURE — 7100000010 HC PHASE II RECOVERY - FIRST 15 MIN: Performed by: INTERNAL MEDICINE

## 2025-02-14 PROCEDURE — 6360000002 HC RX W HCPCS: Performed by: NURSE ANESTHETIST, CERTIFIED REGISTERED

## 2025-02-14 PROCEDURE — 3600007512: Performed by: INTERNAL MEDICINE

## 2025-02-14 PROCEDURE — 2709999900 HC NON-CHARGEABLE SUPPLY: Performed by: INTERNAL MEDICINE

## 2025-02-14 RX ORDER — SODIUM CHLORIDE 0.9 % (FLUSH) 0.9 %
5-40 SYRINGE (ML) INJECTION PRN
Status: DISCONTINUED | OUTPATIENT
Start: 2025-02-14 | End: 2025-02-14 | Stop reason: HOSPADM

## 2025-02-14 RX ORDER — SODIUM CHLORIDE 0.9 % (FLUSH) 0.9 %
5-40 SYRINGE (ML) INJECTION EVERY 12 HOURS SCHEDULED
Status: DISCONTINUED | OUTPATIENT
Start: 2025-02-14 | End: 2025-02-14 | Stop reason: HOSPADM

## 2025-02-14 RX ORDER — SODIUM CHLORIDE 9 MG/ML
INJECTION, SOLUTION INTRAVENOUS PRN
Status: DISCONTINUED | OUTPATIENT
Start: 2025-02-14 | End: 2025-02-14 | Stop reason: HOSPADM

## 2025-02-14 RX ADMIN — PROPOFOL 100 MG: 10 INJECTION, EMULSION INTRAVENOUS at 09:20

## 2025-02-14 RX ADMIN — PROPOFOL 100 MG: 10 INJECTION, EMULSION INTRAVENOUS at 09:31

## 2025-02-14 RX ADMIN — SODIUM CHLORIDE: 9 INJECTION, SOLUTION INTRAVENOUS at 09:16

## 2025-02-14 RX ADMIN — PROPOFOL 100 MG: 10 INJECTION, EMULSION INTRAVENOUS at 09:25

## 2025-02-14 RX ADMIN — LIDOCAINE HYDROCHLORIDE 100 MG: 20 INJECTION, SOLUTION EPIDURAL; INFILTRATION; INTRACAUDAL; PERINEURAL at 09:20

## 2025-02-14 ASSESSMENT — PAIN - FUNCTIONAL ASSESSMENT: PAIN_FUNCTIONAL_ASSESSMENT: NONE - DENIES PAIN

## 2025-02-14 NOTE — PROGRESS NOTES
Endoscope was pre-cleaned at bedside immediately following procedure by LAURA Don.    Glasses and bilateral hearing aids returned.

## 2025-02-14 NOTE — DISCHARGE INSTRUCTIONS
Denisha Herrera  [id number]  1955    COLON DISCHARGE INSTRUCTIONS  Discomfort:  Redness at IV site- apply warm compress to area; if redness or soreness persist- contact your physician  There may be a slight amount of blood passed from the rectum  Gaseous discomfort- walking, belching will help relieve any discomfort  You may not operate a vehicle for 12 hours  You may not engage in an occupation involving machinery or appliances for rest of today  You may not drink alcoholic beverages for at least 12 hours  Avoid making any critical decisions for at least 24 hour  DIET:   Regular diet.   - however -  remember your colon is empty and a heavy meal will produce gas.   Avoid these foods:  vegetables, fried / greasy foods, carbonated drinks for today  MEDICATION:  [unfilled]     ACTIVITY:  You may not resume your normal daily activities until tomorrow AM; it is recommended that you spend the remainder of the day resting -  avoid any strenuous activity.  CALL M.D.  ANY SIGN OF:   Increasing pain, nausea, vomiting  Abdominal distension (swelling)  New increased bleeding (oral or rectal)  Fever (chills)  Pain in chest area  Bloody discharge from nose or mouth  Shortness of breath    You may  take any Advil, Aspirin, Ibuprofen, Motrin, Aleve, or Goody’s for 10 days, ONLY  Tylenol as needed for pain.    IMPRESSION:  Impression:    Small lipoma in transverse colon  Moderate pan-diverticulosis  Medium sized internal hemorrhoids seen on retroflexion  Otherwise normal colonoscopy through to the cecum    Recommendations:   Repeat colonoscopy in 5 years for high risk screening/surveillance    Follow-up Instructions:  Telephone # [phone]      Steve Rock MD    Patient Education on Sedation / Analgesia Administered for Procedure      For 24 hours after general anesthesia or intravenous analgesia / sedation:  Have someone responsible help you with your care  Limit your activities  Do not drive and operate hazardous

## 2025-02-14 NOTE — PROGRESS NOTES
ARRIVAL INFORMATION:  Verified patient name and date of birth, scheduled procedure, and informed consent.     : Judy lowe contact number: [phone]  Physician and staff can share information with the .     Receive texts: yes    Belongings with patient include:  Clothing,Glasses, Hearing Aides bilateral    GI FOCUSED ASSESSMENT:  Neuro: Awake, alert, oriented x4  Respiratory: even and unlabored   GI: soft and non-distended  EKG Rhythm: normal sinus rhythm    Education:Reviewed general discharge instructions and  information.

## 2025-02-14 NOTE — ANESTHESIA POSTPROCEDURE EVALUATION
Department of Anesthesiology  Postprocedure Note    Patient: Denisha Herrera  MRN: [id number]  YOB: 1955  Date of evaluation: 2/14/2025    Procedure Summary       Date: 02/14/25 Room / Location: [facility] ENDO 03 / [facility] ENDOSCOPY    Anesthesia Start: 0916 Anesthesia Stop: 0937    Procedure: COLONOSCOPY Diagnosis:       Family history of colon cancer      Personal history of colon cancer      (Family history of colon cancer [Z80.0])      (Personal history of colon cancer [Z85.038])    Surgeons: Steve Rock MD Responsible Provider: Rosalino Back MD    Anesthesia Type: MAC, TIVA ASA Status: 3            Anesthesia Type: MAC, TIVA    Amy Phase I: Amy Score: 10    Amy Phase II:      Anesthesia Post Evaluation    Patient location during evaluation: PACU  Patient participation: complete - patient participated  Level of consciousness: awake  Airway patency: patent  Nausea & Vomiting: no vomiting  Cardiovascular status: hemodynamically stable  Respiratory status: acceptable  Hydration status: euvolemic    No notable events documented.

## 2025-02-14 NOTE — OP NOTE
NAME:  Denihsa Herrera   :   1955   MRN:   [id number]     Date/Time:  2025 9:36 AM    Colonoscopy Operative Report    Procedure Type:   Colonoscopy --screening     Indications:     Family history of coloretal cancer (screening only), Personal history of colon polyps (screening only)  Pre-operative Diagnosis: see indication above  Post-operative Diagnosis:  See findings below  :  Steve Rock MD  Referring Provider: --Osmany Norrsi MD    Exam:  Airway: clear, no airway problems anticipated  Heart: RRR, without gallops or rubs  Lungs: clear bilaterally without wheezes, crackles, or rhonchi  Abdomen: soft, nontender, nondistended, bowel sounds present  Mental Status: awake, alert and oriented to person, place and time    Sedation:  MAC anesthesia Propofol  Procedure Details:  After informed consent was obtained with all risks and benefits of procedure explained and preoperative exam completed, the patient was taken to the endoscopy suite and placed in the left lateral decubitus position.  Upon sequential sedation as per above, a digital rectal exam was performed demonstrating internal hemorrhoids.  The Olympus videocolonoscope  was inserted in the rectum and carefully advanced to the cecum, which was identified by the ileocecal valve and appendiceal orifice.   The quality of preparation was fair.  The colonoscope was slowly withdrawn with careful evaluation between folds. Retroflexion in the rectum was completed demonstrating internal hemorrhoids.     Findings:   Small lipoma in transverse colon  Moderate pan-diverticulosis  Medium sized internal hemorrhoids seen on retroflexion  Otherwise normal colonoscopy through to the cecum    Specimen Removed:  None  Complications: None.   EBL:  None.    Impression:    Small lipoma in transverse colon  Moderate pan-diverticulosis  Medium sized internal hemorrhoids seen on retroflexion  Otherwise normal colonoscopy through to the

## 2025-02-14 NOTE — H&P
Gastroenterology Outpatient History and Physical    Patient: Denishabev Herrera    Physician: Steve Rock MD    Chief Complaint: H/o colon polyps  History of Present Illness: No GI complaints    History:  Past Medical History:   Diagnosis Date    Adjustment disorder 08/2012    Dr. Belen [place].  Lilian Chatterjee, counselor    Allergic rhinitis 2016    Dr. Be    Asthma 1980    Dr. iLvan Be, pulm.  [facility] maxi inhalation.    Bilateral shoulder pain 2019    R partial RCT, OA GH and AC joint.  Dr. Quentin Beach    Depression     Diverticulosis 10/2018    pandiverticulosis.  Dr. Tahir Celaya    DJD (degenerative joint disease) of hip 09/19/2012    Dr. Luis Miguel Willard    Eczema 2016    Dr. Tahir Guzmán    Granulomatous lung disease (HCC) 2017    stable.  Dr. Be.    Hearing aid worn 09/2014    Bilateral ears.      History of bacterial pneumonia 1981    x 2    History of chickenpox childhood    History of colon polyps     Dr. Celaya    History of common wart 05/2015    Cutaneous wart - Right thumb.  [place].  Dr. Perez.    History of echocardiogram 12/2020    TTE 12/20 - LV: Estimated LVEF is >70%. Biplane method used to measure ejection fraction. Normal cavity size and systolic function (ejection fraction normal). Mild concentric hypertrophy. Wall motion: normal. Mild (grade 1) left ventricular diastolic dysfunction. Mild MR.    History of hematuria 04/1988    History of mumps childhood    History of ovarian cyst 02/21/2008    Left and Rt .  Mrs. Kathryn Quevedo    History of receipt of hepatitis B immune globulin 03/2014    Prior vaccine--[id number].    History of scarlet fever 1971    History of sebaceous cyst 05/2015    Digital mucous cyst Left IP joint.  Dr. Mor Perez.    Hypercholesterolemia 1976    Hypothyroidism (acquired) 10/16/2006    Incidental pulmonary nodule 2019    RUL.  Dr. Livan Be.    Measles childhood    NAFL (nonalcoholic fatty liver) 2006    Osteoarthritis, multiple

## 2025-04-14 RX ORDER — BUSPIRONE HYDROCHLORIDE 5 MG/1
TABLET ORAL
Qty: 270 TABLET | Refills: 1 | Status: SHIPPED | OUTPATIENT
Start: 2025-04-14

## 2025-05-04 DIAGNOSIS — I10 PRIMARY HYPERTENSION: ICD-10-CM

## 2025-05-05 RX ORDER — AMLODIPINE BESYLATE 2.5 MG/1
2.5 TABLET ORAL DAILY
Qty: 30 TABLET | Refills: 0 | Status: SHIPPED | OUTPATIENT
Start: 2025-05-05

## 2025-05-05 RX ORDER — PRAVASTATIN SODIUM 80 MG/1
80 TABLET ORAL DAILY
Qty: 90 TABLET | Refills: 3 | OUTPATIENT
Start: 2025-05-05

## 2025-05-06 NOTE — TELEPHONE ENCOUNTER
PCP: Osmany Norris MD    Last appt: 8/27/2024  Future Appointments   Date Time Provider Department Center   6/27/2025  8:45 AM Osmany Norris MD [facility] DEP       Requested Prescriptions     Pending Prescriptions Disp Refills    sertraline (ZOLOFT) 50 MG tablet [Pharmacy Med Name: SERTRALINE HCL 50 MG TABLET] 45 tablet 4     Sig: TAKE 1/2 TABLET BY MOUTH DAILY

## 2025-05-15 RX ORDER — VALSARTAN 80 MG/1
80 TABLET ORAL DAILY
Qty: 90 TABLET | Refills: 1 | Status: SHIPPED | OUTPATIENT
Start: 2025-05-15

## 2025-05-15 RX ORDER — LEVOTHYROXINE SODIUM 100 UG/1
100 TABLET ORAL
Qty: 90 TABLET | Refills: 3 | Status: SHIPPED | OUTPATIENT
Start: 2025-05-15

## 2025-05-31 DIAGNOSIS — I10 PRIMARY HYPERTENSION: ICD-10-CM

## 2025-06-02 RX ORDER — AMLODIPINE BESYLATE 2.5 MG/1
2.5 TABLET ORAL DAILY
Qty: 90 TABLET | Refills: 1 | Status: SHIPPED | OUTPATIENT
Start: 2025-06-02

## 2025-06-27 ENCOUNTER — OFFICE VISIT (OUTPATIENT)
Age: 70
End: 2025-06-27
Payer: MEDICARE

## 2025-06-27 VITALS
WEIGHT: 198.8 LBS | BODY MASS INDEX: 33.94 KG/M2 | TEMPERATURE: 97.5 F | HEART RATE: 60 BPM | HEIGHT: 64 IN | DIASTOLIC BLOOD PRESSURE: 67 MMHG | SYSTOLIC BLOOD PRESSURE: 118 MMHG | OXYGEN SATURATION: 99 % | RESPIRATION RATE: 14 BRPM

## 2025-06-27 DIAGNOSIS — E03.4 HYPOTHYROIDISM DUE TO ACQUIRED ATROPHY OF THYROID: ICD-10-CM

## 2025-06-27 DIAGNOSIS — E55.9 VITAMIN D DEFICIENCY: ICD-10-CM

## 2025-06-27 DIAGNOSIS — E78.5 DYSLIPIDEMIA: ICD-10-CM

## 2025-06-27 DIAGNOSIS — J84.10 GRANULOMATOUS LUNG DISEASE (HCC): ICD-10-CM

## 2025-06-27 DIAGNOSIS — Z12.31 BREAST CANCER SCREENING BY MAMMOGRAM: ICD-10-CM

## 2025-06-27 DIAGNOSIS — I10 PRIMARY HYPERTENSION: ICD-10-CM

## 2025-06-27 DIAGNOSIS — I10 PRIMARY HYPERTENSION: Primary | ICD-10-CM

## 2025-06-27 PROCEDURE — 99214 OFFICE O/P EST MOD 30 MIN: CPT | Performed by: STUDENT IN AN ORGANIZED HEALTH CARE EDUCATION/TRAINING PROGRAM

## 2025-06-27 SDOH — ECONOMIC STABILITY: FOOD INSECURITY: WITHIN THE PAST 12 MONTHS, THE FOOD YOU BOUGHT JUST DIDN'T LAST AND YOU DIDN'T HAVE MONEY TO GET MORE.: NEVER TRUE

## 2025-06-27 SDOH — ECONOMIC STABILITY: FOOD INSECURITY: WITHIN THE PAST 12 MONTHS, YOU WORRIED THAT YOUR FOOD WOULD RUN OUT BEFORE YOU GOT MONEY TO BUY MORE.: NEVER TRUE

## 2025-06-27 ASSESSMENT — PATIENT HEALTH QUESTIONNAIRE - PHQ9
SUM OF ALL RESPONSES TO PHQ QUESTIONS 1-9: 0
4. FEELING TIRED OR HAVING LITTLE ENERGY: NOT AT ALL
SUM OF ALL RESPONSES TO PHQ QUESTIONS 1-9: 0
1. LITTLE INTEREST OR PLEASURE IN DOING THINGS: NOT AT ALL
2. FEELING DOWN, DEPRESSED OR HOPELESS: NOT AT ALL
5. POOR APPETITE OR OVEREATING: NOT AT ALL
6. FEELING BAD ABOUT YOURSELF - OR THAT YOU ARE A FAILURE OR HAVE LET YOURSELF OR YOUR FAMILY DOWN: NOT AT ALL
SUM OF ALL RESPONSES TO PHQ QUESTIONS 1-9: 0
10. IF YOU CHECKED OFF ANY PROBLEMS, HOW DIFFICULT HAVE THESE PROBLEMS MADE IT FOR YOU TO DO YOUR WORK, TAKE CARE OF THINGS AT HOME, OR GET ALONG WITH OTHER PEOPLE: NOT DIFFICULT AT ALL
7. TROUBLE CONCENTRATING ON THINGS, SUCH AS READING THE NEWSPAPER OR WATCHING TELEVISION: NOT AT ALL
SUM OF ALL RESPONSES TO PHQ QUESTIONS 1-9: 0
8. MOVING OR SPEAKING SO SLOWLY THAT OTHER PEOPLE COULD HAVE NOTICED. OR THE OPPOSITE, BEING SO FIGETY OR RESTLESS THAT YOU HAVE BEEN MOVING AROUND A LOT MORE THAN USUAL: NOT AT ALL
3. TROUBLE FALLING OR STAYING ASLEEP: NOT AT ALL
9. THOUGHTS THAT YOU WOULD BE BETTER OFF DEAD, OR OF HURTING YOURSELF: NOT AT ALL

## 2025-06-27 NOTE — PROGRESS NOTES
HISTORY OF PRESENT ILLNESS   Denisha Herrera is a 70 y.o. female who  has a past medical history of Adjustment disorder, Allergic rhinitis, Asthma, Bilateral shoulder pain, Depression, Diverticulosis, DJD (degenerative joint disease) of hip, Eczema, Granulomatous lung disease (HCC), Hearing aid worn, History of bacterial pneumonia, History of chickenpox, History of colon polyps, History of common wart, History of echocardiogram, History of hematuria, History of mumps, History of ovarian cyst, History of receipt of hepatitis B immune globulin, History of scarlet fever, History of sebaceous cyst, Hypercholesterolemia, Hypothyroidism (acquired), Incidental pulmonary nodule, Measles, NAFL (nonalcoholic fatty liver), Osteoarthritis, multiple sites, Osteopenia, PONV (postoperative nausea and vomiting), Postmenopausal, Uterine fibroid, and Vitamin D deficiency.     PNA: prevnar 20 in 2024 with Dr. Connor, need to update system.  RSV: advised to obtain from pharmacy before RSV season.  CSP due 2/14/2030    Pt presents for follow up for hypertension and depression.     Appt with dr. Connor on 3/18/25 for moderate persistent asthma w exacerbation. Xolair shot increased to every 4 weeks which is helping urticaria and asthma.     She has been eating healthier and lost 25 lb since 2022.     Hypothyroidism: on levothyroxine, TSH was 0.754 in 11/21/22.   Patient denies fatigue, heat/cold intolerance, weight gain, constipation/diarrhea, depression/axiety, muscle pain,     HLD: on pravastatin 80 mg.  11/21/22.   4/11/24 ldl 137      HTN: valsartan 80 mg, amlodipine 2.5   Checking 2x/d. Typically running 110-125    Patient denies chest pain/chest pressure, worsening shortness of breath, shortness of breath with lying flat, LE swelling, palpitations, lightheadedness, dizziness, passing out, headaches.     Anxiety and depression: improved since switching jobs. Sertraline 50 mg (0.5 tab) and buspar 5 mg bid. Tried dc

## 2025-06-29 ENCOUNTER — RESULTS FOLLOW-UP (OUTPATIENT)
Age: 70
End: 2025-06-29

## 2025-06-29 LAB
ALBUMIN SERPL-MCNC: 4.4 G/DL (ref 3.5–5)
ALBUMIN/GLOB SERPL: 1.6 (ref 1.1–2.2)
ALP SERPL-CCNC: 68 U/L (ref 45–117)
ALT SERPL-CCNC: 38 U/L (ref 12–78)
ANION GAP SERPL CALC-SCNC: 2 MMOL/L (ref 2–12)
AST SERPL-CCNC: 28 U/L (ref 15–37)
BILIRUB SERPL-MCNC: 0.6 MG/DL (ref 0.2–1)
BUN SERPL-MCNC: 16 MG/DL (ref 6–20)
BUN/CREAT SERPL: 15 (ref 12–20)
CALCIUM SERPL-MCNC: 9.4 MG/DL (ref 8.5–10.1)
CHLORIDE SERPL-SCNC: 108 MMOL/L (ref 97–108)
CHOLEST SERPL-MCNC: 155 MG/DL
CO2 SERPL-SCNC: 30 MMOL/L (ref 21–32)
CREAT SERPL-MCNC: 1.05 MG/DL (ref 0.55–1.02)
GLOBULIN SER CALC-MCNC: 2.7 G/DL (ref 2–4)
GLUCOSE SERPL-MCNC: 87 MG/DL (ref 65–100)
HDLC SERPL-MCNC: 61 MG/DL
HDLC SERPL: 2.5 (ref 0–5)
LDLC SERPL CALC-MCNC: 63.6 MG/DL (ref 0–100)
POTASSIUM SERPL-SCNC: 4.5 MMOL/L (ref 3.5–5.1)
PROT SERPL-MCNC: 7.1 G/DL (ref 6.4–8.2)
SODIUM SERPL-SCNC: 140 MMOL/L (ref 136–145)
T4 FREE SERPL-MCNC: 1 NG/DL (ref 0.8–1.5)
TRIGL SERPL-MCNC: 152 MG/DL
TSH SERPL DL<=0.05 MIU/L-ACNC: 1.52 UIU/ML (ref 0.36–3.74)
VLDLC SERPL CALC-MCNC: 30.4 MG/DL

## 2025-07-11 ENCOUNTER — HOSPITAL ENCOUNTER (OUTPATIENT)
Facility: HOSPITAL | Age: 70
Discharge: HOME | End: 2025-07-14
Attending: STUDENT IN AN ORGANIZED HEALTH CARE EDUCATION/TRAINING PROGRAM
Payer: MEDICARE

## 2025-07-11 DIAGNOSIS — Z12.31 BREAST CANCER SCREENING BY MAMMOGRAM: ICD-10-CM

## 2025-07-11 PROCEDURE — 77063 BREAST TOMOSYNTHESIS BI: CPT

## (undated) DEVICE — TIP SUCT TRNSPAR RIB SURF STD BLB RIG NVENT W/ 5IN1 CONN DYND50138] MEDLINE INDUSTRIES INC]

## (undated) DEVICE — BAG SPEC BIOHZD LF 2MIL 6X10IN -- CONVERT TO ITEM 357326

## (undated) DEVICE — IV START KIT: Brand: MEDLINE

## (undated) DEVICE — BW-412T DISP COMBO CLEANING BRUSH: Brand: SINGLE USE COMBINATION CLEANING BRUSH

## (undated) DEVICE — AIRLIFE™ U/CONNECT-IT OXYGEN TUBING 7 FEET (2.1 M) CRUSH-RESISTANT OXYGEN TUBING, VINYL TIPPED: Brand: AIRLIFE™

## (undated) DEVICE — CONNECTOR TBNG AUX H2O JET DISP FOR OLY 160/180 SER

## (undated) DEVICE — CUFF BLD PRSS AD CLTH SGL TB W/ BAYNT CONN ROUNDED CORNER

## (undated) DEVICE — TRAP SURG QUAD PARABOLA SLOT DSGN SFTY SCRN TRAPEASE

## (undated) DEVICE — SET GRAV CK VLV NEEDLESS ST 3 GANGED 4WAY STPCOCK HI FLO 10

## (undated) DEVICE — CATH IV AUTOGRD BC BLU 22GA 25 -- INSYTE

## (undated) DEVICE — SET ADMIN 16ML TBNG L100IN 2 Y INJ SITE IV PIGGY BK DISP

## (undated) DEVICE — SOLIDIFIER FLUID 3000 CC ABSORB

## (undated) DEVICE — CANN NASAL O2 CAPNOGRAPHY AD -- FILTERLINE

## (undated) DEVICE — BAG BELONG PT PERS CLEAR HANDL

## (undated) DEVICE — KIT IV STRT W CHLORAPREP PD 1ML

## (undated) DEVICE — CUFF BLD PRSS CHILD SM SZ 8 FOR 12-16CM LIMB VYN SFT W/O TB

## (undated) DEVICE — Z DISCONTINUED NO SUB IDED SET EXTN W/ 4 W STPCOCK M SPIN LOK 36IN

## (undated) DEVICE — ENDO CARRY-ON PROCEDURE KIT INCLUDES ENZYMATIC SPONGE, GAUZE, BIOHAZARD LABEL, TRAY, LUBRICANT, DIRTY SCOPE LABEL, WATER LABEL, TRAY, DRAWSTRING PAD, AND DEFENDO 4-PIECE KIT.: Brand: ENDO CARRY-ON PROCEDURE KIT

## (undated) DEVICE — REM POLYHESIVE ADULT PATIENT RETURN ELECTRODE: Brand: VALLEYLAB

## (undated) DEVICE — NEEDLE HYPO 18GA L1.5IN PNK S STL HUB POLYPR SHLD REG BVL

## (undated) DEVICE — 1200 GUARD II KIT W/5MM TUBE W/O VAC TUBE: Brand: GUARDIAN

## (undated) DEVICE — QUILTED PREMIUM COMFORT UNDERPAD,EXTRA HEAVY: Brand: WINGS

## (undated) DEVICE — KENDALL RADIOLUCENT FOAM MONITORING ELECTRODE -RECTANGULAR SHAPE: Brand: KENDALL

## (undated) DEVICE — CONTAINER SPEC 20 ML LID NEUT BUFF FORMALIN 10 % POLYPR STS

## (undated) DEVICE — ENDOSCOPIC KIT COMPLIANCE ENDOKIT

## (undated) DEVICE — Z DISCONTINUED USE 2751540 TUBING IRRIG L10IN DISP PMP ENDOGATOR

## (undated) DEVICE — SYRINGE MED 20ML STD CLR PLAS LUERLOCK TIP N CTRL DISP

## (undated) DEVICE — SNARE ENDOSCP M L240CM W27MM SHTH DIA2.4MM CHN 2.8MM OVL